# Patient Record
Sex: FEMALE | Race: WHITE
[De-identification: names, ages, dates, MRNs, and addresses within clinical notes are randomized per-mention and may not be internally consistent; named-entity substitution may affect disease eponyms.]

---

## 2017-01-19 ENCOUNTER — HOSPITAL ENCOUNTER (OUTPATIENT)
Dept: HOSPITAL 75 - PREOP | Age: 27
Discharge: HOME | End: 2017-01-19
Attending: OBSTETRICS & GYNECOLOGY
Payer: MEDICAID

## 2017-01-19 VITALS — WEIGHT: 196.13 LBS | BODY MASS INDEX: 33.48 KG/M2 | HEIGHT: 64 IN

## 2017-01-19 VITALS — SYSTOLIC BLOOD PRESSURE: 124 MMHG | DIASTOLIC BLOOD PRESSURE: 84 MMHG

## 2017-01-19 DIAGNOSIS — Z01.812: Primary | ICD-10-CM

## 2017-01-19 DIAGNOSIS — N80.9: ICD-10-CM

## 2017-01-19 LAB
BASOPHILS # BLD AUTO: 0 10^3/UL (ref 0–0.1)
BASOPHILS NFR BLD AUTO: 0 % (ref 0–10)
EOSINOPHIL # BLD AUTO: 0.1 10^3/UL (ref 0–0.3)
EOSINOPHIL NFR BLD AUTO: 2 % (ref 0–10)
ERYTHROCYTE [DISTWIDTH] IN BLOOD BY AUTOMATED COUNT: 13.1 % (ref 10–14.5)
LYMPHOCYTES # BLD AUTO: 2 X 10^3 (ref 1–4)
LYMPHOCYTES NFR BLD AUTO: 30 % (ref 12–44)
MCH RBC QN AUTO: 29 PG (ref 25–34)
MCHC RBC AUTO-ENTMCNC: 34 G/DL (ref 32–36)
MCV RBC AUTO: 84 FL (ref 80–99)
MONOCYTES # BLD AUTO: 0.4 X 10^3 (ref 0–1)
MONOCYTES NFR BLD AUTO: 6 % (ref 0–12)
NEUTROPHILS # BLD AUTO: 4.2 X 10^3 (ref 1.8–7.8)
NEUTROPHILS NFR BLD AUTO: 63 % (ref 42–75)
PLATELET # BLD: 340 10^3/UL (ref 130–400)
PMV BLD AUTO: 9.7 FL (ref 7.4–10.4)
RBC # BLD AUTO: 4.68 10^6/UL (ref 4.35–5.85)
WBC # BLD AUTO: 6.6 10^3/UL (ref 4.3–11)

## 2017-01-19 PROCEDURE — 87081 CULTURE SCREEN ONLY: CPT

## 2017-01-19 PROCEDURE — 85025 COMPLETE CBC W/AUTO DIFF WBC: CPT

## 2017-01-19 PROCEDURE — 36415 COLL VENOUS BLD VENIPUNCTURE: CPT

## 2017-01-19 PROCEDURE — 86901 BLOOD TYPING SEROLOGIC RH(D): CPT

## 2017-01-19 PROCEDURE — 86900 BLOOD TYPING SEROLOGIC ABO: CPT

## 2017-01-19 PROCEDURE — 86850 RBC ANTIBODY SCREEN: CPT

## 2017-01-26 ENCOUNTER — HOSPITAL ENCOUNTER (OUTPATIENT)
Dept: HOSPITAL 75 - SDC | Age: 27
LOS: 1 days | Discharge: HOME | End: 2017-01-27
Attending: OBSTETRICS & GYNECOLOGY
Payer: MEDICAID

## 2017-01-26 VITALS — DIASTOLIC BLOOD PRESSURE: 63 MMHG | SYSTOLIC BLOOD PRESSURE: 96 MMHG

## 2017-01-26 VITALS — DIASTOLIC BLOOD PRESSURE: 63 MMHG | SYSTOLIC BLOOD PRESSURE: 98 MMHG

## 2017-01-26 VITALS — SYSTOLIC BLOOD PRESSURE: 105 MMHG | DIASTOLIC BLOOD PRESSURE: 59 MMHG

## 2017-01-26 VITALS — DIASTOLIC BLOOD PRESSURE: 66 MMHG | SYSTOLIC BLOOD PRESSURE: 110 MMHG

## 2017-01-26 VITALS — DIASTOLIC BLOOD PRESSURE: 65 MMHG | SYSTOLIC BLOOD PRESSURE: 113 MMHG

## 2017-01-26 VITALS — BODY MASS INDEX: 33.48 KG/M2 | HEIGHT: 64 IN | WEIGHT: 196.13 LBS

## 2017-01-26 DIAGNOSIS — R33.9: ICD-10-CM

## 2017-01-26 DIAGNOSIS — Z87.42: ICD-10-CM

## 2017-01-26 DIAGNOSIS — R10.2: Primary | ICD-10-CM

## 2017-01-26 DIAGNOSIS — N94.5: ICD-10-CM

## 2017-01-26 DIAGNOSIS — N73.6: ICD-10-CM

## 2017-01-26 PROCEDURE — 96375 TX/PRO/DX INJ NEW DRUG ADDON: CPT

## 2017-01-26 PROCEDURE — 96376 TX/PRO/DX INJ SAME DRUG ADON: CPT

## 2017-01-26 PROCEDURE — 94664 DEMO&/EVAL PT USE INHALER: CPT

## 2017-01-26 PROCEDURE — 84703 CHORIONIC GONADOTROPIN ASSAY: CPT

## 2017-01-26 PROCEDURE — 96361 HYDRATE IV INFUSION ADD-ON: CPT

## 2017-01-26 RX ADMIN — MORPHINE SULFATE PRN MG: 10 INJECTION, SOLUTION INTRAMUSCULAR; INTRAVENOUS at 09:18

## 2017-01-26 RX ADMIN — KETOROLAC TROMETHAMINE PRN MG: 30 INJECTION, SOLUTION INTRAMUSCULAR; INTRAVENOUS at 09:17

## 2017-01-26 RX ADMIN — HYDROCODONE BITARTRATE AND ACETAMINOPHEN PRN EA: 7.5; 325 TABLET ORAL at 18:07

## 2017-01-26 RX ADMIN — SIMETHICONE PRN MG: 80 TABLET, CHEWABLE ORAL at 18:19

## 2017-01-26 RX ADMIN — SODIUM CHLORIDE, SODIUM LACTATE, POTASSIUM CHLORIDE, AND CALCIUM CHLORIDE PRN MLS/HR: 600; 310; 30; 20 INJECTION, SOLUTION INTRAVENOUS at 06:20

## 2017-01-26 RX ADMIN — KETOROLAC TROMETHAMINE PRN MG: 30 INJECTION, SOLUTION INTRAMUSCULAR; INTRAVENOUS at 20:17

## 2017-01-26 RX ADMIN — HYDROCODONE BITARTRATE AND ACETAMINOPHEN PRN EA: 7.5; 325 TABLET ORAL at 11:35

## 2017-01-26 RX ADMIN — KETOROLAC TROMETHAMINE PRN MG: 30 INJECTION, SOLUTION INTRAMUSCULAR; INTRAVENOUS at 15:12

## 2017-01-26 RX ADMIN — SODIUM CHLORIDE, SODIUM LACTATE, POTASSIUM CHLORIDE, AND CALCIUM CHLORIDE SCH MLS/HR: 600; 310; 30; 20 INJECTION, SOLUTION INTRAVENOUS at 22:15

## 2017-01-26 RX ADMIN — MORPHINE SULFATE PRN MG: 10 INJECTION, SOLUTION INTRAMUSCULAR; INTRAVENOUS at 09:06

## 2017-01-26 RX ADMIN — SODIUM CHLORIDE, SODIUM LACTATE, POTASSIUM CHLORIDE, AND CALCIUM CHLORIDE SCH MLS/HR: 600; 310; 30; 20 INJECTION, SOLUTION INTRAVENOUS at 13:56

## 2017-01-26 RX ADMIN — SODIUM CHLORIDE, SODIUM LACTATE, POTASSIUM CHLORIDE, AND CALCIUM CHLORIDE PRN MLS/HR: 600; 310; 30; 20 INJECTION, SOLUTION INTRAVENOUS at 08:25

## 2017-01-26 RX ADMIN — SODIUM CHLORIDE, SODIUM LACTATE, POTASSIUM CHLORIDE, AND CALCIUM CHLORIDE SCH MLS/HR: 600; 310; 30; 20 INJECTION, SOLUTION INTRAVENOUS at 12:03

## 2017-01-26 NOTE — PROGRESS NOTE-POST OPERATIVE
Post-Operative Progess Note


Assistant


Claribel Dickinson





Pre-Operative Diagnosis


CPP, Endometriosis, Dysmenorrhea, Menorrhagia





Post-Operative Diagnosis





same





Post-Op Procedure Note


Date of Procedure:  Jan 26, 2017


Name of Procedure:  


RATLH


Anesthesia Type


GETA


Estimated blood loss (mL):  50








CARLINE VILLA DO Jan 26, 2017 09:21

## 2017-01-26 NOTE — PROGRESS NOTE-PRE OPERATIVE
Pre-Operative Progress Note


H&P Reviewed


The H&P was reviewed, patient examined and no changes noted.


Date H&P Reviewed:  Jan 26, 2017


Time H&P Reviewed:  06:50


Pre-Operative Diagnosis:  CPP, Endometriosis, Dysmenorrhea, Menorrhagia








CARLINE VILLA DO Jan 26, 2017 6:49 am

## 2017-01-26 NOTE — DISCHARGE INST-WOMEN'S SERVICE
Discharge Inst-Women's Serv


Depart Medication/Instructions


New, Converted or Re-Newed RX:  RX on Chart





Consults/Follow Up


Additional Follow Up:  Yes





Activity


Activity:  Activity as Tolerated


Driving Instructions:  No Driving for 1 Week


NO SMOKING:  NO SMOKING


Nothing Inside Vagina:  No Douching, No Lyon, No Tampons





Diet


Discharge Diet:  No Restrictions


Symptoms to Report to :  Bleeding Excessive, Pain Increased, Fever Over 101 

Degrees F, Vaginal Bleeding Increase, Questions/Concerns


For Any Problems or Questions:  Contact Your Physician





Skin/Wound Care


Infection Signs and Symptoms:  Increased Redness, Foul Odor of Wound, Increased 

Drainage, Skin Itchy or Has a Rash, Increased Swelling, Temperature Above 101  F


Operative Area Clean and Dry:  Keep Incision Clean/Dry


Stitches/Staples/Dermabond:  Dermabond, Care of Stitches


Bathing Instructions:  CARLINE Cooper DO Jan 26, 2017 9:10 am

## 2017-01-27 VITALS — SYSTOLIC BLOOD PRESSURE: 103 MMHG | DIASTOLIC BLOOD PRESSURE: 63 MMHG

## 2017-01-27 VITALS — SYSTOLIC BLOOD PRESSURE: 103 MMHG | DIASTOLIC BLOOD PRESSURE: 64 MMHG

## 2017-01-27 RX ADMIN — SODIUM CHLORIDE, SODIUM LACTATE, POTASSIUM CHLORIDE, AND CALCIUM CHLORIDE SCH MLS/HR: 600; 310; 30; 20 INJECTION, SOLUTION INTRAVENOUS at 06:18

## 2017-01-27 RX ADMIN — HYDROCODONE BITARTRATE AND ACETAMINOPHEN PRN EA: 7.5; 325 TABLET ORAL at 00:45

## 2017-01-27 RX ADMIN — SIMETHICONE PRN MG: 80 TABLET, CHEWABLE ORAL at 04:40

## 2017-01-27 RX ADMIN — HYDROCODONE BITARTRATE AND ACETAMINOPHEN PRN EA: 7.5; 325 TABLET ORAL at 06:30

## 2017-01-27 RX ADMIN — IBUPROFEN PRN MG: 600 TABLET ORAL at 09:02

## 2017-01-27 RX ADMIN — IBUPROFEN PRN MG: 600 TABLET ORAL at 03:24

## 2017-01-27 RX ADMIN — SIMETHICONE PRN MG: 80 TABLET, CHEWABLE ORAL at 09:03

## 2017-01-27 NOTE — PROGRESS NOTE-STANDARD
Standard Progress Note


Progress Notes/Assess & Plan


Progress/Assessment & Plan


Patient kept overnight due to inability to urinate and pain control issues.  

Otherwise doing well, tolerating regular diet.








 Vital Sign - Last 12Hours








 1/26/17 1/27/17





 22:15 03:20


 


Temp 98.4 98.5


 


Pulse 80 70


 


Resp 18 18


 


B/P 105/59 103/63


 


Pulse Ox 100 98


 


O2 Delivery Room Air Room Air














 Intake and Output 


 


 1/27/17





 00:00


 


Intake Total 2822 ml


 


Output Total 450 ml


 


Balance 2372 ml








Incisions: c/d/i





Diagnosis:


POD 1 RATLH


Urinary retention





P:


Lara replaced last night, remove this morning


Pain med switched to percocet


DC later this morning with controlled pain and urination.








CARLINE VILLA DO Jan 27, 2017 7:26 am

## 2017-01-27 NOTE — ANESTHESIA-GENERAL POST-OP
General


Patient Condition


Mental Status/LOC:  Same as Preop


Cardiovascular:  Satisfactory


Nausea/Vomiting:  Absent


Respiratory:  Satisfactory


Pain:  Controlled


Complications:  Absent





Post Op Complications


Complications


None





Follow Up Care/Instructions


Patient Instructions


None needed.





Anesthesia/Patient Condition


Patient Condition


Patient is doing well, no complaints, stable vital signs, no apparent adverse 

anesthesia problems.   


No complications reported per nursing.








SHANNON LAUREN CRNA Jan 27, 2017 12:54

## 2017-01-27 NOTE — OPERATIVE REPORT
PROCEDURE PHYSICIAN:   MARSHAL VILLA

 

DATE OF PROCEDURE:  

2017

 

PREOPERATIVE DIAGNOSIS: 

1.   26-year-old female with chronic pelvic pain.

2.   Endometriosis. 

 

POSTOPERATIVE DIAGNOSES:

1.   26-year-old female with chronic pelvic pain. 

2.   Endometriosis. 

3.   Pelvic dense scar tissue. 

 

PROCEDURE:

Robotic assisted total laparoscopic hysterectomy with bilateral

salpingectomy. 

 

SURGEON:

Dr. Marshal Villa. 

 

ASSISTANT:

ABHIJEET Milton. 

 

ANESTHESIA:

General endotracheal. 

 

ESTIMATED BLOOD LOSS:

50 mL. 

 

URINE OUTPUT:

20 mL. 

 

FLUIDS:

1200 mL of lactated ringer solution. 

 

FINDINGS:

Hyperemic appearing uterus with multiple filmy and dense

adhesions of the anterior posterior pelvic peritoneum, dense

adhesions of the bladder to the anterior lower uterine segment,

grossly normal appearing bilateral ovaries.

 

SPECIMEN SENT:  

Uterus, bilateral fallopian tubes. 

 

INDICATIONS FOR THE PROCEDURE:

This 26-year-old female is a patient who is well establish in my

office and I have been taking care of her for the last 3 years.

She has undergone 2 pregnancies under my care; both of which were

complicated by chronic pelvic pain as well as necessitating

.  Prior to this last pregnancy the patient had tried

multiple contraceptive methods to help prevent recurrence of

endometriosis as she has an ongoing diagnosis of this.  We have

attempted Depo-Provera. We attempted Mirena IUD. We attempted

oral contraceptive pills all with no improvement in her pain. 

During this last pregnancy, we both had discussed extensively

proceeding with hysterectomy after the pregnancy was complete.

The risk of this was discussed with the patient throughout the

pregnancy.  At her postpartum visit we once again revisited this

and the patient was very agreeable to move forward due to failed

conservative measures. The risk of the procedure was discussed

with the patient in detail including risk of bleeding, infection,

damaging any of the surrounding structures, including, but not

limited to the bowel, bladder, ureter, damage to the anterior

abdominal wall, possible risk of bleeding and need for blood

transfusion, risk from anesthesia, hematoma formation, risk for

subsequent procedures any complications should occur, even death.

After everything was discussed with the patient, consent was

obtained in the preoperative area and the patient was taken to

the operating room. 

 

OPERATIVE REPORT IN DETAIL:  

Once in the operating room, general anesthesia was found to be

adequate.  She was placed in dorsal lithotomy position, prepped

and draped in the normal sterile fashion. Lara catheter was

placed using sterile technique. A weighted speculum was inserted

in the patient's vagina. A right angle retractor was used to

visualize the cervix. It was grasped at the 12 o'clock position

using a single tooth tenaculum. An 0 Vicryl suture was placed

through the anterior lip of the cervix and the uterine cavity

depth is sounded and found to be 8 cm.  I then gently dilate the

cervix using Hegar dilators and maximum dilatation of 5 mm.  I

then advanced an 8 mm uterine manipulator balloon on the end of

the RADHA and deploy the balloon.  I advance a 3.5 cm colpotomy

ring into the vaginal fornix, which offers excellent uterine

manipulation on bimanual examination. I then perform a change of

gloves and take my attention the abdomen where supraumbilically I

infiltrate this area using 0.25% Marcaine and make an 8 mm

incision and direct a Veress needle through this incision until

intraperitoneal placement is confirmed using the saline drop

test. An opening pressure 2 mmHg is noted. I proceed to maximum

pressure of 15 mmHg using CO2 gas as my insufflation medium. I

then remove the Veress needle, introduce an 8 mm blunt da Aaron

camera trocar through this incision until intraperitoneal

placement is confirmed using the da Aaron laparoscope. I then

have the patient placed in steep Trendelenburg and I am able to

visualize all the anatomy described in my findings above. I then

place lateral trocars; these are both 8 mm trocars. They are

placed under direct visualization of the laparoscope. Incisions

are made using a knife and the skin is infiltrated using 0.25%

Marcaine. Once the trocars are in place, I bring the da Aaron

robot and dock it in the appropriate fashion. I perform the

following of the procedure using the following instruments; I

place the da Aaron vessel sealer in the left hand and monopolar

sailaja in the right hand.  The following dissection is performed

bilaterally. I start at the utero-ovarian ligament, bipolar

cauterize this and transect it using the vessel sealer. I then

create a window through the mesosalpinx using the monopolar

sailaja and take this distally, amputating the fallopian tube from

the surrounding structures. I then grasp the round ligament,

bipolar cauterize this and transect it using the vessel sealer. I

then grasp the entire broad ligament, which is densely scarred to

the anterior posterior peritoneum down to the level of the lower

uterine segment. There are dense adhesions so I have to take this

dissection down with care.  There is also dense adhesions to the

anterior vaginal fornix in the lower uterine segment. I very

carefully take down the bladder flap over the lower uterine

segment which exposes the anterior vaginal fornix. I then enter

the anterior vaginal fornix using the monopolar sailaja which I am

able to visualize the anterior cuff of the RADHA uterine

manipulator at that point. I then take the posterior leaflet of

the broad ligament down to the posterior vaginal fornix which

allows me to skeletonize the uterine vessels bilaterally. Once

they are skeletonized, I bipolar cauterize them and transect them

using the vessel sealer. This allows me a circumferential view of

the vaginal fornix at which point I use a monopolar shear to

amputate the cervix away from the vaginal fornix. The uterus,

cervix and bilateral fallopian tubes are then removed through the

vagina. I then close the vaginal cuff using 2-0 Vicryl suture in

a figure-of-eight fashion. The lateral vaginal apices,

colposuspending them to the uterosacral ligaments. I then close

the remainder of the cuff using 2-0 V-Loc in a running fashion.

There is no active bleeding noted from any my dissection planes

after this is completed. I then undock the da Aaron robot and

copiously irrigate the pelvis using normal saline and traditional

laparoscopic technique. I then place FloSeal hemostatic agent

over all my planes of dissection. I  place in steep Trendelenburg

and remove the lateral trocars under visualization of the

laparoscope. The infraumbilical trocar is used to release

insufflation.  I introduced 10 mL of 0.25% Marcaine into the

peritoneal cavity for postoperative pain management. I then

remove the supraumbilical trocar and closed the incisions using

4-0 Monocryl in an interrupted subcuticular stitches stitch.

Dermabond is applied to the incisions and Band-Aids are placed

over these. Lara catheter was left in place. The patient

tolerated the procedure well and was taken to the recovery area

in stable condition. Lap and sponge counts were correct at the

end of the procedure. Instrument count is correct as well. 2

grams of Ancef and 500 mg of Flagyl given preoperatively for

infection prophylaxis 

 

 

Job ID: [id number]

Dictated Date: 2017 09:28:45 

Transcription Date: 2017 08:18:39 / nicole

## 2017-01-28 ENCOUNTER — HOSPITAL ENCOUNTER (OUTPATIENT)
Dept: HOSPITAL 75 - ER | Age: 27
Setting detail: OBSERVATION
LOS: 2 days | Discharge: HOME | End: 2017-01-30
Attending: INTERNAL MEDICINE | Admitting: INTERNAL MEDICINE
Payer: MEDICAID

## 2017-01-28 VITALS — BODY MASS INDEX: 31.24 KG/M2 | HEIGHT: 64 IN | WEIGHT: 183 LBS

## 2017-01-28 VITALS — SYSTOLIC BLOOD PRESSURE: 120 MMHG | DIASTOLIC BLOOD PRESSURE: 75 MMHG

## 2017-01-28 VITALS — DIASTOLIC BLOOD PRESSURE: 75 MMHG | SYSTOLIC BLOOD PRESSURE: 119 MMHG

## 2017-01-28 VITALS — DIASTOLIC BLOOD PRESSURE: 68 MMHG | SYSTOLIC BLOOD PRESSURE: 131 MMHG

## 2017-01-28 DIAGNOSIS — F17.210: ICD-10-CM

## 2017-01-28 DIAGNOSIS — L03.211: ICD-10-CM

## 2017-01-28 DIAGNOSIS — K04.7: Primary | ICD-10-CM

## 2017-01-28 DIAGNOSIS — R79.89: ICD-10-CM

## 2017-01-28 DIAGNOSIS — K59.09: ICD-10-CM

## 2017-01-28 LAB
ALBUMIN SERPL-MCNC: 3.5 G/DL (ref 3.2–4.5)
ALT SERPL-CCNC: 76 U/L (ref 0–55)
ANION GAP SERPL CALC-SCNC: 8 MMOL/L (ref 5–14)
AST SERPL-CCNC: 44 U/L (ref 5–34)
BASOPHILS # BLD AUTO: 0 10^3/UL (ref 0–0.1)
BASOPHILS NFR BLD AUTO: 0 % (ref 0–10)
BILIRUB SERPL-MCNC: 0.4 MG/DL (ref 0.1–1)
BUN SERPL-MCNC: 5 MG/DL (ref 7–18)
BUN/CREAT SERPL: 7
CALCIUM SERPL-MCNC: 8.4 MG/DL (ref 8.5–10.1)
CHLORIDE SERPL-SCNC: 106 MMOL/L (ref 98–107)
CO2 SERPL-SCNC: 24 MMOL/L (ref 21–32)
CREAT SERPL-MCNC: 0.75 MG/DL (ref 0.6–1.3)
EOSINOPHIL # BLD AUTO: 0.2 10^3/UL (ref 0–0.3)
EOSINOPHIL NFR BLD AUTO: 3 % (ref 0–10)
ERYTHROCYTE [DISTWIDTH] IN BLOOD BY AUTOMATED COUNT: 13.3 % (ref 10–14.5)
GFR SERPLBLD BASED ON 1.73 SQ M-ARVRAT: > 60 ML/MIN
GLUCOSE SERPL-MCNC: 84 MG/DL (ref 70–105)
LYMPHOCYTES # BLD AUTO: 1.8 X 10^3 (ref 1–4)
LYMPHOCYTES NFR BLD AUTO: 21 % (ref 12–44)
MCH RBC QN AUTO: 29 PG (ref 25–34)
MCHC RBC AUTO-ENTMCNC: 33 G/DL (ref 32–36)
MCV RBC AUTO: 87 FL (ref 80–99)
MONOCYTES # BLD AUTO: 0.6 X 10^3 (ref 0–1)
MONOCYTES NFR BLD AUTO: 7 % (ref 0–12)
NEUTROPHILS # BLD AUTO: 6 X 10^3 (ref 1.8–7.8)
NEUTROPHILS NFR BLD AUTO: 70 % (ref 42–75)
PLATELET # BLD: 270 10^3/UL (ref 130–400)
PMV BLD AUTO: 9.1 FL (ref 7.4–10.4)
POTASSIUM SERPL-SCNC: 4 MMOL/L (ref 3.6–5)
PROT SERPL-MCNC: 6.4 G/DL (ref 6.4–8.2)
RBC # BLD AUTO: 4.23 10^6/UL (ref 4.35–5.85)
SODIUM SERPL-SCNC: 138 MMOL/L (ref 135–145)
WBC # BLD AUTO: 8.6 10^3/UL (ref 4.3–11)

## 2017-01-28 PROCEDURE — 80074 ACUTE HEPATITIS PANEL: CPT

## 2017-01-28 PROCEDURE — 70487 CT MAXILLOFACIAL W/DYE: CPT

## 2017-01-28 PROCEDURE — 80053 COMPREHEN METABOLIC PANEL: CPT

## 2017-01-28 PROCEDURE — 96365 THER/PROPH/DIAG IV INF INIT: CPT

## 2017-01-28 PROCEDURE — 36415 COLL VENOUS BLD VENIPUNCTURE: CPT

## 2017-01-28 PROCEDURE — 85025 COMPLETE CBC W/AUTO DIFF WBC: CPT

## 2017-01-28 RX ADMIN — SODIUM CHLORIDE ONE MG: 900 INJECTION INTRAVENOUS at 13:08

## 2017-01-28 RX ADMIN — OXYCODONE HYDROCHLORIDE AND ACETAMINOPHEN PRN TAB: 10; 325 TABLET ORAL at 20:20

## 2017-01-28 RX ADMIN — OXYCODONE HYDROCHLORIDE AND ACETAMINOPHEN PRN TAB: 10; 325 TABLET ORAL at 15:03

## 2017-01-28 RX ADMIN — CEFAZOLIN SCH MLS/HR: 10 INJECTION, POWDER, FOR SOLUTION INTRAVENOUS at 20:19

## 2017-01-28 RX ADMIN — SODIUM CHLORIDE ONE MG: 900 INJECTION INTRAVENOUS at 13:12

## 2017-01-28 NOTE — DIAGNOSTIC IMAGING REPORT
PROCEDURE: CT maxillofacial with contrast.



TECHNIQUE: After intravenous administration of contrast, axial

images were obtained through the face and reformatted into

coronal and sagittal planes.



INDICATION:  Face pain.



COMPARISON: None.



FINDINGS: There is moderate inflammation involving the right

cheek within the subcutaneous tissues. There is a low-density

lesion which abuts the surface of the right lateral maxilla with

adjacent apical loosening. There is some slight rim enhancement.

The lesion measures approximately 10 mm and likely represents a

phlegmon or evolving abscess. This does not contain internal

gas. Considerable dental disease is seen involving the

mandibular and maxillary molars more so on the right. Mucous

retention cyst is seen in the maxillary sinuses. No air-fluid

levels are seen. There is no fracture or overt evidence of

osteomyelitis.



IMPRESSION:

1. Considerable inflammation involving the soft tissues

involving the right cheek adjacent to a significant periapical

lucency of the mandibular and maxillary molars, likely

cellulitis.

2.  Low-density collection adjacent to the right maxilla

probably representing an evolving abscess and/or phlegmon.

3. Advanced dental disease.



Dictated by:



Dictated on workstation # [id number]

## 2017-01-28 NOTE — ED EENT
History of Present Illness


General


Chief Complaint:  Facial Problems


Stated Complaint:  POST OP/R SIDE FACIAL SWELLING


Nursing Triage Note:  


PT HAS R SIDED FACIAL SWELLING SINCE YESTERDAY.


Source:  patient





History of Present Illness


Time seen by provider:  10:42


Initial Comments


The patient is a 26-year-old white female who had a vaginal hysterectomy 

performed here on .  She reports that she was warned that she might have 

some facial swelling postoperatively as a function of the surgery.  She noted 

swelling and pointed out to the nurses on her discharge yesterday.  There 

continues to be increasing swelling and pain in the right face from the brow to 

the jaw.  She has noted a missing tooth that she was not aware of previously 

either


Location:  eye (R), facial


Prearrival Treatment:  no prearrival treatment





Allergies and Home Medications


Allergies


Coded Allergies:  


     No Known Drug Allergies (Unverified , 17)





Home Medications


Butalb/Acetaminophen/Caffeine 1 Each Capsule 1 EACH PO Q6H PRN PRN MIGRAINE (

Reported) 


Docusate Sodium 100 Mg Capsule #40 100 MG PO BID PRN PRN CONSTIPATION 


   Prescribed by: CARLINE VILLA on 17


Fluoxetine HCl 10 Mg Capsule 10 MG PO DAILY (Reported) 


Hydrocodone/Acetaminophen 1 Each Tablet #50 1-2 EA PO Q6H PRN PRN PAIN 


   Prescribed by: CARLINE VILLA on 17


Ibuprofen 600 Mg Tablet #80 600 MG PO Q6H PRN PRN PAIN 


   Prescribed by: CARLINE VILLA on 17


Oxycodone HCl/Acetaminophen 1 Each Tablet #50 2 TAB PO Q4H PRN PRN MODERATE 

PAIN 


   Prescribed by: CARLINE VILLA on 17


Simethicone 80 Mg Tab.chew #40 40 MG PO TID PRN PRN INDIGESTION 


   Prescribed by: CARLINE VILLA on 17





Review of Systems


Constitutional:   see HPI


Eyes:   Other (swelling and tearing)


Ears:   No Symptoms Reported


Nose:   no symptoms reported


Mouth:   loose teeth pain


Throat:   no symptoms reported


Respiratory:   no symptoms reported


Cardiovascular:   no symptoms reported





Past Medical-Social-Family Hx


Patient Social History


Alcohol Use:  Denies Use


Recreational Drug Use:  No


Smoking Status:  Current Everyday Smoker


Type Used:  Cigarettes


Former Smoker/When Quit:  Dec 1, 2013


Recent Foreign Travel:  No


Contact w/Someone Who Travel:  No


Recent Infectious Disease Expo:  No


Recent Hopitalizations:  Yes (PARTIAL HYST)


Physical Abuse Screen:  No


Sexual Abuse:  No





Immunizations Up To Date


Tetanus Booster (TDap):  More than 5yrs


PED Vaccines UTD:  Yes





Seasonal Allergies


Seasonal Allergies:  No





Surgeries


HX Surgeries:  Yes (CS X4)


Surgeries:   Section, Hysterectomy





Respiratory


Hx Respiratory Disorders:  Yes (AS A CHILD)


Respiratory Disorders:  Asthma





Cardiovascular


Hx Cardiac Disorders:  No





Neurological


Hx Neurological Disorders:  Yes ("SEIZURE ACTIVITY"-PREGNANCY RELATED)


Neurological Disorders:  Headaches /Migraines, TIA





Reproductive System


Hx Reproductive Disorders:  Yes (CPP, DYSMENORRHIA)


Sexually Transmitted Disease:  No


HIV/AIDS:  No


Female Reproductive Disorders:  Menstrual Problems, Endometriosis, Ovarian Cyst





Genitourinary


Hx Genitourinary Disorders:  No


Genitourinary Disorders:  Kidney Infection, Bladder Infection





Gastrointestinal


Hx Gastrointestinal Disorders:  No





Musculoskeletal


Hx Musculoskeletal Disorders:  No





Endocrine


Hx Endocrine Disorders:  No





HEENT


HX ENT Disorders:  Yes (GLASSES)


Loss of Vision:  Bilateral


Hearing Impairment:  Denies





Cancer


Hx Cancer:  No





Psychosocial


Hx Psychiatric Problems:  Yes


Behavioral Health Disorders:  Anxiety, Depression





Integumentary


HX Skin/Integumentary Disorder:  No


Skin/Integumentary Disorders:  Recent Skin Changes





Blood Transfusions


Hx Blood Disorders:  No


Adverse Reaction to a Blood Tr:  No





Family Medical History


Significant Family History:  No Pertinent Family Hx


Family Medial History:  


Diabetes mellitus


Family history: Hypertension


  19 MOTHER


Hereditary disease


  daughter (Ruy Simons's syndrome)


Stroke


  19 FATHER





Physical Exam


Vital Signs





 Vital Sign - Last 12Hours








 17





 10:23


 


Temp 98.9


 


Pulse 75


 


Resp 16


 


B/P 127/82


 


Pulse Ox 98


 


O2 Delivery Room Air








General Appearance:   mild distress


Eyes:  bilateral eye normal inspection


Nose:   normal inspection


Mouth/Throat:   dental tenderness


Neck:   non-tender full range of motion supple normal inspection


Cardiovascular:   normal peripheral pulses regular rate, rhythm no edema no 

gallop no JVD no murmur


Gastrointestinal:   normal bowel sounds non tender soft no organomegaly no 

pulsatile mass tenderness spleenomegaly





Progress/Results/Core Measures


Results/Orders


Lab Results





 Laboratory Tests








Test


  17


10:45 Range/Units


 


 


Alanine Aminotransferase


(ALT/SGPT) 76 H


  0-55  U/L


 


 


Albumin 3.5  3.2-4.5  G/DL


 


Alkaline Phosphatase 67    U/L


 


Anion Gap 8  5-14  MMOL/L


 


Aspartate Amino Transf


(AST/SGOT) 44 H


  5-34  U/L


 


 


BUN/Creatinine Ratio 7   


 


Basophils # (Auto)


  0.0 


  0.0-0.1


10^3/uL


 


Basophils (%) (Auto) 0  0-10  %


 


Blood Urea Nitrogen 5 L 7-18  MG/DL


 


Calcium Level 8.4 L 8.5-10.1  MG/DL


 


Carbon Dioxide Level 24  21-32  MMOL/L


 


Chloride Level 106    MMOL/L


 


Creatinine


  0.75 


  0.60-1.30


MG/DL


 


Eosinophils # (Auto)


  0.2 


  0.0-0.3


10^3/uL


 


Eosinophils (%) (Auto) 3  0-10  %


 


Estimat Glomerular Filtration


Rate > 60 


   


 


 


Glucose Level 84    MG/DL


 


Hematocrit 37  35-52  %


 


Hemoglobin 12.1  11.5-16.0  G/DL


 


Lymphocytes # (Auto) 1.8  1.0-4.0  X 10^3


 


Lymphocytes (%) (Auto) 21  12-44  %


 


Mean Corpuscular Hemoglobin 29  25-34  PG


 


Mean Corpuscular Hemoglobin


Concent 33 


  32-36  G/DL


 


 


Mean Corpuscular Volume 87  80-99  FL


 


Mean Platelet Volume 9.1  7.4-10.4  FL


 


Monocytes # (Auto) 0.6  0.0-1.0  X 10^3


 


Monocytes (%) (Auto) 7  0-12  %


 


Neutrophils # (Auto) 6.0  1.8-7.8  X 10^3


 


Neutrophils (%) (Auto) 70  42-75  %


 


Platelet Count


  270 


  130-400


10^3/uL


 


Potassium Level 4.0  3.6-5.0  MMOL/L


 


Red Blood Count


  4.23 L


  4.35-5.85


10^6/uL


 


Red Cell Distribution Width 13.3  10.0-14.5  %


 


Sodium Level 138  135-145  MMOL/L


 


Total Bilirubin 0.4  0.1-1.0  MG/DL


 


Total Protein 6.4  6.4-8.2  G/DL


 


White Blood Count


  8.6 


  4.3-11.0


10^3/uL








My Orders





 Orders-CHRISTIANA GONGORA MD


Cbc With Automated Diff (17 10:36)


Comprehensive Metabolic Panel (17 10:36)


Iohexol Injection (Omnipaque 350 Mg/Ml 1 (17 11:00)


Ns (Ivpb) (Sodium Chloride 0.9% Ivpb Bag (17 11:00)


Saline Lock/Iv-Start (17 10:59)





Medications Given in ED





 Current Medications








 Medications  Dose


 Ordered  Sig/Nighat


 Route  Start Time


 Stop Time Status Last Admin


Dose Admin


 


 Iohexol  75 ml  ONCE  ONCE


 IV  17 11:00


 17 11:01 DC 17 11:07


75 ML


 


 Sodium Chloride  100 ml  ONCE  ONCE


 IV  17 11:00


 17 11:01 DC 17 11:07


80 ML








Vital Signs/I&O





 Vital Sign - Last 12Hours








 17





 10:23


 


Temp 98.9


 


Pulse 75


 


Resp 16


 


B/P 127/82


 


Pulse Ox 98


 


O2 Delivery Room Air








Blood Pressure Mean:  97





Departure


Communication


Progress Notes


Tooth marked number 1 reveals a scummy crater.  Tooth number 2 is fractured.  

There is redness warmth and swelling of the right face from the upper border of 

the brow to the mandible.  The mandibular teeth are in reasonable repair.  The 

maxillary teeth are missing or in poor repair





1211 the patient reports that she has a disabled daughter, a 2-year-old daughter

, and a 2-month-old.  She was informed that the CT scan showed diffuse 

cellulitis and early abscess formation and that she needed to stay for IV 

antibiotics.  She had difficulty processing this and reports that today she had 

to be home I couldn't she take oral antibiotics.  She was informed that it 

would be unlikely to achieve a high enough blood level to be useful in the 

treatment and the progression of an abscess could be a disaster.  The 

supervisor was called and offered to place to Cribs for her children in her 

room if she could supervise. She continues to express distress over this 

prospect.  Her friends who are at her bedside are attempting to  her to 

stay





1239.  She is calm are now has arranged for supervision of the eldest child 

with the 2 younger being in her room





Impression


Impression:  


 Primary Impression:  


 Cellulitis of face


 Additional Impression:  


 dental abscess


Disposition:   ADMITTED AS INPATIENT


Condition:  Stable/Unchanged


Decision to Admit Reason:  Admit from ER (General)


Decision to Admit/Date:  2017


Time/Decision to Admit Time:  12:41





Departure-Patient Inst.


Referrals:  


EDELMIRA CHENG MD (PCP)


Primary Care Physician





Images


Mouth/Nose











1 - 


2 - Fracture Tooth











CHRISTIANA GONGORA MD 2017 10:48

## 2017-01-29 VITALS — SYSTOLIC BLOOD PRESSURE: 107 MMHG | DIASTOLIC BLOOD PRESSURE: 74 MMHG

## 2017-01-29 VITALS — DIASTOLIC BLOOD PRESSURE: 78 MMHG | SYSTOLIC BLOOD PRESSURE: 133 MMHG

## 2017-01-29 VITALS — SYSTOLIC BLOOD PRESSURE: 118 MMHG | DIASTOLIC BLOOD PRESSURE: 81 MMHG

## 2017-01-29 VITALS — DIASTOLIC BLOOD PRESSURE: 57 MMHG | SYSTOLIC BLOOD PRESSURE: 99 MMHG

## 2017-01-29 VITALS — SYSTOLIC BLOOD PRESSURE: 118 MMHG | DIASTOLIC BLOOD PRESSURE: 65 MMHG

## 2017-01-29 VITALS — SYSTOLIC BLOOD PRESSURE: 125 MMHG | DIASTOLIC BLOOD PRESSURE: 67 MMHG

## 2017-01-29 VITALS — SYSTOLIC BLOOD PRESSURE: 135 MMHG | DIASTOLIC BLOOD PRESSURE: 78 MMHG

## 2017-01-29 RX ADMIN — CEFAZOLIN SCH MLS/HR: 10 INJECTION, POWDER, FOR SOLUTION INTRAVENOUS at 04:32

## 2017-01-29 RX ADMIN — OXYCODONE HYDROCHLORIDE AND ACETAMINOPHEN PRN TAB: 10; 325 TABLET ORAL at 00:47

## 2017-01-29 RX ADMIN — OXYCODONE HYDROCHLORIDE AND ACETAMINOPHEN PRN TAB: 10; 325 TABLET ORAL at 20:21

## 2017-01-29 RX ADMIN — LACTULOSE SCH GM: 20 SOLUTION ORAL at 22:02

## 2017-01-29 RX ADMIN — SIMETHICONE PRN MG: 80 TABLET, CHEWABLE ORAL at 11:25

## 2017-01-29 RX ADMIN — CEFAZOLIN SCH MLS/HR: 10 INJECTION, POWDER, FOR SOLUTION INTRAVENOUS at 11:35

## 2017-01-29 RX ADMIN — OXYCODONE HYDROCHLORIDE AND ACETAMINOPHEN PRN TAB: 10; 325 TABLET ORAL at 13:00

## 2017-01-29 RX ADMIN — LACTULOSE SCH GM: 20 SOLUTION ORAL at 17:10

## 2017-01-29 RX ADMIN — OXYCODONE HYDROCHLORIDE AND ACETAMINOPHEN PRN TAB: 10; 325 TABLET ORAL at 04:31

## 2017-01-29 RX ADMIN — OXYCODONE HYDROCHLORIDE AND ACETAMINOPHEN PRN TAB: 10; 325 TABLET ORAL at 08:46

## 2017-01-29 RX ADMIN — DOCUSATE SODIUM PRN MG: 100 CAPSULE ORAL at 11:24

## 2017-01-29 RX ADMIN — IBUPROFEN PRN MG: 600 TABLET ORAL at 11:24

## 2017-01-29 RX ADMIN — IBUPROFEN PRN MG: 600 TABLET ORAL at 17:11

## 2017-01-29 RX ADMIN — SIMETHICONE PRN MG: 80 TABLET, CHEWABLE ORAL at 11:22

## 2017-01-29 RX ADMIN — CEFAZOLIN SCH MLS/HR: 10 INJECTION, POWDER, FOR SOLUTION INTRAVENOUS at 20:11

## 2017-01-29 NOTE — HISTORY & PHYSICAL-HOSPITALIST
HPI


History of Present Illness:


HPI/Chief Complaint


CC: Right tooth abscess





HPI: This is a patient of Dr Pinon' w/h/o Hyst by Dr Green 3 days ago that 

presents to the ER with right-sided facial swelling.  She just had surgery by 

Dr. GREEN and was having some difficulties with a molar intending to have all 

of her teeth removed once her tax return refund was processed but reported the 

pain and swelling that had increased even though she was placed on antibiotics 

orally.  She reluctantly agreed to staying in the hospital for IV antibiotics 

because she has a 2-month-old child and a 2-year-old along with 2 other 

children.  She is overall much improved since receiving IV antibiotics and pain 

medication but is still having some constipation from the hysterectomy.  

Overall the plan will be  consult and hopefully discharge to Dr. Ramírez's office tomorrow to drain abscess and ultimately continue oral 

antibiotics and close follow-up for dental extraction is planned.


Source:  patient


Exam Limitations:  no limitations


Date Seen


17


Attending Physician


Leigh Ann James Rachel L MD


Referring Physician





Date of Admission


2017 at 12:49





Home Medications & Allergies


Home Medications


Reviewed patient Home Medication Reconciliation Form





Allergies


Coded Allergies:  


     No Known Drug Allergies (Unverified , 17)





Past Medical-Social-Family Hx


Patient Social History


Alcohol Use:  Denies Use


Recreational Drug Use:  No


Smoking Status:  Current Everyday Smoker


Former smoker/When Quit:  Dec 1, 2013


Type Used:  Cigarettes


Physical Abuse Screen:  No


Sexual Abuse:  No


Recent Foreign Travel:  No


Contact w/other who traveled:  No


Recent Hopitalizations:  Yes (PARTIAL HYST)


Recent Infectious Disease Expo:  No





Immunizations Up To Date


Tetanus Booster (TDap):  More than 5yrs





Seasonal Allergies


Seasonal Allergies:  No





Surgeries


HX Surgeries:  Yes (CS X4)


Surgeries:   Section, Hysterectomy (17 Dr Green)





Respiratory


Hx Respiratory Disorders:  Yes (AS A CHILD)





Cardiovascular


Hx Cardiovascular Disorders:  No





Neurological


Hx Neurological Disorders:  Yes ("SEIZURE ACTIVITY"-PREGNANCY RELATED)


Neurological Disorders:  Headaches /Migraines, TIA





Reproductive System


Hx Reproductive Disorders:  Yes (CPP, DYSMENORRHIA)


Sexually Transmitted Disease:  No


HIV/AIDS:  No


Female Reproductive Disorders:  Menstrual Problems, Endometriosis, Ovarian Cyst





Genitourinary


Hx Genitourinary Disorders:  No


Genitourinary Disorders:  Kidney Infection, Bladder Infection





Gastrointestinal


Hx Gastrointestinal Disorders:  No





Musculoskeletal


Hx Musculoskeletal Disorders:  No





Endocrine


Hx Endocrine Disorders:  No





HEENT


HX ENT Disorders:  Yes (GLASSES)


Loss of Vision:  Bilateral


Hearing Impairment:  Denies





Cancer


Hx Cancer:  No





Psychosocial


Hx Psychiatric Problems:  Yes


Behavioral Health Disorders:  Anxiety, Depression





Integumentary


HX Skin/Integumentary Disorder:  No


Skin/Integumentary Disorders:  Recent Skin Changes





Blood Transfusions


Hx Blood Disorders:  No


Adverse Reaction to a Blood Tr:  No





Family Medical History


Significant Family History:  No Pertinent Family Hx


Family Hx:  


Diabetes mellitus


Family history: Hypertension


  19 MOTHER


Hereditary disease


  daughter (Ruy Simons's syndrome)


Stroke


  19 FATHER





Review of Systems


Constitutional:   see HPI weakness


EENTM:   mouth pain


Respiratory:   no symptoms reported


Cardiovascular:   no symptoms reported


Gastrointestinal:   constipation


Genitourinary:   no symptoms reported


Musculoskeletal:   no symptoms reported


Skin:   no symptoms reported


Psychiatric/Neurological:   No Symptoms Reported


All Other Systems Reviewed


Negative Unless Noted:  Yes





Physical Exam


Physical Exam


Vital Signs





 Vital Sign - Last 12Hours








 17





 10:23


 


Temp 98.9


 


Pulse 75


 


Resp 16


 


B/P 127/82


 


Pulse Ox 98


 


O2 Delivery Room Air





Capillary Refill : Less Than 3 Seconds


General Appearance:   No Apparent Distress WD/WN


Eyes:  Bilateral Eye Normal Inspection, Bilateral Eye PERRL


HEENT:   PERRL/EOMI Normal ENT Inspection Pharynx Normal Other (right facial 

swelling and erythema much improved)


Neck:   Full Range of Motion Normal Inspection Non Tender Supple Carotid Bruit


Respiratory:   Chest Non Tender Lungs Clear Normal Breath Sounds No Accessory 

Muscle Use No Respiratory Distress


Cardiovascular:   Regular Rate, Rhythm No Edema No Gallop No JVD No Murmur 

Normal Peripheral Pulses


Gastrointestinal:   Normal Bowel Sounds No Organomegaly No Pulsatile Mass Non 

Tender Soft


Back:   Normal Inspection No CVA Tenderness No Vertebral Tenderness


Extremity:   Normal Capillary Refill Normal Inspection Normal Range of Motion 

Non Tender No Calf Tenderness No Pedal Edema


Neurologic/Psychiatric:   Alert Oriented x3 No Motor/Sensory Deficits Normal 

Mood/Affect


Skin:   Normal Color Warm/Dry


Lymphatic:   No Adenopathy





Results


Results/Procedures


Lab


Laboratory Tests


17 10:45














Assessment/Plan


Admission Diagnosis


Assessment:


Severe dental abscess with facial swelling and erythema requiring IV 

antibiotics due to failure of by mouth antibiotics


Recent hysterectomy by Dr. GREEN


Postop constipation





Assessment and Plan


Continue IV antibiotics and fentanyl and Percocet for pain


Lactulose and stool softeners and home medication


Hopefully oral surgery can see her tomorrow and remove abscess and placed back 

on oral antibiotics and proceeding on with dental extraction is planned





Clinical Quality Measures


DVT/VTE Risk/Contraindication:


Risk Factor Score Per Nursing:  3


RFS Level Per Nursing on Admit:  3=High








LEIGH ANN JAMES DO 2017 12:42

## 2017-01-30 VITALS — DIASTOLIC BLOOD PRESSURE: 85 MMHG | SYSTOLIC BLOOD PRESSURE: 142 MMHG

## 2017-01-30 LAB
ALBUMIN SERPL-MCNC: 3.2 G/DL (ref 3.2–4.5)
ALT SERPL-CCNC: 61 U/L (ref 0–55)
ANION GAP SERPL CALC-SCNC: 7 MMOL/L (ref 5–14)
AST SERPL-CCNC: 45 U/L (ref 5–34)
BASOPHILS # BLD AUTO: 0 10^3/UL (ref 0–0.1)
BASOPHILS NFR BLD AUTO: 0 % (ref 0–10)
BILIRUB SERPL-MCNC: 0.2 MG/DL (ref 0.1–1)
BUN SERPL-MCNC: 5 MG/DL (ref 7–18)
BUN/CREAT SERPL: 7
CALCIUM SERPL-MCNC: 8.6 MG/DL (ref 8.5–10.1)
CHLORIDE SERPL-SCNC: 105 MMOL/L (ref 98–107)
CO2 SERPL-SCNC: 26 MMOL/L (ref 21–32)
CREAT SERPL-MCNC: 0.7 MG/DL (ref 0.6–1.3)
EOSINOPHIL # BLD AUTO: 0.2 10^3/UL (ref 0–0.3)
EOSINOPHIL NFR BLD AUTO: 3 % (ref 0–10)
ERYTHROCYTE [DISTWIDTH] IN BLOOD BY AUTOMATED COUNT: 13.1 % (ref 10–14.5)
GFR SERPLBLD BASED ON 1.73 SQ M-ARVRAT: > 60 ML/MIN
GLUCOSE SERPL-MCNC: 83 MG/DL (ref 70–105)
LYMPHOCYTES # BLD AUTO: 1.9 X 10^3 (ref 1–4)
LYMPHOCYTES NFR BLD AUTO: 29 % (ref 12–44)
MCH RBC QN AUTO: 29 PG (ref 25–34)
MCHC RBC AUTO-ENTMCNC: 33 G/DL (ref 32–36)
MCV RBC AUTO: 87 FL (ref 80–99)
MONOCYTES # BLD AUTO: 0.4 X 10^3 (ref 0–1)
MONOCYTES NFR BLD AUTO: 6 % (ref 0–12)
NEUTROPHILS # BLD AUTO: 4.1 X 10^3 (ref 1.8–7.8)
NEUTROPHILS NFR BLD AUTO: 62 % (ref 42–75)
PLATELET # BLD: 279 10^3/UL (ref 130–400)
PMV BLD AUTO: 9.8 FL (ref 7.4–10.4)
POTASSIUM SERPL-SCNC: 4.3 MMOL/L (ref 3.6–5)
PROT SERPL-MCNC: 6.2 G/DL (ref 6.4–8.2)
RBC # BLD AUTO: 3.99 10^6/UL (ref 4.35–5.85)
SODIUM SERPL-SCNC: 138 MMOL/L (ref 135–145)
WBC # BLD AUTO: 6.6 10^3/UL (ref 4.3–11)

## 2017-01-30 RX ADMIN — OXYCODONE HYDROCHLORIDE AND ACETAMINOPHEN PRN TAB: 10; 325 TABLET ORAL at 05:21

## 2017-01-30 RX ADMIN — LACTULOSE SCH GM: 20 SOLUTION ORAL at 09:36

## 2017-01-30 RX ADMIN — CEFAZOLIN SCH MLS/HR: 10 INJECTION, POWDER, FOR SOLUTION INTRAVENOUS at 03:35

## 2017-01-30 RX ADMIN — SIMETHICONE PRN MG: 80 TABLET, CHEWABLE ORAL at 09:46

## 2017-01-30 RX ADMIN — CEFAZOLIN SCH MLS/HR: 10 INJECTION, POWDER, FOR SOLUTION INTRAVENOUS at 12:04

## 2017-01-30 RX ADMIN — OXYCODONE HYDROCHLORIDE AND ACETAMINOPHEN PRN TAB: 10; 325 TABLET ORAL at 09:36

## 2017-01-30 RX ADMIN — DOCUSATE SODIUM PRN MG: 100 CAPSULE ORAL at 09:46

## 2017-01-30 NOTE — DISCHARGE SUMMARY-HOSPITALIST
Diagnosis/Chief Complaint


Date of Admission


Jan 28, 2017 at 12:49


Date of Discharge





Discharge Date:  Jan 30, 2017


Admission Diagnosis


Assessment:


Severe dental abscess with facial swelling and erythema requiring IV 

antibiotics due to failure of by mouth antibiotics


Recent hysterectomy by Dr. GREEN


Postop constipation





Discharge Diagnosis


Assessment:


Severe dental abscess with facial swelling and erythema requiring IV 

antibiotics due to failure of by mouth antibiotics


Recent hysterectomy by Dr. GREEN


Postop constipation


RUQ abdominal pain w/mild elevation in LFT's but normal bilirubin so ordered 

acute hepatitis panel and USG to be scheduled as outpt








Continue IV antibiotics and fentanyl and Percocet for pain


Lactulose and stool softeners and home medication


Hopefully oral surgery can see her tomorrow and remove abscess and placed back 

on oral antibiotics and proceeding on with dental extraction is planned








Reason Hospital Visit/Course


CC: Right tooth abscess





HPI: This is a patient of Dr Pinon' w/h/o Hyst by Dr Green 3 days ago that 

presents to the ER with right-sided facial swelling.  She just had surgery by 

Dr. GREEN and was having some difficulties with a molar intending to have all 

of her teeth removed once her tax return refund was processed but reported the 

pain and swelling that had increased even though she was placed on antibiotics 

orally.  She reluctantly agreed to staying in the hospital for IV antibiotics 

because she has a 2-month-old child and a 2-year-old along with 2 other 

children.  She is overall much improved since receiving IV antibiotics and pain 

medication but is still having some constipation from the hysterectomy.  

Overall the plan will be  consult and hopefully discharge to Dr. Ramírez's office tomorrow to drain abscess and ultimately continue oral 

antibiotics and close follow-up for dental extraction is planned.





Note from 1/30/17:


Patient doing well but still struggling with constipation


Right upper quadrant abdominal pain due to constipation has been an issue but 

not severe enough to require stay in hospital so we'll obtain hepatitis panel 

to rule out viral hepatitis due to elevated liver enzymes and ultrasound as an 

outpatient to evaluate liver and gallbladder.  The right side of her face is 

much improved with IV antibiotics and just requiring Percocet for pain.


Dr. Ramírez appointment pending at this time but will need abscess drained 

ultimately





No fever, vital stable, pleasant, oriented 3, improved


Right sided face much improved with only minimal erythema under the right eye 

no edema





Hospital course: Patient a brief hospital course she required IV antibiotics 

due to the severity of the tooth abscess that had progressed to facial 

cellulitis so she improved rapidly with IV antibiotics and Percocet for pain in 

addition to IV pain medication as needed.  Postop constipation from 

hysterectomy required laxatives then on day of discharge soapsuds enema to 

begin the evacuation process.  She will go home on lactulose due to the 

severity of her constipation.  Appointment was made with Dr. Montanez for close follow

-up for tooth abscess drainage along with Dr. GREEN for postop care from 

hysterectomy then will obtain ultrasound to evaluate liver status and add-on 

acute viral hepatitis panel to today's labs and follow-up with that.





Discharge Summary


Discharge Physical Examination


Allergies:  


Coded Allergies:  


     No Known Drug Allergies (Unverified , 1/19/17)


Vitals & I&Os





Vital Signs








  Date Time  Temp Pulse Resp B/P Pulse Ox O2 Delivery O2 Flow Rate FiO2


 


1/29/17 23:13 96.8 83 18 107/74 99 Room Air  











Hospital Course


Labs (last 24 hrs)


 Laboratory Tests


1/30/17 05:08: 


Alanine Aminotransferase (ALT/SGPT) 61H, Albumin 3.2, Alkaline Phosphatase 77, 

Anion Gap 7, Aspartate Amino Transf (AST/SGOT) 45H, BUN/Creatinine Ratio 7, 

Basophils # (Auto) 0.0, Basophils (%) (Auto) 0, Blood Urea Nitrogen 5L, Calcium 

Level 8.6, Carbon Dioxide Level 26, Chloride Level 105, Creatinine 0.70, 

Eosinophils # (Auto) 0.2, Eosinophils (%) (Auto) 3, Estimat Glomerular 

Filtration Rate > 60, Glucose Level 83, Hematocrit 35, Hemoglobin 11.5, 

Lymphocytes # (Auto) 1.9, Lymphocytes (%) (Auto) 29, Mean Corpuscular 

Hemoglobin 29, Mean Corpuscular Hemoglobin Concent 33, Mean Corpuscular Volume 

87, Mean Platelet Volume 9.8, Monocytes # (Auto) 0.4, Monocytes (%) (Auto) 6, 

Neutrophils # (Auto) 4.1, Neutrophils (%) (Auto) 62, Platelet Count 279, 

Potassium Level 4.3, Red Blood Count 3.99L, Red Cell Distribution Width 13.1, 

Sodium Level 138, Total Bilirubin 0.2, Total Protein 6.2L, White Blood Count 6.6





Pending Labs


Laboratory Tests


1/30/17 05:08: 


Alanine Aminotransferase (ALT/SGPT) 61, Albumin 3.2, Alkaline Phosphatase 77, 

Anion Gap 7, Aspartate Amino Transf (AST/SGOT) 45, BUN/Creatinine Ratio 7, 

Basophils # (Auto) 0.0, Basophils (%) (Auto) 0, Blood Urea Nitrogen 5, Calcium 

Level 8.6, Carbon Dioxide Level 26, Chloride Level 105, Creatinine 0.70, 

Eosinophils # (Auto) 0.2, Eosinophils (%) (Auto) 3, Estimat Glomerular 

Filtration Rate > 60, Glucose Level 83, Hematocrit 35, Hemoglobin 11.5, 

Hepatitis A IgM Antibody [Pending], Hepatitis B Core IgM Antibody [Pending], 

Hepatitis B Surface Antigen [Pending], Hepatitis C Antibody [Pending], 

Lymphocytes # (Auto) 1.9, Lymphocytes (%) (Auto) 29, Mean Corpuscular 

Hemoglobin 29, Mean Corpuscular Hemoglobin Concent 33, Mean Corpuscular Volume 

87, Mean Platelet Volume 9.8, Monocytes # (Auto) 0.4, Monocytes (%) (Auto) 6, 

Neutrophils # (Auto) 4.1, Neutrophils (%) (Auto) 62, Platelet Count 279, 

Potassium Level 4.3, Red Blood Count 3.99, Red Cell Distribution Width 13.1, 

Sodium Level 138, Total Bilirubin 0.2, Total Protein 6.2, White Blood Count 6.6








Discharge


Home Medications:





 Active Scripts


 Active


Percocet  mg Tablet (Oxycodone HCl/Acetaminophen) 1 Each Tablet 1 Each PO 

Q4H PRN


Augmentin 500-125 Tablet (Amoxicillin/Potassium Clav) 1 Each Tablet 1 Each PO 

BID


Lactulose 20 Gm/30 Ml Solution 10 Gm PO BID


Oxycodone-Acetaminophen 5-325 (Oxycodone HCl/Acetaminophen) 1 Each Tablet 2 Tab 

PO Q4H PRN


Docusate Sodium 100 Mg Capsule 100 Mg PO BID PRN


Simethicone 80 Mg Tab.chew 40 Mg PO TID PRN


Hydrocodon-Acetaminoph 7.5-325 (Hydrocodone/Acetaminophen) 1 Each Tablet 1-2 Ea 

PO Q6H PRN


Ibuprofen 600 Mg Tablet 600 Mg PO Q6H PRN


 Reported


Prozac (Fluoxetine HCl) 10 Mg Capsule 10 Mg PO DAILY


Fioricet -40 mg Capsule (Butalb/Acetaminophen/Caffeine) 1 Each Capsule 1 

Each PO Q6H PRN





Instructions to patient/family


Please see electonic discharge instructions given to patient.





Clinical Quality Measures


DVT/VTE Risk/Contraindication:


Risk Factor Score Per Nursing:  3


RFS Level Per Nursing on Admit:  3=High





Copy


Copies To 1:   EDELMIRA PINON MD, MINDI DO Jan 30, 2017 08:38

## 2017-01-30 NOTE — DISCHARGE INSTRUCTIONS
Discharge Instructions


Discharge Medications


New, Converted or Re-Newed RX:  Transmitted to Pharmacy


New Medications:  


Amoxicillin/Potassium Clav (Augmentin 500-125 Tablet) 1 Each Tablet


1 EACH PO BID #14 TAB


Oxycodone HCl/Acetaminophen (Percocet  mg Tablet) 1 Each Tablet


1 EACH PO Q4H PRN PAIN #40 TAB


Lactulose (Lactulose) 20 Gm/30 Ml Solution


10 GM PO BID #8 OZ


 


Continued Medications:  


Butalb/Acetaminophen/Caffeine (Fioricet -40 mg Capsule) 1 Each Capsule


1 EACH PO Q6H PRN MIGRAINE CAP


Docusate Sodium (Docusate Sodium) 100 Mg Capsule


100 MG PO BID PRN CONSTIPATION #40 CAP


Fluoxetine HCl (Prozac) 10 Mg Capsule


10 MG PO DAILY CAP


Ibuprofen (Ibuprofen) 600 Mg Tablet


600 MG PO Q6H PRN PAIN #80 TAB


Simethicone (Simethicone) 80 Mg Tab.chew


40 MG PO TID PRN INDIGESTION #40 TAB


 


Discontinued Medications:  


Hydrocodone/Acetaminophen (Hydrocodon-Acetaminoph 7.5-325) 1 Each Tablet


1-2 EA PO Q6H PRN PAIN #50 TAB


Oxycodone HCl/Acetaminophen (Oxycodone-Acetaminophen 5-325) 1 Each Tablet


2 TAB PO Q4H PRN MODERATE PAIN #50 TAB








Patient Instructions


Goal/Follow Up Appt:  


Dr Ramírez as scheduled for tooth abscess drainage


Dr Green as scheduled


Obtain ultrasound of liver as scheduled due to pain





Activity & Diet


Discharge Diet:  No Restrictions


Activity as Tolerated:  Yes








ADONAY JAMES DO Jan 30, 2017 08:34

## 2017-06-15 ENCOUNTER — HOSPITAL ENCOUNTER (OUTPATIENT)
Dept: HOSPITAL 75 - RAD | Age: 27
End: 2017-06-15
Attending: FAMILY MEDICINE
Payer: MEDICAID

## 2017-06-15 DIAGNOSIS — M54.2: ICD-10-CM

## 2017-06-15 DIAGNOSIS — M54.6: ICD-10-CM

## 2017-06-15 DIAGNOSIS — M54.5: ICD-10-CM

## 2017-06-15 DIAGNOSIS — M41.84: Primary | ICD-10-CM

## 2017-06-15 PROCEDURE — 72100 X-RAY EXAM L-S SPINE 2/3 VWS: CPT

## 2017-06-15 PROCEDURE — 72040 X-RAY EXAM NECK SPINE 2-3 VW: CPT

## 2017-06-15 PROCEDURE — 72072 X-RAY EXAM THORAC SPINE 3VWS: CPT

## 2017-06-15 NOTE — DIAGNOSTIC IMAGING REPORT
Three views of the lumbar spine.



INDICATION: Back pain.



FINDINGS:

There is a transitional lumbosacral junction with partial

lumbarization of S1 level. The vertebral body heights are

preserved. Disc heights are also preserved. No significant

osteophyte formation seen. The SI joints appear symmetric.



The paraspinal soft tissues appear unremarkable.



IMPRESSION: Transitional lumbosacral junction. No acute process.



Dictated by: 



  Dictated on workstation # [id number]

## 2017-06-15 NOTE — DIAGNOSTIC IMAGING REPORT
Three views of the thoracic spine.



INDICATION: Increasing pain.



FINDINGS:

There is mild left convexity scoliosis of the thoracic spine. The

alignment of the posterior spinal line is satisfactory. The

vertebral body heights are preserved. Disc heights are also

preserved. Minimal anterior osteophytes are seen in the mid

thoracic spine.



IMPRESSION: Scoliosis. Minimal anterior osteophytes in the mid

thoracic spine.



Dictated by: 



  Dictated on workstation # [id number]

## 2017-06-15 NOTE — DIAGNOSTIC IMAGING REPORT
3 views of the cervical spine.



Indication: neck pain.



Findings:

There is straightening of the cervical lordotic curvature. The

alignment of the posterior spinal line is satisfactory. The

vertebral body heights are preserved. Disc heights appear

preserved. No significant degenerative changes. There is a

satisfactory alignment at the lateral masses of C1 and C2. The

prevertebral soft tissues appear unremarkable.



Impression: Straightening of the cervical lordotic curvature

could be positional or related to muscle spasm.



Dictated by: 



  Dictated on workstation # [id number]

## 2017-07-20 ENCOUNTER — HOSPITAL ENCOUNTER (OUTPATIENT)
Dept: HOSPITAL 75 - RAD | Age: 27
End: 2017-07-20
Attending: FAMILY MEDICINE
Payer: MEDICAID

## 2017-07-20 DIAGNOSIS — M54.5: ICD-10-CM

## 2017-07-20 DIAGNOSIS — R93.7: Primary | ICD-10-CM

## 2017-07-20 PROCEDURE — 72148 MRI LUMBAR SPINE W/O DYE: CPT

## 2017-07-20 NOTE — DIAGNOSTIC IMAGING REPORT
PROCEDURE: MRI lumbar spine.



INDICATION:  Low back pain for a couple of years. No known

injury..



TECHNIQUE: Multiplanar and multisequence noncontrast magnetic

resonance imagine was performed of the lumbar spine.



CORRELATION STUDY:  Radiographs 6/15/2017.



FINDINGS: There is transitional anatomy at lumbosacral junction

with partial lumbarization of S1 level. There is trace

anterolisthesis of L4 on L5. Otherwise there is normal alignment

and curvature of the lumbar spine.  The lumbar vertebral body

heights are maintained and without geographic lesion.  Schmorl

node deformities, T11 and T12 endplates. The conus terminates at

L1 and appears unremarkable.



L5-S1: Somewhat small disc space but unremarkable.



L4-L5: Mild loss of disc space height and intrasubstance signal

intensity. There is near-midline disc protrusion which does

result in flattening of the ventral thecal sac. Area of disc

protrusion approximately 12 mm transverse x 7 mm AP. This does

result in some flattening of the ventral thecal sac but also some

corresponding to the foramina right slightly greater than left.

No high-degree mass effect upon the exiting nerve roots.



L3-L4: Mild ligamentum hypertrophy. Disc space maintained and

unremarkable. No significant stenosis.



L2-L3: Unremarkable. 



L1-L2: Unremarkable. 



The visualized  portions of the abdominal aorta and kidneys are

negative.  No pathologically enlarged central retroperitoneal

lymph nodes.





IMPRESSION:

1. Transitional anatomy at lumbosacral junction. Careful

correlation with numbering is advised if surgical intervention is

performed.

2. Given transitional anatomy, there is what appears to be

midline disc bulge at L4-L5 level resulting in mild flattening of

the ventral thecal sac and mild effacement of the foramina;

however, there does not appear to be high-degree central canal

stenosis demonstrated.



Dictated by: 



  Dictated on workstation # [id number]

## 2018-04-26 ENCOUNTER — HOSPITAL ENCOUNTER (EMERGENCY)
Dept: HOSPITAL 75 - ER | Age: 28
Discharge: HOME | End: 2018-04-26
Payer: MEDICAID

## 2018-04-26 VITALS — DIASTOLIC BLOOD PRESSURE: 84 MMHG | SYSTOLIC BLOOD PRESSURE: 122 MMHG

## 2018-04-26 VITALS — HEIGHT: 64 IN | WEIGHT: 178 LBS | BODY MASS INDEX: 30.39 KG/M2

## 2018-04-26 DIAGNOSIS — J45.909: ICD-10-CM

## 2018-04-26 DIAGNOSIS — G43.909: ICD-10-CM

## 2018-04-26 DIAGNOSIS — Z86.73: ICD-10-CM

## 2018-04-26 DIAGNOSIS — F32.9: ICD-10-CM

## 2018-04-26 DIAGNOSIS — Z82.49: ICD-10-CM

## 2018-04-26 DIAGNOSIS — Z87.59: ICD-10-CM

## 2018-04-26 DIAGNOSIS — K52.9: Primary | ICD-10-CM

## 2018-04-26 DIAGNOSIS — Z90.710: ICD-10-CM

## 2018-04-26 DIAGNOSIS — F41.9: ICD-10-CM

## 2018-04-26 DIAGNOSIS — Z87.891: ICD-10-CM

## 2018-04-26 DIAGNOSIS — Z87.448: ICD-10-CM

## 2018-04-26 LAB
ALBUMIN SERPL-MCNC: 3.9 GM/DL (ref 3.2–4.5)
ALP SERPL-CCNC: 47 U/L (ref 40–136)
ALT SERPL-CCNC: 13 U/L (ref 0–55)
AMORPH SED URNS QL MICRO: (no result) /LPF
APTT PPP: YELLOW S
BACTERIA #/AREA URNS HPF: NEGATIVE /HPF
BASOPHILS # BLD AUTO: 0 10^3/UL (ref 0–0.1)
BASOPHILS NFR BLD AUTO: 0 % (ref 0–10)
BILIRUB SERPL-MCNC: 0.5 MG/DL (ref 0.1–1)
BILIRUB UR QL STRIP: NEGATIVE
BUN/CREAT SERPL: 15
CALCIUM SERPL-MCNC: 8.9 MG/DL (ref 8.5–10.1)
CHLORIDE SERPL-SCNC: 112 MMOL/L (ref 98–107)
CO2 SERPL-SCNC: 22 MMOL/L (ref 21–32)
CREAT SERPL-MCNC: 0.73 MG/DL (ref 0.6–1.3)
EOSINOPHIL # BLD AUTO: 0.2 10^3/UL (ref 0–0.3)
EOSINOPHIL NFR BLD AUTO: 3 % (ref 0–10)
ERYTHROCYTE [DISTWIDTH] IN BLOOD BY AUTOMATED COUNT: 11.9 % (ref 10–14.5)
FIBRINOGEN PPP-MCNC: (no result) MG/DL
GFR SERPLBLD BASED ON 1.73 SQ M-ARVRAT: > 60 ML/MIN
GLUCOSE SERPL-MCNC: 99 MG/DL (ref 70–105)
GLUCOSE UR STRIP-MCNC: NEGATIVE MG/DL
HCT VFR BLD CALC: 38 % (ref 35–52)
HGB BLD-MCNC: 13.1 G/DL (ref 11.5–16)
KETONES UR QL STRIP: NEGATIVE
LEUKOCYTE ESTERASE UR QL STRIP: NEGATIVE
LYMPHOCYTES # BLD AUTO: 2.4 X 10^3 (ref 1–4)
LYMPHOCYTES NFR BLD AUTO: 37 % (ref 12–44)
MANUAL DIFFERENTIAL PERFORMED BLD QL: NO
MCH RBC QN AUTO: 30 PG (ref 25–34)
MCHC RBC AUTO-ENTMCNC: 34 G/DL (ref 32–36)
MCV RBC AUTO: 89 FL (ref 80–99)
MONOCYTES # BLD AUTO: 0.4 X 10^3 (ref 0–1)
MONOCYTES NFR BLD AUTO: 6 % (ref 0–12)
NEUTROPHILS # BLD AUTO: 3.5 X 10^3 (ref 1.8–7.8)
NEUTROPHILS NFR BLD AUTO: 54 % (ref 42–75)
NITRITE UR QL STRIP: NEGATIVE
PH UR STRIP: 8 [PH] (ref 5–9)
PLATELET # BLD: 300 10^3/UL (ref 130–400)
PMV BLD AUTO: 9.1 FL (ref 7.4–10.4)
POTASSIUM SERPL-SCNC: 3.8 MMOL/L (ref 3.6–5)
PROT SERPL-MCNC: 7.3 GM/DL (ref 6.4–8.2)
PROT UR QL STRIP: NEGATIVE
RBC # BLD AUTO: 4.31 10^6/UL (ref 4.35–5.85)
RBC #/AREA URNS HPF: (no result) /HPF
SODIUM SERPL-SCNC: 138 MMOL/L (ref 135–145)
SP GR UR STRIP: 1.01 (ref 1.02–1.02)
SQUAMOUS #/AREA URNS HPF: (no result) /HPF
UROBILINOGEN UR-MCNC: NORMAL MG/DL
WBC # BLD AUTO: 6.5 10^3/UL (ref 4.3–11)
WBC #/AREA URNS HPF: (no result) /HPF

## 2018-04-26 PROCEDURE — 80053 COMPREHEN METABOLIC PANEL: CPT

## 2018-04-26 PROCEDURE — 84703 CHORIONIC GONADOTROPIN ASSAY: CPT

## 2018-04-26 PROCEDURE — 81000 URINALYSIS NONAUTO W/SCOPE: CPT

## 2018-04-26 PROCEDURE — 76705 ECHO EXAM OF ABDOMEN: CPT

## 2018-04-26 PROCEDURE — 96360 HYDRATION IV INFUSION INIT: CPT

## 2018-04-26 PROCEDURE — 36415 COLL VENOUS BLD VENIPUNCTURE: CPT

## 2018-04-26 PROCEDURE — 85025 COMPLETE CBC W/AUTO DIFF WBC: CPT

## 2018-04-26 RX ADMIN — SODIUM CHLORIDE SCH MLS/HR: 900 INJECTION, SOLUTION INTRAVENOUS at 14:34

## 2018-04-26 NOTE — ED GI
General


Chief Complaint:  Abdominal/GI Problems


Stated Complaint:  RIGHT SIDE PAIN DIARRHEA VOMITING FEVER


Nursing Triage Note:  


PT AMBULATED TO RM 7 W/O DIFFICULTIES.  PT STATES SHE STARTED COUGHING AND 


VOMITING ON .  SYMPTOMS CONTINUED MONDAY WHEN DIARRHEA BEGAN MONDAY AS 


WELL.  PT THEN STARTED WITH R SIDED PAIN ON WED AND WAS RUNNING 101.2 FEVER.  

PT 


WAS SEEN IN DR'S OFFICE TODAY AND SENT HERE FOR CT SCAN


Sepsis Screen:  Possible Sepsis Risk


Source of Information:  Patient


Exam Limitations:  No Limitations





History of Present Illness


Date Seen by Provider:  2018


Time Seen by Provider:  15:07


Initial Comments


The patient is a 27-year-old white female who presents after having been seen 

at Dr. Ellyn Pinon office, and was advised after relating symptoms, to come 

to the emergency room for further study.  Patient reports that she was at work 

on  and began to have coughing.  After coughing particularly hard she 

then vomited.  She vomited 2 additional times and was then sent home by her 

employer.  This continued through Monday and Tuesday and on Wednesday she began 

to have diarrhea as well.  She describes feeling very hot at times and at other 

times having sweats and chills.  She estimates that she has 3-6 watery stools 

per day.  She has taken very little to eat and has mostly concentrated on 

fluids.


Timing/Duration:  4-5 Days


Severity/Quality:  Mild, Moderate


Location:  Generalized Abdomen





Allergies and Home Medications


Allergies


Coded Allergies:  


     No Known Drug Allergies (Unverified , 17)





Home Medications


Amoxicillin/Potassium Clav 1 Each Tablet, 1 EACH PO BID


   Prescribed by: ADONAY JAMES on 17


Butalb/Acetaminophen/Caffeine 1 Each Tablet, 1 TAB PO Q4H PRN for MIGRAINE, (

Reported)


Docusate Sodium 100 Mg Capsule, 100 MG PO BID PRN for CONSTIPATION


   Prescribed by: CARLINE VILLA on 17 09


Lactulose 20 Gm/30 Ml Solution, 10 GM PO BID


   Prescribed by: ADONAY JAMES on 17


Oxycodone HCl/Acetaminophen 1 Each Tablet, 1 EACH PO Q4H PRN for PAIN


   Prescribed by: ADONAY JAMES on 17 08


Phentermine HCl 37.5 Mg Tablet, 37.5 MG PO DAILY, (Reported)


Simethicone 80 Mg Tab.chew, 40 MG PO TID PRN for INDIGESTION


   Prescribed by: CARLINE VILLA on 17 0912





Patient Home Medication List


Home Medication List Reviewed:  Yes





Review of Systems


Constitutional:  see HPI


EENTM:  No Symptoms Reported


Respiratory:  Cough


Cardiovascular:  No Symptoms Reported


Gastrointestinal:  See HPI, Abdominal Pain, Diarrhea, Nausea, Vomiting


Genitourinary:  No Symptoms Reported


Musculoskeletal:  no symptoms reported


Skin:  no symptoms reported


Psychiatric/Neurological:  No Symptoms Reported


Endocrine:  No Symptoms Reported


Hematologic/Lymphatic:  No Symptoms Reported





Past Medical-Social-Family Hx


Patient Social History


Alcohol Use:  Denies Use


Recreational Drug Use:  No


Type Used:  Cigarettes


Former Smoker, Quit:  2016


2nd Hand Smoke Exposure:  Yes


Recent Foreign Travel:  No


Contact w/Someone Who Travel:  No


Recent Infectious Disease Expo:  No


Recent Hopitalizations:  Yes (PARTIAL HYST)


Physical Abuse:  No


Sexual Abuse:  No





Immunizations Up To Date


Tetanus Booster (TDap):  More than 5yrs


PED Vaccines UTD:  Yes





Seasonal Allergies


Seasonal Allergies:  No





Past Medical History


Surgeries:  Yes (CS X4)


 Section, Hysterectomy


Respiratory:  Yes (AS A CHILD)


Asthma


Cardiac:  No


Neurological:  Yes ("SEIZURE ACTIVITY"-PREGNANCY RELATED)


Headaches /Migraines, TIA


Pregnant:  No (HYSTERECTOMY )


Reproductive Disorders:  Yes (CPP, DYSMENORRHIA)


Female Reproductive Disorders:  Menstrual Problems, Endometriosis, Ovarian Cyst


Sexually Transmitted Disease:  No


HIV/AIDS:  No


Kidney Infection, Bladder Infection


Gastrointestinal:  No


Musculoskeletal:  No


Endocrine:  No


Loss of Vision:  Bilateral


Hearing Impairment:  Denies


Cancer:  No


Psychosocial:  Yes


Anxiety, Depression


Nursing Suicide Risk Score:  0


Integumentary:  No


Recent Skin Changes


Blood Disorders:  No


Adverse Reaction/Blood Tranf:  No





Family Medical History





Diabetes mellitus (grandmother)


Family history: Hypertension


  19 MOTHER


Hereditary disease


  daughter (Ruy Simons's syndrome)


Stroke


  19 FATHER


No Pertinent Family Hx





Physical Exam


Vital Signs





Vital Signs - First Documented








 18





 13:46


 


Temp 97.8


 


Pulse 98


 


Resp 20


 


B/P (MAP) 127/87 (100)


 


O2 Delivery Room Air





Capillary Refill : Less Than 3 Seconds


General Appearance:  mild distress


HEENT:  normal ENT inspection


Neck:  non-tender, full range of motion, supple, normal inspection


Respiratory:  chest non-tender, lungs clear, normal breath sounds, no 

respiratory distress, no accessory muscle use


Cardiovascular:  normal peripheral pulses, regular rate, rhythm, no edema, no 

gallop, no JVD, no murmur


Gastrointestinal:  abnormal bowel sounds (decreased), tenderness (right upper 

quadrant)


Extremities:  normal range of motion, non-tender, normal inspection, no pedal 

edema, no calf tenderness, normal capillary refill, pelvis stable


Neurologic/Psychiatric:  CNs II-XII nml as tested, no motor/sensory deficits, 

alert, normal mood/affect, oriented x 3


Skin:  normal color, warm/dry


Lymphatic:  no adenopathy





Progress/Results/Core Measures


Lab Results





Laboratory Tests








Test


 18


13:48 18


14:25 Range/Units


 


 


Urine Color YELLOW    


 


Urine Clarity


 SLIGHTLY


CLOUDY 


  





 


Urine pH 8   5-9  


 


Urine Specific Gravity 1.015 L  1.016-1.022  


 


Urine Protein NEGATIVE   NEGATIVE  


 


Urine Glucose (UA) NEGATIVE   NEGATIVE  


 


Urine Ketones NEGATIVE   NEGATIVE  


 


Urine Nitrite NEGATIVE   NEGATIVE  


 


Urine Bilirubin NEGATIVE   NEGATIVE  


 


Urine Urobilinogen NORMAL   NORMAL  MG/DL


 


Urine Leukocyte Esterase NEGATIVE   NEGATIVE  


 


Urine RBC (Auto) NEGATIVE   NEGATIVE  


 


Urine RBC NONE    /HPF


 


Urine WBC NONE    /HPF


 


Urine Squamous Epithelial


Cells 5-10 


 


  /HPF





 


Urine Crystals PRESENT H   /LPF


 


Urine Amorphous Sediment


 MOD 


PHOSPHATE H 


  /LPF





 


Urine Bacteria NEGATIVE    /HPF


 


Urine Casts NONE    /LPF


 


Urine Mucus NEGATIVE    /LPF


 


Urine Culture Indicated NO    


 


Urine Pregnancy Test NEGATIVE   NEGATIVE  


 


White Blood Count


 


 6.5 


 4.3-11.0


10^3/uL


 


Red Blood Count


 


 4.31 L


 4.35-5.85


10^6/uL


 


Hemoglobin  13.1  11.5-16.0  G/DL


 


Hematocrit  38  35-52  %


 


Mean Corpuscular Volume  89  80-99  FL


 


Mean Corpuscular Hemoglobin  30  25-34  PG


 


Mean Corpuscular Hemoglobin


Concent 


 34 


 32-36  G/DL





 


Red Cell Distribution Width  11.9  10.0-14.5  %


 


Platelet Count


 


 300 


 130-400


10^3/uL


 


Mean Platelet Volume  9.1  7.4-10.4  FL


 


Neutrophils (%) (Auto)  54  42-75  %


 


Lymphocytes (%) (Auto)  37  12-44  %


 


Monocytes (%) (Auto)  6  0-12  %


 


Eosinophils (%) (Auto)  3  0-10  %


 


Basophils (%) (Auto)  0  0-10  %


 


Neutrophils # (Auto)  3.5  1.8-7.8  X 10^3


 


Lymphocytes # (Auto)  2.4  1.0-4.0  X 10^3


 


Monocytes # (Auto)  0.4  0.0-1.0  X 10^3


 


Eosinophils # (Auto)


 


 0.2 


 0.0-0.3


10^3/uL


 


Basophils # (Auto)


 


 0.0 


 0.0-0.1


10^3/uL


 


Sodium Level  138  135-145  MMOL/L


 


Potassium Level  3.8  3.6-5.0  MMOL/L


 


Chloride Level  112 H   MMOL/L


 


Carbon Dioxide Level  22  21-32  MMOL/L


 


Anion Gap  4 L 5-14  MMOL/L


 


Blood Urea Nitrogen  11  7-18  MG/DL


 


Creatinine


 


 0.73 


 0.60-1.30


MG/DL


 


Estimat Glomerular Filtration


Rate 


 > 60 


  





 


BUN/Creatinine Ratio  15   


 


Glucose Level  99    MG/DL


 


Calcium Level  8.9  8.5-10.1  MG/DL


 


Total Bilirubin  0.5  0.1-1.0  MG/DL


 


Aspartate Amino Transf


(AST/SGOT) 


 17 


 5-34  U/L





 


Alanine Aminotransferase


(ALT/SGPT) 


 13 


 0-55  U/L





 


Alkaline Phosphatase  47    U/L


 


Total Protein  7.3  6.4-8.2  GM/DL


 


Albumin  3.9  3.2-4.5  GM/DL








My Orders





Orders - CHRISTIANA GONGORA MD


Cbc With Automated Diff (18 14:08)


Comprehensive Metabolic Panel (18 14:08)


Ua Culture If Indicated (18 14:08)


Ns Iv 1000 Ml (Sodium Chloride 0.9%) (18 14:15)


Hcg,Qualitative Urine (18 14:52)


Us Gallbladder 61132 (18 15:21)





Vital Signs/I&O











 18





 13:46


 


Temp 97.8


 


Pulse 98


 


Resp 20


 


B/P (MAP) 127/87 (100)


 


O2 Delivery Room Air














Blood Pressure Mean:  100











Departure


Communication (Admissions)


Because of focused pain in the right upper quadrant ultrasound was done.  

Although the gallbladder was somewhat contracted there were no evidence of 

stones.





Impression





 Primary Impression:  


 Nausea and vomiting


 Additional Impressions:  


 Gastroenteritis


 gastroenteritis


Disposition:  01 HOME, SELF-CARE


Condition:  Stable/Unchanged





Departure-Patient Inst.


Decision time for Depature:  16:00


Referrals:  


ELLYN PINON MD (PCP/Family)


Primary Care Physician


Patient Instructions:  No Instuctions Given





Add. Discharge Instructions:  


All discharge instructions reviewed with patient and/or family. Voiced 

understanding.


Use Zofran as needed to control nausea and vomiting.  Take only clear liquids.  

7-Up and Gatorade are suggested.  Do not attempt to eat until there has been no 

diarrhea for 24 hours.  At that point you may start with a few soda crackers, 

then go to broth and broth soup, next to steamed rice or mashed potatoes with 

broth if desired.  At that point you may move cautiously to solid food.


Scripts


Ondansetron (Zofran Odt) 8 Mg Tab.rapdis


1 TAB PO EVERY 4 HOURS, #10 TAB


   Prov: CHRISTIANA GONGORA MD         18











CHRISTIANA GONGORA MD 2018 15:12

## 2018-04-26 NOTE — DIAGNOSTIC IMAGING REPORT
PROCEDURE: US Gallbladder.



TECHNIQUE: Multiple real-time grayscale images were obtained over

the right upper quadrant in various projections.



INDICATION:  Abdominal pain. 



FINDINGS: Grayscale imaging of the gallbladder reveals no

intraluminal filling defect. There is no gallbladder wall

thickening or pericholecystic fluid. No intra or extrahepatic

biliary ductal dilatation is identified. Gallbladder is partially

contracted. The pancreas is largely obscured. No abdominal aortic

or inferior vena caval abnormality is identified. There is no

evidence of free fluid. Imaging was also performed in the right

lower quadrant without evidence of noncompressible tubular

structure to suggest an acute appendicitis.



IMPRESSION: Unremarkable right upper quadrant abdominal

ultrasound.



Dictated by: 



  Dictated on workstation # [id number]

## 2018-04-26 NOTE — XMS REPORT
Continuity of Care Document

 Created on: 2018



JORGE LOVE

External Reference #: [id number]

: 1990

Sex: Female



Demographics







 Address   [address]

[place]  [address]

 

 Home Phone  [phone] x

 

 Preferred Language  Unknown

 

 Marital Status  Unknown

 

 Faith Affiliation  Unknown

 

 Race  Unknown

 

 Ethnic Group  Unknown





Author







 Author  [facility]

 

 Address  Unknown

 

 Phone  Unavailable



              



Allergies

      





 Active            Description            Code            Type            
Severity            Reaction            Onset            Reported/Identified   
         Relationship to Patient            Clinical Status        

 

 Yes            CYCLOBENZAPRINE                                      UNKNOWN   
         GI PROBLEMS - NAUSEA                                           
                

 

 Yes            hydromorphone HCl            [id number]            Drug Allergy
            Mild            ITCHING                         10/15/2013         
                         

 

 Yes            No Known Drug Allergies            [id number]            Drug 
Allergy            Unknown            N/A                         2017   
                               



                      



Medications

      



There is no data.                  



Problems

      





 Date Dx Coded            Attending            Type            Code            
Diagnosis            Diagnosed By        

 

 2010            YAS DIAZ PSYD                         244.9  
          HYPOTHYROIDISM                     

 

 2010            YAS DIAZ PSYD                         278.00 
           OBESITY                     

 

 2010            YAS DIAZ PSYD                         V25.01 
           Oral Contraceptives                     

 

 2010            TRISTA PEACE R                         244.9 
           HYPOTHYROIDISM                     

 

 2010            TRISTA PEACE R                         278.00
            Obesity                     

 

 2010            FRANCI PEACEIA R                         V25.01
            Oral Contraceptives                     

 

 2010            AGA JOHNSON MD                         244.9         
   HYPOTHYROIDISM                     

 

 2010            AGA JOHNSON MD                         278.00        
    Obesity                     

 

 2010            AGA JOHNSON MD                         V25.01        
    Oral Contraceptives                     

 

 2010                                      244.9            
HYPOTHYROIDISM                     

 

 2010                                      278.00            Obesity     
                

 

 2010                                      V25.01            Oral 
Contraceptives                     

 

 2010                                      244.9            
HYPOTHYROIDISM                     

 

 2010                                      278.00            Obesity     
                

 

 2010                                      V25.01            Oral 
Contraceptives                     

 

 2010                                      244.9            
HYPOTHYROIDISM                     

 

 2010                                      278.00            Obesity     
                

 

 2010                                      V25.01            Oral 
Contraceptives                     

 

 2010            TRISTA PEACE R                         244.9 
           HYPOTHYROIDISM                     

 

 2010            TRISTA PEACE R                         278.00
            Obesity                     

 

 2010            TRISTA PEACE R                         V25.01
            Oral Contraceptives                     

 

 2010            ANDREW ROACH                         244.9      
      HYPOTHYROIDISM                     

 

 2010            ANDREW ROACH                         278.00     
       Obesity                     

 

 2010            ANDREW ROACH                         V25.01     
       Oral Contraceptives                     

 

 2010            MOSER DO, ESTIVEN K                         244.9          
  HYPOTHYROIDISM                     

 

 2010            MOSER DO, ESTIVEN K                         278.00         
   Obesity                     

 

 2010            MOSER DO, ESTIVEN K                         V25.01         
   Oral Contraceptives                     

 

 2010            MOSER DO, ESTIVEN K                         244.9          
  HYPOTHYROIDISM                     

 

 2010            MOSER DO, ESTIVEN K                         278.00         
   Obesity                     

 

 2010            MOSER DO, ESTIVEN K                         V25.01         
   Oral Contraceptives                     

 

 2010            YAS DIAZ PSYD L                         244.9  
          HYPOTHYROIDISM                     

 

 2010            YAS DIAZ PSYD L                         278.00 
           OBESITY                     

 

 2010            YAS DIAZ PSYD L                         V25.01 
           Oral Contraceptives                     

 

 2010            YAS DIAZ PSYD L                         V25.49 
           SURVEILLANCE OF OTHER CONTRACEPTIVE METHOD                     

 

 2010            TRISTA PEACE                         V25.49
            SURVEILLANCE OF OTHER CONTRACEPTIVE METHOD                     

 

 2010            AGA JOHNSON MD                         V25.49        
    SURVEILLANCE OF OTHER CONTRACEPTIVE METHOD                     

 

 2010                                      V25.49            SURVEILLANCE 
OF OTHER CONTRACEPTIVE METHOD                     

 

 2010                                      V25.49            SURVEILLANCE 
OF OTHER CONTRACEPTIVE METHOD                     

 

 2010                                      V25.49            SURVEILLANCE 
OF OTHER CONTRACEPTIVE METHOD                     

 

 2010            TRISTA PEACE                         V25.49
            SURVEILLANCE OF OTHER CONTRACEPTIVE METHOD                     

 

 2010            ANDREW ROACH                         V25.49     
       SURVEILLANCE OF OTHER CONTRACEPTIVE METHOD                     

 

 2010            MOSER DO, ESTIVEN K                         V25.49         
   SURVEILLANCE OF OTHER CONTRACEPTIVE METHOD                     

 

 2010            MOSER DO ESTIVEN K                         V25.49         
   SURVEILLANCE OF OTHER CONTRACEPTIVE METHOD                     

 

 2010            YAS DIAZ PSYD L                         V25.49 
           SURVEILLANCE OF OTHER CONTRACEPTIVE METHOD                     

 

 2011            YAS DIAZ PSYD L                         611.0  
          INFLAMMATORY DISEASE OF BREAST                     

 

 2011            TRISTA PEACE                         611.0 
           INFLAMMATORY DISEASE OF BREAST                     

 

 2011            AGA JOHNSON MD                         611.0         
   INFLAMMATORY DISEASE OF BREAST                     

 

 2011                                      611.0            INFLAMMATORY 
DISEASE OF BREAST                     

 

 2011                                      611.0            INFLAMMATORY 
DISEASE OF BREAST                     

 

 2011                                      611.0            INFLAMMATORY 
DISEASE OF BREAST                     

 

 2011            TRISTA PEACE                         611.0 
           INFLAMMATORY DISEASE OF BREAST                     

 

 2011            ANDREW ROACH                         611.0      
      INFLAMMATORY DISEASE OF BREAST                     

 

 2011            ESTIVEN MOSER DO K                         611.0          
  INFLAMMATORY DISEASE OF BREAST                     

 

 2011            MOSER MARIAH MALONEA K                         611.0          
  INFLAMMATORY DISEASE OF BREAST                     

 

 2011            YAS DIZA PSYD                         611.0  
          INFLAMMATORY DISEASE OF BREAST                     

 

 2011                         Ot            521.00                       
           

 

 2011                         Ot            525.9                        
          

 

 2011                         Ot            787.03                       
           

 

 2011            YAS DIAZ PSYD L                         278.01 
           OBESITY, MORBID (BMI >40)                     

 

 2011            YAS DIAZ PSYD L                         780.79 
           fatigue                     

 

 2011            TRISTA PEACE R                         278.01
            OBESITY MORBID                     

 

 2011            TRISTA PEACE R                         780.79
            fatigue                     

 

 2011            AGA JOHNSON MD                         278.01        
    OBESITY MORBID                     

 

 2011            AGA JOHNSON MD                         780.79        
    fatigue                     

 

 2011                                      278.01            OBESITY 
MORBID                     

 

 2011                                      780.79            fatigue     
                

 

 2011                                      278.01            OBESITY 
MORBID                     

 

 2011                                      780.79            fatigue     
                

 

 2011                                      278.01            OBESITY 
MORBID                     

 

 2011                                      780.79            fatigue     
                

 

 2011            TRISTA PEACE R                         278.01
            OBESITY MORBID                     

 

 2011            TRISTA PEAEC R                         780.79
            fatigue                     

 

 2011            ANDREW ROACH                         278.01     
       OBESITY MORBID                     

 

 2011            ANDREW ROACH                         780.79     
       FATIGUE                     

 

 2011            MOSER DO ESTIVEN K                         278.01         
   OBESITY MORBID                     

 

 2011            MOSER DOMARIAHA K                         780.79         
   FATIGUE                     

 

 2011            MOSER DO ESTIVEN K                         278.01         
   OBESITY MORBID                     

 

 2011            MOSER DO ESTIVEN K                         780.79         
   FATIGUE                     

 

 2011            YAS DIAZ PSYD L                         278.01 
           OBESITY, MORBID (BMI >40)                     

 

 2011            YAS DIAZ PSYD L                         780.79 
           fatigue                     

 

 2011            YAS DIAZ PSYD                         616.10 
           VAGINITIS VULVOVAGINITIS UNSPECIFIED                     

 

 2011            YAS DIAZ PSYD                         V25.09 
           CONTRACEPTIVE COUNSELING                     

 

 2011            YAS DIAZ PSYD                         V72.31 
           GYN EXAM, ROUTINE                     

 

 2011            TRISTA PEACE                         616.10
            VAGINITIS VULVOVAGINITIS UNSPECIFIED                     

 

 2011            TRISTA PEACE R                         V25.09
            CONTRACEPTIVE COUNSELING                     

 

 2011            TRISTA PEACE                         V72.31
            GYN EXAM, ROUTINE                     

 

 2011            AGA JOHNSON MD                         616.10        
    VAGINITIS VULVOVAGINITIS UNSPECIFIED                     

 

 2011            AGA JOHNSON MD                         V25.09        
    CONTRACEPTIVE COUNSELING                     

 

 2011            AGA JOHNSON MD                         V72.31        
    GYN EXAM, ROUTINE                     

 

 2011                                      616.10            VAGINITIS 
VULVOVAGINITIS UNSPECIFIED                     

 

 2011                                      V25.09            
CONTRACEPTIVE COUNSELING                     

 

 2011                                      V72.31            GYN EXAM, 
ROUTINE                     

 

 2011                                      616.10            VAGINITIS 
VULVOVAGINITIS UNSPECIFIED                     

 

 2011                                      V25.09            
CONTRACEPTIVE COUNSELING                     

 

 2011                                      V72.31            GYN EXAM, 
ROUTINE                     

 

 2011                                      616.10            VAGINITIS 
VULVOVAGINITIS UNSPECIFIED                     

 

 2011                                      V25.09            
CONTRACEPTIVE COUNSELING                     

 

 2011                                      V72.31            GYN EXAM, 
ROUTINE                     

 

 2011            TRISTA PEACE                         616.10
            VAGINITIS VULVOVAGINITIS UNSPECIFIED                     

 

 2011            TRISTA PEACE                         V25.09
            CONTRACEPTIVE COUNSELING                     

 

 2011            TRISTA PEACE                         V72.31
            GYN EXAM, ROUTINE                     

 

 2011            ANDREW ROACH                         616.10     
       VAGINITIS VULVOVAGINITIS UNSPECIFIED                     

 

 2011            ANDREW ROACH                         V25.09     
       CONTRACEPTIVE COUNSELING                     

 

 2011            ANDREW ROACH                         V72.31     
       GYN EXAM, ROUTINE                     

 

 2011            ESTIVEN MOSER DO                         616.10         
   VAGINITIS VULVOVAGINITIS UNSPECIFIED                     

 

 2011            ESTIVEN MOSER DO                         V25.09         
   CONTRACEPTIVE COUNSELING                     

 

 2011            ESTIVEN MOSER DO                         V72.31         
   GYN EXAM, ROUTINE                     

 

 2011            ESTIVEN MOSER DO                         616.10         
   VAGINITIS VULVOVAGINITIS UNSPECIFIED                     

 

 2011            MOSER ESTIVEN MALONE                         V25.09         
   CONTRACEPTIVE COUNSELING                     

 

 2011            SHANTI MALONEESTIVEN                         V72.31         
   GYN EXAM, ROUTINE                     

 

 2011            YAS DIAZ PSYD                         616.10 
           VAGINITIS VULVOVAGINITIS UNSPECIFIED                     

 

 2011            YAS DIAZ PSYD                         V25.09 
           CONTRACEPTIVE COUNSELING                     

 

 2011            YAS DIAZ PSYD                         V72.31 
           GYN EXAM, ROUTINE                     

 

 2011                         Ot            729.1                        
          

 

 2011                         Ot            729.5                        
          

 

 2012                         Ot            616.0                        
          

 

 2012                         Ot            623.8                        
          

 

 2012                         Ot            041.49                       
           

 

 2012                         Ot            564.00                       
           

 

 2012                         Ot            590.80                       
           

 

 2012            YAS DIAZ PSYD                         309.81 
           AN PTSD                     

 

 2012            TRISTA PEACE                         309.81
            AN PTSD                     

 

 2012            AGA JOHNSON MD                         309.81        
    AN PTSD                     

 

 2012                                      309.81            AN PTSD     
                

 

 2012                                      309.81            AN PTSD     
                

 

 2012                                      309.81            AN PTSD     
                

 

 2012            TRISTA PEACE                         309.81
            AN PTSD                     

 

 2012            ANDREW ROACH                         309.81     
       AN PTSD                     

 

 2012            ESTIVEN MOSER DO                         309.81         
   AN PTSD                     

 

 2012            ESTIVEN MOSER DO                         309.81         
   AN PTSD                     

 

 2012            YAS DIAZ PSYD                         309.81 
           AN PTSD                     

 

 2012            YAS DIAZ PSYD                         724.5  
          BACK PAIN, GENERAL                     

 

 2012            YAS DIAZ PSYD                         788.1  
          DYSURIA                     

 

 2012            TRISTA PEACE                         724.5 
           BACK PAIN, GENERAL                     

 

 2012            TRISTA PEACE                         788.1 
           pain during urination (dysuria)                     

 

 2012            AGA JOHNSON MD                         724.5         
   BACK PAIN, GENERAL                     

 

 2012            AGA JOHNSON MD                         788.1         
   pain during urination (dysuria)                     

 

 2012                                      724.5            BACK PAIN, 
GENERAL                     

 

 2012                                      788.1            pain during 
urination (dysuria)                     

 

 2012                                      724.5            BACK PAIN, 
GENERAL                     

 

 2012                                      788.1            pain during 
urination (dysuria)                     

 

 2012                                      724.5            BACK PAIN, 
GENERAL                     

 

 2012                                      788.1            pain during 
urination (dysuria)                     

 

 2012            TRISTA PEACE R                         724.5 
           BACK PAIN, GENERAL                     

 

 2012            TRISTA PEACE R                         788.1 
           pain during urination (dysuria)                     

 

 2012            ANDREW ROACH                         724.5      
      BACK PAIN, GENERAL                     

 

 2012            ANDREW ROACH                         788.1      
      pain during urination (dysuria)                     

 

 2012            MOSER DO, ESTIVEN K                         724.5          
  BACK PAIN, GENERAL                     

 

 2012            MOSER DO, ESTIVEN K                         788.1          
  pain during urination (dysuria)                     

 

 2012            MOSER DO, ESTIVEN K                         724.5          
  BACK PAIN, GENERAL                     

 

 2012            MOSER DO, ESTIVEN K                         788.1          
  pain during urination (dysuria)                     

 

 2012            YAS DIAZ PSYD                         724.5  
          BACK PAIN, GENERAL                     

 

 2012            YAS DIAZ PSYD L                         788.1  
          DYSURIA                     

 

 2013                         Ot            276.51                       
           

 

 2013                         Ot            599.0                        
          

 

 2013                         Ot            780.4                        
          

 

 2013            TRISTA PEACE R                         789.09
            flank pain right                     

 

 2013            AGA JOHNSON MD                         789.09        
    flank pain right                     

 

 2013                                      789.09            flank pain 
right                     

 

 2013                                      789.09            flank pain 
right                     

 

 2013                                      789.09            flank pain 
right                     

 

 2013            TRISTA PEACE R                         789.09
            flank pain right                     

 

 2013            ANDREW ROACH                         789.09     
       FLANK PAIN RIGHT                     

 

 2013            MOSER DO, ESTIVEN K                         789.09         
   FLANK PAIN RIGHT                     

 

 2013            MOSER DO, ESTIVEN K                         789.09         
   FLANK PAIN RIGHT                     

 

 2013            AGA JOHNSON MD                         278.00        
    OBESITY                     

 

 2013            AGA JOHNSON MD                         724.2         
   BACK PAIN, LOWER                     

 

 2013            ELIZABETH PAVON, AGA                         789.05        
    ABDOMINAL PAIN PERIUMBILIC                     

 

 2013                                      278.00            OBESITY     
                

 

 2013                                      724.2            BACK PAIN, 
LOWER                     

 

 2013                                      789.05            ABDOMINAL 
PAIN PERIUMBILIC                     

 

 2013                                      278.00            OBESITY     
                

 

 2013                                      724.2            BACK PAIN, 
LOWER                     

 

 2013                                      789.05            ABDOMINAL 
PAIN PERIUMBILIC                     

 

 2013                                      278.00            OBESITY     
                

 

 2013                                      724.2            BACK PAIN, 
LOWER                     

 

 2013                                      789.05            ABDOMINAL 
PAIN PERIUMBILIC                     

 

 2013            TRISTA PEACE R                         278.00
            OBESITY                     

 

 2013            TRISTA PEACE R                         724.2 
           BACK PAIN, LOWER                     

 

 2013            TRISTA PEACE R                         789.05
            ABDOMINAL PAIN PERIUMBILIC                     

 

 2013            ANDREW ROACH                         278.00     
       OBESITY                     

 

 2013            ANDREW ROACH                         724.2      
      BACK PAIN, LOWER                     

 

 2013            ANDREW ROACH                         789.05     
       ABDOMINAL PAIN PERIUMBILIC                     

 

 2013            MOSER DO, ESTIVEN K                         278.00         
   OBESITY                     

 

 2013            MOSER DO, ESTIVEN K                         724.2          
  BACK PAIN, LOWER                     

 

 2013            MOSER DO, ESTIVEN K                         789.05         
   ABDOMINAL PAIN PERIUMBILIC                     

 

 2013            MOSER DO, ESTIVEN K                         278.00         
   OBESITY                     

 

 2013            MOSER DO, ESTIVEN K                         724.2          
  BACK PAIN, LOWER                     

 

 2013            MOSER DO, ESTIVEN K                         789.05         
   ABDOMINAL PAIN PERIUMBILIC                     

 

 2013                                      626.4            IRREGULAR 
MENSTRUAL CYCLE                     

 

 2013                                      783.5            POLYDIPSIA   
                  

 

 2013                                      788.41            URINARY 
FREQUENCY                     

 

 2013                                      626.4            IRREGULAR 
MENSTRUAL CYCLE                     

 

 2013                                      783.5            POLYDIPSIA   
                  

 

 2013                                      788.41            URINARY 
FREQUENCY                     

 

 2013                                      626.4            IRREGULAR 
MENSTRUAL CYCLE                     

 

 2013                                      783.5            POLYDIPSIA   
                  

 

 2013                                      788.41            URINARY 
FREQUENCY                     

 

 2013            TRISTA PEACE R                         626.4 
           IRREGULAR MENSTRUAL CYCLE                     

 

 2013            TRISTA PEACE R                         783.5 
           POLYDIPSIA                     

 

 2013            TRISTA PEACE R                         788.41
            URINARY FREQUENCY                     

 

 2013            ANDREW ROACH T                         626.4      
      IRREGULAR MENSTRUAL CYCLE                     

 

 2013            ANDREW ROACH T                         783.5      
      POLYDIPSIA                     

 

 2013            ANDREW ROACH T                         788.41     
       URINARY FREQUENCY                     

 

 2013            MOSER DO, ESTIVEN K                         626.4          
  IRREGULAR MENSTRUAL CYCLE                     

 

 2013            MOSER DO, ESTIVEN K                         783.5          
  POLYDIPSIA                     

 

 2013            MOSER DO, ESTIVEN K                         788.41         
   URINARY FREQUENCY                     

 

 2013            MOSER DO, ESTIVEN K                         626.4          
  IRREGULAR MENSTRUAL CYCLE                     

 

 2013            MOSER DO, ESTIVEN K                         783.5          
  POLYDIPSIA                     

 

 2013            MOSER DO, ESTIVEN K                         788.41         
   URINARY FREQUENCY                     

 

 2013                                      569.42            ANAL OR 
RECTAL PAIN                     

 

 2013                                      625.9            PELVIC PAIN  
                   

 

 2013                                      569.42            ANAL OR 
RECTAL PAIN                     

 

 2013                                      625.9            PELVIC PAIN  
                   

 

 2013            FRANCI PEACEIA R                         569.42
            ANAL OR RECTAL PAIN                     

 

 2013            FRANCI PEACEIA R                         625.9 
           PELVIC PAIN                     

 

 2013            ANDREW ROACH                         569.42     
       ANAL OR RECTAL PAIN                     

 

 2013            ANDREW ROACH T                         625.9      
      PELVIC PAIN                     

 

 2013            MOSER DO, ESTIVEN K                         569.42         
   ANAL OR RECTAL PAIN                     

 

 2013            MOSER DO, ESTIVEN K                         625.9          
  PELVIC PAIN                     

 

 2013            MOSER DO, ESTIVEN K                         569.42         
   ANAL OR RECTAL PAIN                     

 

 2013            MOSER DO, ESTIVEN K                         625.9          
  PELVIC PAIN                     

 

 2013                                      786.2            COUGH        
             

 

 2013            FRANCI PEACEIA R                         786.2 
           COUGH                     

 

 2013            ANDREW ROACH                         786.2      
      COUGH                     

 

 2013            MOSER DO, ESTIVEN K                         786.2          
  COUGH                     

 

 2013            MOSER DO, ESTIVEN K                         786.2          
  COUGH                     

 

 10/10/2013            TRISTA PEACE R                         599.0 
           URINARY TRACT INFECTION                     

 

 10/10/2013            ANDREW ROACH                         599.0      
      URINARY TRACT INFECTION                     

 

 10/10/2013            MOSER DO, ESTIVEN K                         599.0          
  URINARY TRACT INFECTION                     

 

 10/10/2013            MOSER DO, ESTIVEN K                         599.0          
  URINARY TRACT INFECTION                     

 

 2013            ESTIVEN MOSER DO                         782.1          
  RASH AND OTHER NONSPECIFIC SKIN ERUPTION                     

 

 2013            ESTIVEN MOSER DO                         782.1          
  RASH AND OTHER NONSPECIFIC SKIN ERUPTION                     

 

 2013            ESTIVEN MOSER DO                         V72.42         
   PREGNANCY TEST POSITIVE RESULT                     

 

 2013            JACKELIN BYRD DO            Ot            623.8          
                        

 

 2013            JACKELIN BYRD DO            Ot            640.03         
                         

 

 2013            JACKELIN BYRD DO            Ot            696.3          
                        

 

 2014            CARLINE VILLA DO            Ot            338.29    
        OTHER CHRONIC PAIN                     

 

 2014            DAISY MALONE CARLINE S            Ot            493.90    
        ASTHMA, UNSPECIFIED                     

 

 2014            ADISY MALONE CARLINE S            Ot            642.33    
        TRANS HYPERTEN-ANTEPART                     

 

 2014            DAISY MALONE CARLINE S            Ot            648.93    
        OTH CURR COND-ANTEPARTUM                     

 

 2014            DAISY MALONE CARLINE AHUJA            Ot            784.0     
       HEADACHE                     

 

 2014            DAISY MALONE CARLINE AHUJA            Ot            V12.54    
        PERSONAL HX OF TIA,  CEREBRAL INFARCTION                     

 

 2014            DAISY MALONE CARLINE S            Ot            525.9     
                             

 

 2014            FENECH  CARLINE S            Ot            648.93    
                              

 

 2014            BHAVANIECH  CARLINE S            Ot            784.0     
                             

 

 2014            FENECH DO CARLINE S            Ot            787.02    
                              

 

 2014            FENECH  CARLINE S            Ot            599.0     
                             

 

 2014            FENECH  CARLINE S            Ot            646.63    
                              

 

 2014            FENECH  CARLINE S            Ot            V22.1     
                             

 

 2014            FENECH DO CARLINE S            Ot            644.03    
                              

 

 2014            FENECH DO CARLINE S            Ot            654.23    
                              

 

 2014            FENECH DO CARLINE S            Ot            285.1     
                             

 

 2014            FENECH DO CARLINE S            Ot            648.22    
                              

 

 2014            FENECH DO CARLINE S            Ot            654.21    
                              

 

 2014            FENECH DO CARLINE S            Ot            V27.0     
                             

 

 2016            AZIZA SNELL            Ot            N83.20   
         UNSPECIFIED OVARIAN CYSTS                     

 

 2016            AZIZA SNELL            Ot            Z32.02   
         ENCOUNTER FOR PREGNANCY TEST, RESULT NEG                     

 

 2016            AZIZA SNELL            Ot            N39.0    
        URINARY TRACT INFECTION, SITE NOT SPECIF                     

 

 2016            AZIZA SNELL            Ot            O20.0    
        THREATENED                      

 

 2016            AZIZA SNELL            Ot            Z3A.01   
         LESS THAN 8 WEEKS GESTATION OF PREGNANCY                     

 

 2016            AZIZA SNELL            Ot            Z87.891  
          PERSONAL HISTORY OF NICOTINE DEPENDENCE                     

 

 2016            FENECH DO, CARLINE S            Ot            N13.1     
       HYDRONEPHROSIS W URETERAL STRICTURE, NEC                     

 

 2016            FENECH DO, CARLINE S            Ot            O43.192   
         OTHER MALFORMATION OF PLACENTA, SECOND T                     

 

 2016            FENECH DO, CARLINE S            Ot            O99.89    
        OTH DISEASES AND CONDITIONS COMPL PREG/C                     

 

 2016            FENECH DO, CARLINE S            Ot            R09.89    
        OTH SYMPTOMS AND SIGNS INVOLVING THE CIR                     

 

 2016            FENECH DO, CARLINE S            Ot            O34.21    
        MATERNAL CARE FOR SCAR FROM PREVIOUS ROXANA                     

 

 2016            FENECH DO, CARLINE S            Ot            O43.192   
         OTHER MALFORMATION OF PLACENTA, SECOND T                     

 

 2016            FENECH DO, CARLINE S            Ot            R09.89    
        OTH SYMPTOMS AND SIGNS INVOLVING THE CIR                     

 

 2016            FENECH DO, CARLINE S            Ot            N13.1     
       HYDRONEPHROSIS W URETERAL STRICTURE, NEC                     

 

 2016            FENECH DO, CARLINE S            Ot            O43.192   
         OTHER MALFORMATION OF PLACENTA, SECOND T                     

 

 2016            FENECH DO, CARLINE S            Ot            O99.89    
        OTH DISEASES AND CONDITIONS COMPL PREG/C                     

 

 2016            FENECH DO, CARLINE S            Ot            R09.89    
        OTH SYMPTOMS AND SIGNS INVOLVING THE CIR                     

 

 2016            FENECH DO, CARLINE S            Ot            N13.1     
       HYDRONEPHROSIS W URETERAL STRICTURE, NEC                     

 

 2016            FENECH DO, CARLINE S            Ot            O43.192   
         OTHER MALFORMATION OF PLACENTA, SECOND T                     

 

 2016            FENECH DO, CARLINE S            Ot            O99.89    
        OTH DISEASES AND CONDITIONS COMPL PREG/C                     

 

 2016            FENECH DO, CARLINE S            Ot            R09.89    
        OTH SYMPTOMS AND SIGNS INVOLVING THE CIR                     

 

 2016            FENECH DO, CARLINE S            Ot            O34.21    
        MATERNAL CARE FOR SCAR FROM PREVIOUS ROXANA                     

 

 2016            FENECH DO, CARLINE S            Ot            O43.192   
         OTHER MALFORMATION OF PLACENTA, SECOND T                     

 

 2016            FENECH DO, CARLINE S            Ot            R09.89    
        OTH SYMPTOMS AND SIGNS INVOLVING THE CIR                     

 

 10/05/2016            FENECH DO, CARLINE S            Ot            N13.1     
       HYDRONEPHROSIS W URETERAL STRICTURE, NEC                     

 

 10/05/2016            FENECH DO, CARLINE S            Ot            O43.192   
         OTHER MALFORMATION OF PLACENTA, SECOND T                     

 

 10/05/2016            FENECH DO, CARLINE S            Ot            O99.89    
        OTH DISEASES AND CONDITIONS COMPL PREG/C                     

 

 10/05/2016            FENECH DO, CARLINE S            Ot            R09.89    
        OTH SYMPTOMS AND SIGNS INVOLVING THE CIR                     

 

 10/05/2016            FENECH DO, CARLINE S            Ot            O34.21    
        MATERNAL CARE FOR SCAR FROM PREVIOUS ROXANA                     

 

 10/05/2016            FENECH DO, CARLINE S            Ot            O43.192   
         OTHER MALFORMATION OF PLACENTA, SECOND T                     

 

 10/05/2016            FENECH DO, CARLINE S            Ot            R09.89    
        OTH SYMPTOMS AND SIGNS INVOLVING THE CIR                     

 

 10/05/2016            JUSTIN PAVON, ANNA N            Ot            M54.5       
     LOW BACK PAIN                     

 

 10/05/2016            JUSTIN PAVON, ANNA N            Ot            O99.89      
      OTH DISEASES AND CONDITIONS COMPL PREG/C                     

 

 10/05/2016            JUSTIN PAVON, ANNA N            Ot            Z3A.32      
      32 WEEKS GESTATION OF PREGNANCY                     

 

 10/10/2016            JUSTIN PAVON, ANNA N            Ot            M54.5       
     LOW BACK PAIN                     

 

 10/10/2016            JUSTIN PAVON, ANNA N            Ot            O99.89      
      OTH DISEASES AND CONDITIONS COMPL PREG/C                     

 

 10/10/2016            JUSTIN PAVON, ANNA N            Ot            Z3A.32      
      32 WEEKS GESTATION OF PREGNANCY                     

 

 10/10/2016            FENECH DO, CARLINE S            Ot            N13.1     
       HYDRONEPHROSIS W URETERAL STRICTURE, NEC                     

 

 10/10/2016            FENECH DO, CARLINE S            Ot            O43.192   
         OTHER MALFORMATION OF PLACENTA, SECOND T                     

 

 10/10/2016            FENECH DO, CARLINE S            Ot            O99.89    
        OTH DISEASES AND CONDITIONS COMPL PREG/C                     

 

 10/10/2016            FENECH DO, CARLINE S            Ot            R09.89    
        OTH SYMPTOMS AND SIGNS INVOLVING THE CIR                     

 

 10/10/2016            FENECH DO, CARLINE S            Ot            O34.21    
        MATERNAL CARE FOR SCAR FROM PREVIOUS ROXANA                     

 

 10/10/2016            FENECH DO, CARLINE S            Ot            O43.192   
         OTHER MALFORMATION OF PLACENTA, SECOND T                     

 

 10/10/2016            FENECH DO, CARLINE S            Ot            R09.89    
        OTH SYMPTOMS AND SIGNS INVOLVING THE CIR                     

 

 10/12/2016            JUSTIN PAVON, ANNA BENITEZ            Ot            M54.5       
     LOW BACK PAIN                     

 

 10/12/2016            ANNA ANDERSON MD            Ot            O99.89      
      OTH DISEASES AND CONDITIONS COMPL PREG/C                     

 

 10/12/2016            ANNA ANDERSON MD            Ot            Z3A.32      
      32 WEEKS GESTATION OF PREGNANCY                     

 

 10/24/2016            BETSY TOVAR MD, Ot            O47.9
            FALSE LABOR, UNSPECIFIED                     

 

 10/24/2016            BETSY TOVAR MD, Ot            
Z3A.35            35 WEEKS GESTATION OF PREGNANCY                     

 

 10/27/2016            BETSY TOVAR MD, Ot            O47.9
            FALSE LABOR, UNSPECIFIED                     

 

 10/27/2016            BETSY TOVAR MD, Ot            
Z3A.00            WEEKS OF GESTATION OF PREGNANCY NOT SPEC                     

 

 10/27/2016            BETSY TOVAR MD, Ot            O47.9
            FALSE LABOR, UNSPECIFIED                     

 

 10/27/2016            BETSY TOVAR MD, Ot            
Z3A.35            35 WEEKS GESTATION OF PREGNANCY                     

 

 2016            CARLINE VILLA DO            Ot            O26.893   
         OTH PREGNANCY RELATED CONDITIONS, THIRD                      

 

 2016            CARLINE VILLA DO            Ot            O34.211   
         MATERN CARE FOR LOW TRANSVERSE SCAR FROM                     

 

 2016            CARLINE VILLA DO            Ot            R10.2     
       PELVIC AND PERINEAL PAIN                     

 

 2016            CARLINE VILLA DO            Ot            Z3A.37    
        37 WEEKS GESTATION OF PREGNANCY                     

 

 2017            CARLINE VILLA DO            Ot            N13.1     
       HYDRONEPHROSIS W URETERAL STRICTURE, NEC                     

 

 2017            CARLINE VILLA DO            Ot            O43.192   
         OTHER MALFORMATION OF PLACENTA, SECOND T                     

 

 2017            CARLINE VILLA DO S            Ot            O99.89    
        OTH DISEASES AND CONDITIONS COMPL PREG/C                     

 

 2017            CARLINE VILLA DO S            Ot            R09.89    
        OTH SYMPTOMS AND SIGNS INVOLVING THE CIR                     

 

 2017            CARLINE VILLA DO S            Ot            O34.21    
        MATERNAL CARE FOR SCAR FROM PREVIOUS ROXANA                     

 

 2017            CARLINE VILLA DO S            Ot            O43.192   
         OTHER MALFORMATION OF PLACENTA, SECOND T                     

 

 2017            CARLINE VILLA DO S            Ot            R09.89    
        OTH SYMPTOMS AND SIGNS INVOLVING THE CIR                     

 

 2017            CARLINE VILLA DO S            Ot            N80.9     
       ENDOMETRIOSIS, UNSPECIFIED                     

 

 2017            DAISY MALONE CARLINE S            Ot            Z01.812   
         ENCOUNTER FOR PREPROCEDURAL LABORATORY E                     

 

 2017            DAISY MALONECARLINE            Ot            N80.9     
       ENDOMETRIOSIS, UNSPECIFIED                     

 

 2017            DAISY MALONE CARLINE SEYMOUR Robles            Z01.812   
         ENCOUNTER FOR PREPROCEDURAL LABORATORY E                     

 

 2017            DAISY CARLINE MALONE            Ot            N13.1     
       HYDRONEPHROSIS W URETERAL STRICTURE, NEC                     

 

 2017            BHAVANICARLINE GARCIA DO            Ot            O43.192   
         OTHER MALFORMATION OF PLACENTA, SECOND T                     

 

 2017            DAISY MALONECARLINE            Ot            O99.89    
        OTH DISEASES AND CONDITIONS COMPL PREG/C                     

 

 2017            CARLINE VILLA DO            Ot            R09.89    
        OTH SYMPTOMS AND SIGNS INVOLVING THE CIR                     

 

 2017            DAISY CARLINE MALONE            Ot            O34.21    
        MATERNAL CARE FOR SCAR FROM PREVIOUS ROXANA                     

 

 2017            CARLINE VILLA DO            Ot            O43.192   
         OTHER MALFORMATION OF PLACENTA, SECOND T                     

 

 2017            CARLINE VILLA DO            Ot            R09.89    
        OTH SYMPTOMS AND SIGNS INVOLVING THE CIR                     

 

 2017            DAISY CARLINE MALONE            Ot            N73.6     
       FEMALE PELVIC PERITONEAL ADHESIONS (POST                     

 

 2017            BHAVANICARLINE GARCIA DO            Ot            N94.5     
       SECONDARY DYSMENORRHEA                     

 

 2017            CARLINE VILLA DO            Ot            R10.2     
       PELVIC AND PERINEAL PAIN                     

 

 2017            CARLINE VILLA DO            Ot            R33.9     
       RETENTION OF URINE, UNSPECIFIED                     

 

 2017            BHAVANICARLINE GARCIA DO            Ot            Z87.42    
        PERSONAL HISTORY OF OTH DISEASES OF THE                      

 

 2017            ADONAY JAMES DO            Ot            F17.210       
     NICOTINE DEPENDENCE, CIGARETTES, UNCOMPL                     

 

 2017            ADONAY JAMES DO            Ot            K04.7         
   PERIAPICAL ABSCESS WITHOUT SINUS                     

 

 2017            ADONAY JAMES DO            Ot            K59.09        
    OTHER CONSTIPATION                     

 

 2017            ADONAY JAMES DO            Ot            L03.211       
     CELLULITIS OF FACE                     

 

 2017            ADONAY JAMES DO            Ot            R79.89        
    OTHER SPECIFIED ABNORMAL FINDINGS OF BLO                     

 

 2017            ADONAY JAMES DO            Ot            F17.210       
     NICOTINE DEPENDENCE, CIGARETTES, UNCOMPL                     

 

 2017            ADONAY JAMES DO            Ot            K04.7         
   PERIAPICAL ABSCESS WITHOUT SINUS                     

 

 2017            ADONAY JAMES DO            Ot            K59.09        
    OTHER CONSTIPATION                     

 

 2017            ADONAY JAMES DO            Ot            L03.211       
     CELLULITIS OF FACE                     

 

 2017            ADONAY JAMES DO            Ot            R79.89        
    OTHER SPECIFIED ABNORMAL FINDINGS OF BLO                     

 

 2017            DAISY CARLINE MALONE            Ot            N73.6     
       FEMALE PELVIC PERITONEAL ADHESIONS (POST                     

 

 2017            DAISY CARLINE MALONE            Ot            N94.5     
       SECONDARY DYSMENORRHEA                     

 

 2017            DAISY CARLINE MALONE            Ot            R10.2     
       PELVIC AND PERINEAL PAIN                     

 

 2017            DAISY MALONECARLINE            Ot            R33.9     
       RETENTION OF URINE, UNSPECIFIED                     

 

 2017            DAISY CARLINE MALONE            Ot            Z87.42    
        PERSONAL HISTORY OF OTH DISEASES OF THE                      

 

 2017            DAISY MALONECARLINE            Ot            N73.6     
       FEMALE PELVIC PERITONEAL ADHESIONS (POST                     

 

 2017            DAISY CARLINE MALONE            Ot            N94.5     
       SECONDARY DYSMENORRHEA                     

 

 2017            DAISY CARLINE MALONE            Ot            R10.2     
       PELVIC AND PERINEAL PAIN                     

 

 2017            DAISY MALONECARLINE            Ot            R33.9     
       RETENTION OF URINE, UNSPECIFIED                     

 

 2017            DAISY CARLINE MALONE            Ot            Z87.42    
        PERSONAL HISTORY OF OTH DISEASES OF THE                      

 

 2017            DAISY CARLINE MALONE            Ot            N13.1     
       HYDRONEPHROSIS W URETERAL STRICTURE, NEC                     

 

 2017            CARLINE VILLA DO            Ot            O43.192   
         OTHER MALFORMATION OF PLACENTA, SECOND T                     

 

 2017            CARLINE VILLA DO            Ot            O99.89    
        OTH DISEASES AND CONDITIONS COMPL PREG/C                     

 

 2017            CARLINE VILLA DO            Ot            R09.89    
        OTH SYMPTOMS AND SIGNS INVOLVING THE CIR                     

 

 2017            CARLINE VILLA DO            Ot            O34.21    
        MATERNAL CARE FOR SCAR FROM PREVIOUS ROXANA                     

 

 2017            CARLINE VILLA DO            Ot            O43.192   
         OTHER MALFORMATION OF PLACENTA, SECOND T                     

 

 2017            CARLINE VILLA DO            Ot            R09.89    
        OTH SYMPTOMS AND SIGNS INVOLVING THE CIR                     

 

 2017            ADONAY JAMES DO            Ot            F17.210       
     NICOTINE DEPENDENCE, CIGARETTES, UNCOMPL                     

 

 2017            ADONAY JAMES DO            Ot            K04.7         
   PERIAPICAL ABSCESS WITHOUT SINUS                     

 

 2017            JAMES DO, ADONAY            Ot            K59.09        
    OTHER CONSTIPATION                     

 

 2017            JACOB MALONE ADONAY            Ot            L03.211       
     CELLULITIS OF FACE                     

 

 2017            JACOB MALONE ADONAY            Ot            R79.89        
    OTHER SPECIFIED ABNORMAL FINDINGS OF BLO                     

 

 06/15/2017            CARLINE VILLA DO S            Ot            N13.1     
       HYDRONEPHROSIS W URETERAL STRICTURE, NEC                     

 

 06/15/2017            FENECH DO, CARLINE S            Ot            O43.192   
         OTHER MALFORMATION OF PLACENTA, SECOND T                     

 

 06/15/2017            BHAVANIECH DO, CARLINE S            Ot            O99.89    
        OTH DISEASES AND CONDITIONS COMPL PREG/C                     

 

 06/15/2017            FENECH DO, CARLINE S            Ot            R09.89    
        OTH SYMPTOMS AND SIGNS INVOLVING THE CIR                     

 

 06/15/2017            FENECH DO, CARLINE S            Ot            O34.21    
        MATERNAL CARE FOR SCAR FROM PREVIOUS ROXANA                     

 

 06/15/2017            DAISY DO, CARLINE S            Ot            O43.192   
         OTHER MALFORMATION OF PLACENTA, SECOND T                     

 

 06/15/2017            BHAVANIECH DO, CARLINE S            Ot            R09.89    
        OTH SYMPTOMS AND SIGNS INVOLVING THE CIR                     

 

 2017            PROSPER PAVON, EDELMIRA L            Ot            M41.84    
        OTHER FORMS OF SCOLIOSIS, THORACIC REGIO                     

 

 2017            PROSPER PAVON, EDELMIRA L            Ot            M54.2     
       CERVICALGIA                     

 

 2017            PROSPER PAVON, EDELMIRA L            Ot            M54.5     
       LOW BACK PAIN                     

 

 2017            PROSPER PAVON, EDELMIRA L            Ot            M54.6     
       PAIN IN THORACIC SPINE                     

 

 2017            PROSPER PAVON, EDELMIRA L            Ot            M41.84    
        OTHER FORMS OF SCOLIOSIS, THORACIC REGIO                     

 

 2017            NI CHENG MDHEL L            Ot            M54.2     
       CERVICALGIA                     

 

 2017            PROSPER PAVON EDELMIRA L            Ot            M54.5     
       LOW BACK PAIN                     

 

 2017            NI CHENG MDHEL L            Ot            M54.6     
       PAIN IN THORACIC SPINE                     

 

 2017            BHAVANIECH DO, CARLINE S            Ot            N13.1     
       HYDRONEPHROSIS W URETERAL STRICTURE, NEC                     

 

 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   
         OTHER MALFORMATION OF PLACENTA, SECOND T                     

 

 2017            BHAVANIECH DO, CARLINE S            Ot            O99.89    
        OTH DISEASES AND CONDITIONS COMPL PREG/C                     

 

 2017            FENECH DO, CARLINE S            Ot            R09.89    
        OTH SYMPTOMS AND SIGNS INVOLVING THE CIR                     

 

 2017            FENECH DO, CARLINE S            Ot            O34.21    
        MATERNAL CARE FOR SCAR FROM PREVIOUS ROXANA                     

 

 2017            CARLINE VILLA DO            Ot            O43.192   
         OTHER MALFORMATION OF PLACENTA, SECOND T                     

 

 2017            CARLINE VILLA DO            Ot            R09.89    
        OT SYMPTOMS AND SIGNS INVOLVING THE CIR                     

 

 2017            PROSPER PAVON, EDELMIRA L            Ot            M41.84    
        OTHER FORMS OF SCOLIOSIS, THORACIC REGIO                     

 

 2017            PROSPER PAVON, EDELMIRA L            Ot            M54.2     
       CERVICALGIA                     

 

 2017            PROSPER PAVON, EDELMIRA L            Ot            M54.5     
       LOW BACK PAIN                     

 

 2017            PROSPER PAVON, EDELMIRA L            Ot            M54.6     
       PAIN IN THORACIC SPINE                     

 

 2017            EDELMIRA CHENG MD L            Ot            M54.5     
       LOW BACK PAIN                     

 

 2017            EDELMIRA CHENG MD L            Ot            R93.7     
       ABNORMAL FINDINGS ON DIAGNOSTIC IMAGING                      

 

 2017            EDELMIRA CHENG MD L            Ot            M54.5     
       LOW BACK PAIN                     

 

 2017            EDELMIRA CHENG MD L            Ot            R93.7     
       ABNORMAL FINDINGS ON DIAGNOSTIC IMAGING                      

 

 2018            EDELMIRA CHENG            W            780.79          
  OTHER MALAISE AND FATIGUE                     

 

 2018            EDELMIRA CHENG            W            787.02          
  NAUSEA ALONE                     

 

 2018            EDELMIRA CHENG            W            P96.89          
  OTHER SPECIFIED CONDITIONS ORIGINATING IN THE  PERIOD               
      

 

 2018            EDELMIRA CHENG            W            R11.0            
NAUSEA                     

 

 2018            EDELMIRA CHENG            W            780.79          
  OTHER MALAISE AND FATIGUE                     

 

 2018            EDELMIRA CHENG            W            787.02          
  NAUSEA ALONE                     

 

 2018            EDELMIRA CHENG            W            P96.89          
  OTHER SPECIFIED CONDITIONS ORIGINATING IN THE  PERIOD               
      

 

 2018            EDELMIRA CHENG            W            R11.0            
NAUSEA                     

 

 2018            EDELMIRA CHENG            W            780.79          
  OTHER MALAISE AND FATIGUE                     

 

 2018            EDELMIRA CHENG            W            787.02          
  NAUSEA ALONE                     

 

 2018            EDELMIRA CHENG            W            P96.89          
  OTHER SPECIFIED CONDITIONS ORIGINATING IN THE  PERIOD               
      

 

 2018            EDELMIRA CHENG            W            R11.0            
NAUSEA                     

 

 2018            EDELMIRA CHENG            W            R53.83          
  OTHER FATIGUE                     

 

 01/10/2018                         W            780.79            OTHER 
MALAISE AND FATIGUE                     

 

 01/10/2018                         W            R53.83            OTHER 
FATIGUE                     

 

 01/10/2018                         W            780.79            OTHER 
MALAISE AND FATIGUE                     

 

 01/10/2018                         W            R53.83            OTHER 
FATIGUE                     

 

 01/10/2018                         W            780.79            OTHER 
MALAISE AND FATIGUE                     

 

 01/10/2018                         W            R53.83            OTHER 
FATIGUE                     



                                                                               
                                                                               
                                                                               
                                                                               
                                                                               
                                                                               
                                                                               
                                                                               
                                                                               
                                                                               
                                                            



Procedures

      





 Code            Description            Performed By            Performed On   
     

 

             86907                                  INDIV PSYTX 45/50 MIN      
                             2012        

 

             21963                                  UA W/ CULTURE IF INDICATED 
                                  2013        

 

             00908                                  UA W/MICROSCOPY            
                       2013        

 

             23971                                  UA LONG DIP                
                   2013        

 

             91330                                  XRAY ABDOMEN 2 VIEWS       
                            2013        

 

             88011                                  ROUTINE VENIPUNCTURE       
                            2013        

 

             98210                                  UA W/ CULTURE IF INDICATED 
                                  2013        

 

             54821                                  URINE PREGNANCY TEST (IN-
HOUSE)                                   2013        

 

             49663                                  TSH                        
           2013        

 

             72084                                  A1C (IN-HOUSE)             
                      2013        

 

             89218                                  UA W/ CULTURE IF INDICATED 
                                  2013        

 

             87405                                  UA W/ CULTURE IF INDICATED 
                                  10/10/2013        

 

             02674                                  CULTURE URINE              
                     10/10/2013        

 

             OBSTETRIC                                  SHAQUILLE BILLA           
                        2013        

 

             75658                                  URINE PREGNANCY TEST (IN-
HOUSE)                                   2013        

 

             [id number]                                  EXTRACTION OF POC, LOW 
CERVICAL, OPEN AP                                   2016        



                                                



Results

      





 Test            Result            Range        









 Complete urinalysis with reflex to culture - 10/05/16 19:40         









 Urine color determination            YELLOW             NRG        

 

 Urine clarity determination            SLIGHTLY CLOUDY             NRG        

 

 Urine pH measurement by test strip            7             5-9        

 

 Specific gravity of urine by test strip            1.015             1.016-
1.022        

 

 Urine protein assay by test strip, semi-quantitative            NEGATIVE      
       NEGATIVE        

 

 Urine glucose detection by automated test strip            NEGATIVE           
  NEGATIVE        

 

 Erythrocytes detection in urine sediment by light microscopy            
NEGATIVE             NEGATIVE        

 

 Urine ketones detection by automated test strip            NEGATIVE           
  NEGATIVE        

 

 Urine nitrite detection by test strip            NEGATIVE             NEGATIVE
        

 

 Urine total bilirubin detection by test strip            NEGATIVE             
NEGATIVE        

 

 Urine urobilinogen measurement by automated test strip (mass/volume)          
  4 mg/dL            NORMAL        

 

 Urine leukocyte esterase detection by dipstick            2+             
NEGATIVE        

 

 Automated urine sediment erythrocyte count by microscopy (number/high power 
field)            NONE             NRG        

 

 Automated urine sediment leukocyte count by microscopy (number/high power field
)             [HPF]            NRG        

 

 Bacteria detection in urine sediment by light microscopy            TRACE     
        NRG        

 

 Squamous epithelial cells detection in urine sediment by light microscopy     
       10-25             NRG        

 

 Crystals detection in urine sediment by light microscopy            NONE      
       NRG        

 

 Casts detection in urine sediment by light microscopy            NONE         
    NRG        

 

 Mucus detection in urine sediment by light microscopy            NEGATIVE     
        NRG        

 

 Complete urinalysis with reflex to culture            YES             NRG     
   









 Bacterial urine culture - 10/05/16 19:40         









 Bacterial urine culture            [id number]             NRG        

 

 COLONY COUNT            >100,000/ML             NRG        

 

 FTX;REPORTABLE            PLUS MIXED GRAM POSITIVES             NRG        

 

 FREE TEXT ENTRY 2            <10,000/ML             NRG        









 Complete urinalysis with reflex to culture - 10/24/16 16:00         









 Urine color determination            YELLOW             NRG        

 

 Urine clarity determination            SLIGHTLY CLOUDY             NRG        

 

 Urine pH measurement by test strip            6             5-9        

 

 Specific gravity of urine by test strip            1.025             1.016-
1.022        

 

 Urine protein assay by test strip, semi-quantitative            2+             
NEGATIVE        

 

 Urine glucose detection by automated test strip            NEGATIVE           
  NEGATIVE        

 

 Erythrocytes detection in urine sediment by light microscopy            
NEGATIVE             NEGATIVE        

 

 Urine ketones detection by automated test strip            4+             
NEGATIVE        

 

 Urine nitrite detection by test strip            NEGATIVE             NEGATIVE
        

 

 Urine total bilirubin detection by test strip            NEGATIVE             
NEGATIVE        

 

 Urine urobilinogen measurement by automated test strip (mass/volume)          
  4 mg/dL            NORMAL        

 

 Urine leukocyte esterase detection by dipstick            2+             
NEGATIVE        

 

 Automated urine sediment erythrocyte count by microscopy (number/high power 
field)            NONE             NRG        

 

 Automated urine sediment leukocyte count by microscopy (number/high power field
)             [HPF]            NRG        

 

 Bacteria detection in urine sediment by light microscopy            LARGE     
        NRG        

 

 Squamous epithelial cells detection in urine sediment by light microscopy     
       25-50             NRG        

 

 Crystals detection in urine sediment by light microscopy            NONE      
       NRG        

 

 Casts detection in urine sediment by light microscopy            NONE         
    NRG        

 

 Mucus detection in urine sediment by light microscopy            LARGE        
     NRG        

 

 Complete urinalysis with reflex to culture            YES             NRG     
   









 Urine drug screening test - 10/24/16 16:00         









 Urine phencyclidine detection by screening method            NEGATIVE         
    NEGATIVE        

 

 Urine benzodiazepines detection by screening method            NEGATIVE       
      NEGATIVE        

 

 Urine cocaine detection            NEGATIVE             NEGATIVE        

 

 Urine amphetamines detection by screening method            NEGATIVE          
   NEGATIVE        

 

 Urine methamphetamine detection by screening method            NEGATIVE       
      NEGATIVE        

 

 Urine cannabinoids detection by screening method            NEGATIVE          
   NEGATIVE        

 

 Urine opiates detection by screening method            NEGATIVE             
NEGATIVE        

 

 Urine barbiturates detection            POSITIVE             NEGATIVE        

 

 Screening urine tricyclic antidepressants detection            NEGATIVE       
      NEGATIVE        

 

 Urine methadone detection by screening method            NEGATIVE             
NEGATIVE        

 

 Urine oxycodone detection            NEGATIVE             NEGATIVE        

 

 Urine propoxyphene detection            NEGATIVE             NEGATIVE        

 

 Urine buprenophrine screen            NEGATIVE             NEGATIVE        









 Bacterial urine culture - 10/24/16 16:00         









 URINE CULTURE RESULTS            10,000/ML - 100,000/ML             NRG        









 Complete blood count (CBC) with automated white blood cell (WBC) differential 
- 11/03/16 15:55         









 Blood leukocytes automated count (number/volume)            11.7 10*3/uL      
      4.3-11.0        

 

 Blood erythrocytes automated count (number/volume)            3.87 10*6/uL    
        4.35-5.85        

 

 Venous blood hemoglobin measurement (mass/volume)            11.2 g/dL        
    11.5-16.0        

 

 Blood hematocrit (volume fraction)            33 %            35-52        

 

 Automated erythrocyte mean corpuscular volume            85 [foz_us]          
  80-99        

 

 Automated erythrocyte mean corpuscular hemoglobin (mass per erythrocyte)      
      29 pg            25-34        

 

 Automated erythrocyte mean corpuscular hemoglobin concentration measurement (
mass/volume)            34 g/dL            32-36        

 

 Automated erythrocyte distribution width ratio            12.3 %            
10.0-14.5        

 

 Automated blood platelet count (count/volume)            314 10*3/uL          
  130-400        

 

 Automated blood platelet mean volume measurement            9.9 [foz_us]      
      7.4-10.4        

 

 Automated blood neutrophils/100 leukocytes            74 %            42-75   
     

 

 Automated blood lymphocytes/100 leukocytes            18 %            12-44   
     

 

 Blood monocytes/100 leukocytes            7 %            0-12        

 

 Automated blood eosinophils/100 leukocytes            1 %            0-10     
   

 

 Automated blood basophils/100 leukocytes            0 %            0-10        

 

 Blood neutrophils automated count (number/volume)            8.7 10*3         
   1.8-7.8        

 

 Blood lymphocytes automated count (number/volume)            2.2 10*3         
   1.0-4.0        

 

 Blood monocytes automated count (number/volume)            0.8 10*3            
0.0-1.0        

 

 Automated eosinophil count            0.1 10*3/uL            0.0-0.3        

 

 Automated blood basophil count (count/volume)            0.0 10*3/uL          
  0.0-0.1        









 Blood type T Indirect antibody screen panel - 16 15:55         









 ABO+Rh group            AN             NRG        

 

 Transfusion band number            [id number]             NRG        

 

 Blood group antibody screen            NEGATIVE             NRG        









 Complete blood count (CBC) with automated white blood cell (WBC) differential 
- 16 05:45         









 Blood leukocytes automated count (number/volume)            12.6 10*3/uL      
      4.3-11.0        

 

 Blood erythrocytes automated count (number/volume)            3.55 10*6/uL    
        4.35-5.85        

 

 Venous blood hemoglobin measurement (mass/volume)            10.2 g/dL        
    11.5-16.0        

 

 Blood hematocrit (volume fraction)            30 %            35-52        

 

 Automated erythrocyte mean corpuscular volume            85 [foz_us]          
  80-99        

 

 Automated erythrocyte mean corpuscular hemoglobin (mass per erythrocyte)      
      29 pg            25-34        

 

 Automated erythrocyte mean corpuscular hemoglobin concentration measurement (
mass/volume)            34 g/dL            32-36        

 

 Automated erythrocyte distribution width ratio            12.4 %            
10.0-14.5        

 

 Automated blood platelet count (count/volume)            276 10*3/uL          
  130-400        

 

 Automated blood platelet mean volume measurement            9.9 [foz_us]      
      7.4-10.4        

 

 Automated blood neutrophils/100 leukocytes            76 %            42-75   
     

 

 Automated blood lymphocytes/100 leukocytes            17 %            12-44   
     

 

 Blood monocytes/100 leukocytes            7 %            0-12        

 

 Automated blood eosinophils/100 leukocytes            1 %            0-10     
   

 

 Automated blood basophils/100 leukocytes            0 %            0-10        

 

 Blood neutrophils automated count (number/volume)            9.5 10*3         
   1.8-7.8        

 

 Blood lymphocytes automated count (number/volume)            2.1 10*3         
   1.0-4.0        

 

 Blood monocytes automated count (number/volume)            0.9 10*3            
0.0-1.0        

 

 Automated eosinophil count            0.1 10*3/uL            0.0-0.3        

 

 Automated blood basophil count (count/volume)            0.0 10*3/uL          
  0.0-0.1        









 RH IMMUNE GLOBULIN [facility] - 16 05:45         









 RH IMMUNE GLOBULIN [facility]                           PRSMD [id number]    16 
[id number]               [facility]        









 Fetal cell screen - 16 05:45         









 FETAL SCREEN LOT NUMBER            [id number]             [facility]        

 

 Transfusion band number            [id number]             NRG        

 

 [id number]            1 300ug            NRG        

 

 Erythrocytes.fetal/1000 erythrocytes            16             [facility]      
  

 

 Fetal cell screen            18             [facility]        

 

 Fetal cell screen            NEGATIVE             NEGATIVE        

 

 Lot number            [id number]             NRG        









 Complete blood count (CBC) with automated white blood cell (WBC) differential 
- 17 10:35         









 Blood leukocytes automated count (number/volume)            6.6 10*3/uL       
     4.3-11.0        

 

 Blood erythrocytes automated count (number/volume)            4.68 10*6/uL    
        4.35-5.85        

 

 Venous blood hemoglobin measurement (mass/volume)            13.4 g/dL        
    11.5-16.0        

 

 Blood hematocrit (volume fraction)            39 %            35-52        

 

 Automated erythrocyte mean corpuscular volume            84 [foz_us]          
  80-99        

 

 Automated erythrocyte mean corpuscular hemoglobin (mass per erythrocyte)      
      29 pg            25-34        

 

 Automated erythrocyte mean corpuscular hemoglobin concentration measurement (
mass/volume)            34 g/dL            32-36        

 

 Automated erythrocyte distribution width ratio            13.1 %            
10.0-14.5        

 

 Automated blood platelet count (count/volume)            340 10*3/uL          
  130-400        

 

 Automated blood platelet mean volume measurement            9.7 [foz_us]      
      7.4-10.4        

 

 Automated blood neutrophils/100 leukocytes            63 %            42-75   
     

 

 Automated blood lymphocytes/100 leukocytes            30 %            12-44   
     

 

 Blood monocytes/100 leukocytes            6 %            0-12        

 

 Automated blood eosinophils/100 leukocytes            2 %            0-10     
   

 

 Automated blood basophils/100 leukocytes            0 %            0-10        

 

 Blood neutrophils automated count (number/volume)            4.2 10*3         
   1.8-7.8        

 

 Blood lymphocytes automated count (number/volume)            2.0 10*3         
   1.0-4.0        

 

 Blood monocytes automated count (number/volume)            0.4 10*3            
0.0-1.0        

 

 Automated eosinophil count            0.1 10*3/uL            0.0-0.3        

 

 Automated blood basophil count (count/volume)            0.0 10*3/uL          
  0.0-0.1        









 Blood type T Indirect antibody screen panel - 17 10:35         









 ABO+Rh group            AN             NRG        

 

 Transfusion band number            [id number]             NRG        

 

 Blood group antibody screen            NEGATIVE             NRG        









 Methicillin resistant Staphylococcus aureus (MRSA) screening culture -  10:35         









 Methicillin resistant Staphylococcus aureus (MRSA) screening culture          
  NEG             NRG        









 Urine beta human chorionic gonadotropin (hCG) measurement - 17 06:18    
     









 Urine beta human chorionic gonadotropin (hCG) measurement            NEGATIVE 
            NEGATIVE        









 Blood type T Indirect antibody screen panel - 17 06:25         









 ABO+Rh group            AN             NRG        

 

 Transfusion band number            [id number]             NRG        

 

 Blood group antibody screen            NEGATIVE             NRG        









 Complete blood count (CBC) with automated white blood cell (WBC) differential 
- 17 10:45         









 Blood leukocytes automated count (number/volume)            8.6 10*3/uL       
     4.3-11.0        

 

 Blood erythrocytes automated count (number/volume)            4.23 10*6/uL    
        4.35-5.85        

 

 Venous blood hemoglobin measurement (mass/volume)            12.1 g/dL        
    11.5-16.0        

 

 Blood hematocrit (volume fraction)            37 %            35-52        

 

 Automated erythrocyte mean corpuscular volume            87 [foz_us]          
  80-99        

 

 Automated erythrocyte mean corpuscular hemoglobin (mass per erythrocyte)      
      29 pg            25-34        

 

 Automated erythrocyte mean corpuscular hemoglobin concentration measurement (
mass/volume)            33 g/dL            32-36        

 

 Automated erythrocyte distribution width ratio            13.3 %            
10.0-14.5        

 

 Automated blood platelet count (count/volume)            270 10*3/uL          
  130-400        

 

 Automated blood platelet mean volume measurement            9.1 [foz_us]      
      7.4-10.4        

 

 Automated blood neutrophils/100 leukocytes            70 %            42-75   
     

 

 Automated blood lymphocytes/100 leukocytes            21 %            12-44   
     

 

 Blood monocytes/100 leukocytes            7 %            0-12        

 

 Automated blood eosinophils/100 leukocytes            3 %            0-10     
   

 

 Automated blood basophils/100 leukocytes            0 %            0-10        

 

 Blood neutrophils automated count (number/volume)            6.0 10*3         
   1.8-7.8        

 

 Blood lymphocytes automated count (number/volume)            1.8 10*3         
   1.0-4.0        

 

 Blood monocytes automated count (number/volume)            0.6 10*3            
0.0-1.0        

 

 Automated eosinophil count            0.2 10*3/uL            0.0-0.3        

 

 Automated blood basophil count (count/volume)            0.0 10*3/uL          
  0.0-0.1        









 Comprehensive metabolic panel - 17 10:45         









 Serum or plasma sodium measurement (moles/volume)            138 mmol/L       
     135-145        

 

 Serum or plasma potassium measurement (moles/volume)            4.0 mmol/L    
        3.6-5.0        

 

 Serum or plasma chloride measurement (moles/volume)            106 mmol/L     
               

 

 Carbon dioxide            24 mmol/L            21-32        

 

 Serum or plasma anion gap determination (moles/volume)            8 mmol/L    
        5-14        

 

 Serum or plasma urea nitrogen measurement (mass/volume)            5 mg/dL    
        7-18        

 

 Serum or plasma creatinine measurement (mass/volume)            0.75 mg/dL    
        0.60-1.30        

 

 Serum or plasma urea nitrogen/creatinine mass ratio            7             
NRG        

 

 Serum or plasma creatinine measurement with calculation of estimated 
glomerular filtration rate            >             NRG        

 

 Serum or plasma glucose measurement (mass/volume)            84 mg/dL         
           

 

 Serum or plasma calcium measurement (mass/volume)            8.4 mg/dL        
    8.5-10.1        

 

 Serum or plasma total bilirubin measurement (mass/volume)            0.4 mg/dL
            0.1-1.0        

 

 Serum or plasma alkaline phosphatase measurement (enzymatic activity/volume)  
          67 U/L                    

 

 Serum or plasma aspartate aminotransferase measurement (enzymatic activity/
volume)            44 U/L            5-34        

 

 Serum or plasma alanine aminotransferase measurement (enzymatic activity/volume
)            76 U/L            0-55        

 

 Serum or plasma protein measurement (mass/volume)            6.4 g/dL         
   6.4-8.2        

 

 Serum or plasma albumin measurement (mass/volume)            3.5 g/dL         
   3.2-4.5        









 Complete blood count (CBC) with automated white blood cell (WBC) differential 
- 17 05:08         









 Blood leukocytes automated count (number/volume)            6.6 10*3/uL       
     4.3-11.0        

 

 Blood erythrocytes automated count (number/volume)            3.99 10*6/uL    
        4.35-5.85        

 

 Venous blood hemoglobin measurement (mass/volume)            11.5 g/dL        
    11.5-16.0        

 

 Blood hematocrit (volume fraction)            35 %            35-52        

 

 Automated erythrocyte mean corpuscular volume            87 [foz_us]          
  80-99        

 

 Automated erythrocyte mean corpuscular hemoglobin (mass per erythrocyte)      
      29 pg            25-34        

 

 Automated erythrocyte mean corpuscular hemoglobin concentration measurement (
mass/volume)            33 g/dL            32-36        

 

 Automated erythrocyte distribution width ratio            13.1 %            
10.0-14.5        

 

 Automated blood platelet count (count/volume)            279 10*3/uL          
  130-400        

 

 Automated blood platelet mean volume measurement            9.8 [foz_us]      
      7.4-10.4        

 

 Automated blood neutrophils/100 leukocytes            62 %            42-75   
     

 

 Automated blood lymphocytes/100 leukocytes            29 %            12-44   
     

 

 Blood monocytes/100 leukocytes            6 %            0-12        

 

 Automated blood eosinophils/100 leukocytes            3 %            0-10     
   

 

 Automated blood basophils/100 leukocytes            0 %            0-10        

 

 Blood neutrophils automated count (number/volume)            4.1 10*3         
   1.8-7.8        

 

 Blood lymphocytes automated count (number/volume)            1.9 10*3         
   1.0-4.0        

 

 Blood monocytes automated count (number/volume)            0.4 10*3            
0.0-1.0        

 

 Automated eosinophil count            0.2 10*3/uL            0.0-0.3        

 

 Automated blood basophil count (count/volume)            0.0 10*3/uL          
  0.0-0.1        









 Comprehensive metabolic panel - 17 05:08         









 Serum or plasma sodium measurement (moles/volume)            138 mmol/L       
     135-145        

 

 Serum or plasma potassium measurement (moles/volume)            4.3 mmol/L    
        3.6-5.0        

 

 Serum or plasma chloride measurement (moles/volume)            105 mmol/L     
               

 

 Carbon dioxide            26 mmol/L            21-32        

 

 Serum or plasma anion gap determination (moles/volume)            7 mmol/L    
        5-14        

 

 Serum or plasma urea nitrogen measurement (mass/volume)            5 mg/dL    
        7-18        

 

 Serum or plasma creatinine measurement (mass/volume)            0.70 mg/dL    
        0.60-1.30        

 

 Serum or plasma urea nitrogen/creatinine mass ratio            7             
NRG        

 

 Serum or plasma creatinine measurement with calculation of estimated 
glomerular filtration rate            >             NRG        

 

 Serum or plasma glucose measurement (mass/volume)            83 mg/dL         
           

 

 Serum or plasma calcium measurement (mass/volume)            8.6 mg/dL        
    8.5-10.1        

 

 Serum or plasma total bilirubin measurement (mass/volume)            0.2 mg/dL
            0.1-1.0        

 

 Serum or plasma alkaline phosphatase measurement (enzymatic activity/volume)  
          77 U/L                    

 

 Serum or plasma aspartate aminotransferase measurement (enzymatic activity/
volume)            45 U/L            5-34        

 

 Serum or plasma alanine aminotransferase measurement (enzymatic activity/volume
)            61 U/L            0-55        

 

 Serum or plasma protein measurement (mass/volume)            6.2 g/dL         
   6.4-8.2        

 

 Serum or plasma albumin measurement (mass/volume)            3.2 g/dL         
   3.2-4.5        









 Acute hepatitis panel - 17 05:08         









 Confirmatory quantitative serum or plasma hepatitis B virus surface antigen 
measurement            Non-Reactive             Non-Reactive        

 

 Hepatitis A virus IgM antibody assay            Non-Reactive             Non-
Reactive        

 

 Hepatitis B virus core IgM antibody assay            Non-Reactive             
Non-Reactive        

 

 Serum hepatitis C virus antibody detection            Non-Reactive             
Non-Reactive        









 Thyroid Stimulating Hormone - 18 13:16         









 TSH            5.20 mIU/mL            0.32-5.00        



                                                        



Encounters

      





 ACCT No.            Visit Date/Time            Discharge            Status    
        Pt. Type            Provider            Facility            Loc./Unit  
          Complaint        

 

 [id number]            2013 15:28:00            2013 23:59:59          
  [facility]            Outpatient            ESTIVEN MOSER DO                        
                       

 

 [id number]            2013 08:32:00            2013 23:59:59          
  [facility]            Outpatient            ESTIVEN MOSER DO                        
                       

 

 [id number]            2013 15:18:00            2013 23:59:59          
  [facility]            Outpatient            ESTELITA ABHIJEETANDREW ZACHARY                    
                           

 

 [id number]            10/10/2013 13:29:00            10/10/2013 23:59:59          
  [facility]            Outpatient            FRANCI PEACEEVANGELINA CROSS               
                                

 

 [id number]            2013 10:18:00            2013 23:59:59          
  [facility]            Outpatient            AGA JOHNSON MD                       
                        

 

 [id number]            2013 15:17:00            2013 23:59:59          
  [facility]            Outpatient            TYSON PARMARELIBEVERLYTRISTA R               
                                

 

 [id number]            2012 14:55:00            2012 23:59:59          
  [facility]            Outpatient            YAS DIAZ PSYD                
                               

 

 [id number]            10/24/2012 15:07:00            10/24/2012 23:59:59            
[facility]            Outpatient            YAS DIAZ PSYD                  
                             

 

 [id number]            2013 10:28:00                                      
Document Registration                                                          
  

 

 [id number]            2013 13:27:00                                      
Document Registration                                                          
  

 

 [id number]            2013 14:33:00                                      
Document Registration                                                          
  

 

 [id number]            2017 09:05:00            2017 23:59:59    
        [facility]            Outpatient            EDELMIRA CHENG MD            
[facility]            RAD            LUMBAR PAIN W/
RADICULOPATHY        

 

 [id number]            06/15/2017 14:23:00            06/15/2017 23:59:59    
        CL[facility]            Outpatient            EDELMIRA CHENG MD            
[facility]            RAD            CERVICAL THORACIC 
AND LUMBAR PAIN        

 

 [id number]            2017 09:00:00            2017 23:59:59    
        [facility]            Preadmit            ADONAY JAMES DO            [facility] 
[facility]            RAD            R10.11        

 

 [id number]            2017 13:29:00            2017 08:33:00    
        DIS            Inpatient            ADONAY JAMES DO            [facility]            4TH            CELLULITIS FACE/DENTAL 
ABSCESS        

 

 [id number]            2017 06:07:00            2017 13:48:00    
        DIS            Outpatient            CARLINE VILLA DO            
[facility]            SDC            CPP;ENDOMETRIOSIS   
     

 

 [id number]            2017 10:05:00            2017 11:46:00    
        DIS            Outpatient            CARLINE VILLA DO            
[facility]            PREOP            CPP;ENDOMETRIOSIS 
       

 

 [id number]            2016 15:41:00            2016 13:10:00    
        DIS            Inpatient            CARLINE VILLA DO            [facility]            LDRP            REPEAT         

 

 [id number]            10/24/2016 15:17:00            10/24/2016 17:30:00    
        DIS            Outpatient            BETSY TOVAR MD         
   [facility]            WSo            CONTRACTIONS     
   

 

 [id number]            10/05/2016 19:26:00            10/05/2016 20:55:00    
        DIS            Outpatient            JUSTIN PAVON, ANNA BENITEZ            [facility] 
[facility]            CONTRACTIONS        

 

 [id number]            2016 08:15:00            2016 23:59:59    
        CLS            Outpatient            CARLINE VILLA DO            
[facility]            RAD            ABDOMINAL PAIN IN 
PREGNANCY        

 

 [id number]            2016 13:32:00            2016 23:59:59    
        CLS            Outpatient            CARLINE VILLA DO            
[facility]            RAD            ABDOMINAL PAIN, 
PALPABLE ABDOMINAL AORTA        

 

 [id number]            04/10/2016 22:37:00            2016 00:58:00    
        DIS            Emergency            AZIZA SNELL            
[facility]            ER            ABD PAIN/BLEEDING 7 
WEEKS PREGNANT        

 

 [id number]            2016 09:04:00            2016 23:59:59    
        CL[facility]            Outpatient            KELLIE ADAMS            [facility]            OCC                     

 

 [id number]            2016 11:19:00            2016 15:04:00    
        DIS            Emergency            AZIZA SNELL            
[facility]            ER            ABD/BACK PAIN;
POSSIBLY PREGNANT        

 

 [id number]            2014 20:30:00            2014 14:20:00    
        DIS            Inpatient            CARLINE VILLA DO            [facility]            LDRP                     

 

 [id number]            2014 22:12:00            2014 00:40:00    
        DIS            Outpatient            CARLINE VILLA DO            
[facility]            WSo                     

 

 [id number]            2014 13:52:00            2014 14:35:00    
        DIS            Outpatient            CARLINE VILLA DO S            
[facility]                     

 

 [id number]            2014 19:09:00            2014 22:05:00    
        DIS            Outpatient            CARLINE VILLA DO S            
[facility]                     

 

 [id number]            2014 14:48:00            2014 16:35:00    
        DIS            Outpatient            CARLINE VILLA DO S            
[facility]                     

 

 [id number]            2014 15:40:00            2014 18:30:00    
        DIS            Inpatient            CARLINE VILLA DO S            [facility]        

 

 [id number]            2013 01:11:00            2013 03:23:00    
        DIS            Emergency            JACKELIN BYRD DO            [facility] 
[facility]            ER                     

 

 [id number]            10/14/2013 23:50:00            10/16/2013 13:40:00    
        DIS            Inpatient                                               
             

 

 [id number]            2013 19:33:00            2013 23:59:59    
        [facility]            Outpatient                                              
              

 

 [id number]            2016 16:20:00                                   
   Document Registration                                                       
     

 

 [id number]            2016 11:18:00                                   
   Document Registration                                                       
     

 

 [id number]            2013 20:51:00                                   
   Document Registration                                                       
     

 

 [id number]            2012 18:40:00                                   
   Document Registration                                                       
     

 

 [id number]            2012 17:48:00                                   
   Document Registration                                                       
     

 

 [id number]            2011 11:04:00                                   
   Document Registration                                                       
     

 

 [id number]            2011 19:14:00                                   
   Document Registration                                                       
     

 

 [id number]            2018 13:15:00            2018 23:59:00          
  DIS            Outpatient            EDELMIRA CHENG                         
                      

 

 [id number]            01/10/2018 06:33:04                                      
Document Registration

## 2018-05-07 ENCOUNTER — HOSPITAL ENCOUNTER (OUTPATIENT)
Dept: HOSPITAL 75 - CARD | Age: 28
End: 2018-05-07
Attending: NURSE PRACTITIONER
Payer: MEDICAID

## 2018-05-07 DIAGNOSIS — R10.11: Primary | ICD-10-CM

## 2018-05-07 PROCEDURE — 78227 HEPATOBIL SYST IMAGE W/DRUG: CPT

## 2018-05-07 NOTE — DIAGNOSTIC IMAGING REPORT
INDICATION: Right upper quadrant pain.



COMPARISON:  Right upper quadrant ultrasound 04/26/2018. Nuclear

medicine hepatobiliary scan from 04/07/2012



TECHNIQUE: Anterior scintigraphic imaging of the abdomen was

performed after the intravenous administration of 5.22 mCi Tc-99m

Choletec.



FINDINGS:  The upper abdomen was imaged for 60 minutes with the

gamma camera. There is prompt homogeneous uptake of

radiopharmaceutical by the liver. There is activity in the common

duct and gallbladder by 15 minutes. Small bowel activity is seen

by 70 minutes.



After 60 minutes, the patient 8 ounces of ensure by mouth.

Imaging was then performed for an additional 60 minutes, and the

gallbladder ejection fraction was determined to be 81%.



IMPRESSION:

1. Patent common and cystic bile ducts.

2. Normal gallbladder function with ejection fraction of 81%.



Dictated by: 



  Dictated on workstation # [id number]

## 2019-06-03 ENCOUNTER — HOSPITAL ENCOUNTER (OUTPATIENT)
Dept: HOSPITAL 75 - ER | Age: 29
Setting detail: OBSERVATION
LOS: 1 days | Discharge: LEFT BEFORE DISCHARGE | End: 2019-06-04
Attending: INTERNAL MEDICINE | Admitting: INTERNAL MEDICINE
Payer: MEDICAID

## 2019-06-03 ENCOUNTER — HOSPITAL ENCOUNTER (EMERGENCY)
Dept: HOSPITAL 75 - ER | Age: 29
Discharge: LEFT BEFORE DISCHARGE | End: 2019-06-03
Payer: MEDICAID

## 2019-06-03 VITALS — HEIGHT: 65 IN | WEIGHT: 177.4 LBS | BODY MASS INDEX: 29.56 KG/M2

## 2019-06-03 DIAGNOSIS — Z87.891: ICD-10-CM

## 2019-06-03 DIAGNOSIS — Z86.73: ICD-10-CM

## 2019-06-03 DIAGNOSIS — T74.21XA: Primary | ICD-10-CM

## 2019-06-03 DIAGNOSIS — F32.9: ICD-10-CM

## 2019-06-03 DIAGNOSIS — R40.2142: ICD-10-CM

## 2019-06-03 DIAGNOSIS — Z90.710: ICD-10-CM

## 2019-06-03 DIAGNOSIS — R45.1: ICD-10-CM

## 2019-06-03 DIAGNOSIS — F41.9: ICD-10-CM

## 2019-06-03 DIAGNOSIS — R40.2362: ICD-10-CM

## 2019-06-03 DIAGNOSIS — F15.10: Primary | ICD-10-CM

## 2019-06-03 DIAGNOSIS — R41.82: ICD-10-CM

## 2019-06-03 DIAGNOSIS — F13.90: ICD-10-CM

## 2019-06-03 DIAGNOSIS — Z79.899: ICD-10-CM

## 2019-06-03 DIAGNOSIS — R25.8: ICD-10-CM

## 2019-06-03 DIAGNOSIS — Z82.49: ICD-10-CM

## 2019-06-03 DIAGNOSIS — Z98.890: ICD-10-CM

## 2019-06-03 DIAGNOSIS — Z87.448: ICD-10-CM

## 2019-06-03 DIAGNOSIS — R40.2252: ICD-10-CM

## 2019-06-03 DIAGNOSIS — F10.10: ICD-10-CM

## 2019-06-03 DIAGNOSIS — J45.909: ICD-10-CM

## 2019-06-03 DIAGNOSIS — R53.83: ICD-10-CM

## 2019-06-03 DIAGNOSIS — G43.909: ICD-10-CM

## 2019-06-03 LAB
ALBUMIN SERPL-MCNC: 4.2 GM/DL (ref 3.2–4.5)
ALP SERPL-CCNC: 69 U/L (ref 40–136)
ALT SERPL-CCNC: 15 U/L (ref 0–55)
APAP SERPL-MCNC: < 10 UG/ML (ref 10–30)
APTT PPP: (no result) S
BACTERIA #/AREA URNS HPF: (no result) /HPF
BARBITURATES UR QL: POSITIVE
BASOPHILS # BLD AUTO: 0 10^3/UL (ref 0–0.1)
BASOPHILS NFR BLD AUTO: 0 % (ref 0–10)
BENZODIAZ UR QL SCN: NEGATIVE
BILIRUB SERPL-MCNC: 1.4 MG/DL (ref 0.1–1)
BILIRUB UR QL STRIP: NEGATIVE
BUN/CREAT SERPL: 18
CALCIUM SERPL-MCNC: 9.7 MG/DL (ref 8.5–10.1)
CHLORIDE SERPL-SCNC: 107 MMOL/L (ref 98–107)
CO2 SERPL-SCNC: 19 MMOL/L (ref 21–32)
COCAINE UR QL: NEGATIVE
CREAT SERPL-MCNC: 1.06 MG/DL (ref 0.6–1.3)
EOSINOPHIL # BLD AUTO: 0.1 10^3/UL (ref 0–0.3)
EOSINOPHIL NFR BLD AUTO: 1 % (ref 0–10)
ERYTHROCYTE [DISTWIDTH] IN BLOOD BY AUTOMATED COUNT: 12.3 % (ref 10–14.5)
FIBRINOGEN PPP-MCNC: CLEAR MG/DL
GFR SERPLBLD BASED ON 1.73 SQ M-ARVRAT: > 60 ML/MIN
GLUCOSE SERPL-MCNC: 87 MG/DL (ref 70–105)
GLUCOSE UR STRIP-MCNC: NEGATIVE MG/DL
HCT VFR BLD CALC: 39 % (ref 35–52)
HGB BLD-MCNC: 13.6 G/DL (ref 11.5–16)
KETONES UR QL STRIP: (no result)
LEUKOCYTE ESTERASE UR QL STRIP: (no result)
LYMPHOCYTES # BLD AUTO: 3.1 X 10^3 (ref 1–4)
LYMPHOCYTES NFR BLD AUTO: 25 % (ref 12–44)
MANUAL DIFFERENTIAL PERFORMED BLD QL: NO
MCH RBC QN AUTO: 30 PG (ref 25–34)
MCHC RBC AUTO-ENTMCNC: 35 G/DL (ref 32–36)
MCV RBC AUTO: 85 FL (ref 80–99)
METHADONE UR QL SCN: NEGATIVE
METHAMPHETAMINE SCREEN URINE S: POSITIVE
MONOCYTES # BLD AUTO: 1.1 X 10^3 (ref 0–1)
MONOCYTES NFR BLD AUTO: 9 % (ref 0–12)
NEUTROPHILS # BLD AUTO: 8.1 X 10^3 (ref 1.8–7.8)
NEUTROPHILS NFR BLD AUTO: 65 % (ref 42–75)
NITRITE UR QL STRIP: NEGATIVE
OPIATES UR QL SCN: NEGATIVE
OXYCODONE UR QL: NEGATIVE
PH UR STRIP: 6 [PH] (ref 5–9)
PLATELET # BLD: 335 10^3/UL (ref 130–400)
PMV BLD AUTO: 9.2 FL (ref 7.4–10.4)
POTASSIUM SERPL-SCNC: 3.4 MMOL/L (ref 3.6–5)
PROPOXYPH UR QL: NEGATIVE
PROT SERPL-MCNC: 7.8 GM/DL (ref 6.4–8.2)
PROT UR QL STRIP: (no result)
RBC #/AREA URNS HPF: (no result) /HPF
SALICYLATES SERPL-MCNC: < 5 MG/DL (ref 5–20)
SODIUM SERPL-SCNC: 139 MMOL/L (ref 135–145)
SP GR UR STRIP: 1.02 (ref 1.02–1.02)
SQUAMOUS #/AREA URNS HPF: (no result) /HPF
TRICYCLICS UR QL SCN: NEGATIVE
UROBILINOGEN UR-MCNC: NORMAL MG/DL
WBC # BLD AUTO: 12.4 10^3/UL (ref 4.3–11)
WBC #/AREA URNS HPF: (no result) /HPF

## 2019-06-03 PROCEDURE — 87077 CULTURE AEROBIC IDENTIFY: CPT

## 2019-06-03 PROCEDURE — 85025 COMPLETE CBC W/AUTO DIFF WBC: CPT

## 2019-06-03 PROCEDURE — 93005 ELECTROCARDIOGRAM TRACING: CPT

## 2019-06-03 PROCEDURE — 81000 URINALYSIS NONAUTO W/SCOPE: CPT

## 2019-06-03 PROCEDURE — 70450 CT HEAD/BRAIN W/O DYE: CPT

## 2019-06-03 PROCEDURE — 96376 TX/PRO/DX INJ SAME DRUG ADON: CPT

## 2019-06-03 PROCEDURE — 96374 THER/PROPH/DIAG INJ IV PUSH: CPT

## 2019-06-03 PROCEDURE — 96361 HYDRATE IV INFUSION ADD-ON: CPT

## 2019-06-03 PROCEDURE — 36415 COLL VENOUS BLD VENIPUNCTURE: CPT

## 2019-06-03 PROCEDURE — 51702 INSERT TEMP BLADDER CATH: CPT

## 2019-06-03 PROCEDURE — 93041 RHYTHM ECG TRACING: CPT

## 2019-06-03 PROCEDURE — 83735 ASSAY OF MAGNESIUM: CPT

## 2019-06-03 PROCEDURE — 84100 ASSAY OF PHOSPHORUS: CPT

## 2019-06-03 PROCEDURE — 80306 DRUG TEST PRSMV INSTRMNT: CPT

## 2019-06-03 PROCEDURE — 80320 DRUG SCREEN QUANTALCOHOLS: CPT

## 2019-06-03 PROCEDURE — 80053 COMPREHEN METABOLIC PANEL: CPT

## 2019-06-03 PROCEDURE — 96372 THER/PROPH/DIAG INJ SC/IM: CPT

## 2019-06-03 PROCEDURE — 87088 URINE BACTERIA CULTURE: CPT

## 2019-06-03 PROCEDURE — 80329 ANALGESICS NON-OPIOID 1 OR 2: CPT

## 2019-06-03 PROCEDURE — 86141 C-REACTIVE PROTEIN HS: CPT

## 2019-06-03 NOTE — XMS REPORT
Continuity of Care Document

                             Created on: 10/16/2013



LISSET LOVE

External Reference #: [id number]

: 1990

Sex: Female



Demographics







                          Address                   [address]

[place]  [address]

 

                          Home Phone                [phone]

 

                          Preferred Language        Unknown

 

                          Marital Status            Unknown

 

                          Yazdanism Affiliation     Unknown

 

                          Race                      Unknown

 

                          Ethnic Group              Unknown





Author







                          Author                    [facility]

 

                          Organization              [facility]

 

                          Address                   Unknown

 

                          Phone                     Unavailable







Support







                Name            Relationship    Address         Phone

 

                    ADAMARIS LOVE         Next Of Kin         [address] [place]  [address] [phone]







Care Team Providers







                    Care Team Member Name    Role                Phone

 

                    ESTELITA ANDREW ZACHARY DOWLING    PP                  [phone]



                                            



Insurance Providers

                      





                    Payer Name                      Policy Number                      Subscriber Name  

                                        Relationship                

 

                    Self Pay                                             Lisset Love                  

                                        01 Self / Same As Patient                



                                                                    



Advance Directives

                      





                    Directive                      Response                      Recorded Date          

      

 

                    Advance Directives                      N                      10/15/13 12:45am     

           

 

                    Health Care Power of                       N                      10/15/13 12:45am

                

 

                    Organ Donor                      N                      10/15/13 12:45am            

    



                                                                                
       



Problems

          No Known Problems or Medical conditions.                              
                                     



Family History

                      





                    History                      Response                      Recorded Date/Time       

         

 

                    Hx Family Cancer                      Y                      10/15/13 12:51am       

         

 

                    Hx Family Breast Cancer                      Y                      10/15/13 12:51am

                

 

                    Hx Family Lung Cancer                      Y                      10/15/13 12:51am  

              

 

                    Hx Family Colorectal Cancer                      N                      10/15/13 12:51am

                

 

                    Hx Family Cardiac Disorders                      N                      10/15/13 12:51am

                

 

                    Hx Family Stroke                      Y                      10/15/13 12:51am       

         

 

                    Hx Family Hypertension                      Y                      10/15/13 12:51am 

               

 

                    Hx Family Myocardial Infarction                      Y                      10/15/13

 12:51am                

 

                    Hx Family Cystic Fibrosis                      Y                      10/15/13 12:51am

                



                                                                    



Social History

                      





                    History                      Response                      Recorded Date/Time       

         

 

                    Alcohol Use                      Denies Use                      10/15/13 12:47am   

             

 

                    Recreational Drug Use                      N                      10/15/13 12:47am  

              

 

                    Recent Foreign Travel                      N                      10/15/13 12:47am  

              

 

                    Recent Infectious Disease Exposure                      N                      10/15/13

 12:47am                

 

                    Hospitalization with Isolation                      Denies                      10/16/13

 10:21pm                

 

                    Sexually Transmitted Disease                      N                      10/15/13 12:47am

                

 

                    HIV/AIDS                      N                      10/15/13 12:47am               

 



                                                                                
       



Allergies, Adverse Reactions, Alerts

                      





                Allergen                      Type                      Severity                      

Reaction                                Last Updated                

 

                    hydromorphone HCl                      Adverse Reaction                      Mild   

                          ITCHING                      10/15/13                



                                                                                
                 



Medications

                      





                Medication                      Dose                      Units                      Route

                      Sig                      Qty                      Days           

     

 

                    Naproxen (Naprosyn)                      1                      Each                

                PO                      BID                                             14         

       

 

                    Ondansetron HCl (Zofran)                      1                      Tab            

                PO                      Q6H                      7                             

          

 

                Tramadol Hcl                      50                      Mg                      PO  

                    Q6H                                             7                

 

                    Phenazopyridine HCl (Pyridium)                      1                      Each     

                 PO                      TID                      6                      

                 

 

                    Ciprofloxacin (Cipro)                      1                      Tab               

                PO                      BID                                             10        

        

 

                [zoloft]                                                                              

                                                                                   

 

                [flexeril]                                                                            

                                                                                   

 

                          Penicillin G Potassium (Pen-Vee K 250 Mg)                      1                  

                Tab                      PO                      QID                      30         

                                                         



                                                                                
                                                                    



Immunizations

                      





                    Name                      Given                      Type                

 

                          influenza, split (incl. purified surface antigen)                      10/16/13   

                                        A                

 

                          pneumococcal polysaccharide PPV23                      10/16/13                   

                                        A                

 

                          influenza, split (incl. purified surface antigen)                      10/16/13   

                                        A                

 

                          pneumococcal polysaccharide PPV23                      10/16/13                   

                                        A                



                                                                                
                            



Pregnancy

                      





                          Response                      Recorded Date/Time                

 

                          Status not known                      Unknown                



                                                                              



Results

                      





                Test                      Date                      Result                      Interp.

                                        Ref. Range                

 

                          Alanine Aminotransferase (ALT/SGPT)                      2013 10:20pm

                      39  U/L                      N                      30-65        

        

 

                    Albumin                      2013 10:20pm                      3.8  G/DL

                          N                         3.4-5.0                

 

                          Alkaline Phosphatase                      2013 10:20pm               

                    96  U/L                      N                                      

 

                    Amylase Level                      2013 10:20pm                      41

  U/L                      N                                         

 

                          Aspartate Amino Transf (AST/SGOT)                      2013 10:20pm  

                    17  U/L                      N                      15-37          

      

 

                          BUN/Creatinine Ratio                      2013 9:30pm                 

                    12                                             -                

 

                    Band Neutrophils                      2008 5:50pm                      

0  %                                                -                

 

                          Basophils # (Auto)                      2013 9:30pm                   

                    0.0  10^3/uL                      N                      0.0-0.1                

 

                          Basophils (%) (Auto)                      2013 9:30pm                 

                    0  %                      N                      0-10                

 

                          Blood Urea Nitrogen                      2013 9:30pm                  

                    11  MG/DL                      N                      7-18                

 

                          C-Reactive Protein                      2013 9:30pm                   

                    < 0.2  MG/DL                      L                      0.2-0.9                

 

                    Calcium Level                      2013 9:30pm                      8.9 

 MG/DL                      N                         8.5-10.1                

 

                          Carbon Dioxide Level                      2013 9:30pm                 

                    25  MMOL/L                      N                      21-32                

 

                          Chlamydia DNA Probe                      2012 7:25pm                 

                    NEG                                             -                

 

                          Chlamydia/GC DNA Probe Source                      2012 7:25pm       

                    CERVIX                                             -                

 

                    Chloride Level                      2013 9:30pm                      103

  MMOL/L                      N                         101-110                

 

                    Creatinine                      2013 9:30pm                      0.9  MG/DL

                          N                         0.6-1.3                

 

                          Eosinophils # (Auto)                      2013 9:30pm                 

                    0.1  10^3/uL                      N                      0.0-0.3                

 

                          Eosinophils % (Manual)                      2008 5:50pm              

                    0  %                                             -                

 

                          Eosinophils (%) (Auto)                      2013 9:30pm               

                    1  %                      N                      0-10                

 

                          Erythrocyte Sedimentation Rate                      2011 1:00pm      

                    8  MM/HR                      N                      0-20              

  

 

                    Free Thyroxine                      2008 4:05pm                      0.85

  NG/DL                      N                         0.59-1.17                

 

                    Glucose Level                      2013 9:30pm                      111 

 MG/DL                      H                                         

 

                          Group A Streptococcus Screen                      May 25, 2012 12:53pm            

                    POSITIVE                      H                      -                

 

                    Hematocrit                      2013 9:30pm                      41  %  

                          N                         35-52                

 

                    Hemoglobin                      2013 9:30pm                      14.3  G/DL

                          N                         11.5-16.0                

 

                          Human Chorionic Gonadotropin, Quant                      2012 6:57pm 

                     < 6  MIU/ML                                             -6        

        

 

                    Lipase                      2013 10:20pm                      119  U/L 

                          N                                         

 

                          Lymphocytes # (Auto)                      2013 9:30pm                 

                    2.2  X 10^3                      N                      1.0-4.0                

 

                          Lymphocytes % (Manual)                      2008 5:50pm              

                    15  %                                             -                

 

                          Lymphocytes (%) (Auto)                      2013 9:30pm               

                    19  %                      N                      12-44                

 

                    Magnesium Level                      2012 6:25am                      2.0 

 MG/DL                      N                         1.8-2.4                

 

                          Mean Corpuscular Hemoglobin                      2013 9:30pm          

                    31  PG                      N                      25-34                

 

                          Mean Corpuscular Hemoglobin Concent                      2013 9:30pm  

                    35  G/DL                      N                      32-36         

       

 

                          Mean Corpuscular Volume                      2013 9:30pm              

                    88  FL                      N                      80-99                

 

                          Mean Platelet Volume                      2013 9:30pm                 

                    9.4  FL                      N                      7.4-10.4                

 

                          Monocytes # (Auto)                      2013 9:30pm                   

                    0.6  X 10^3                      N                      0.0-1.0                

 

                          Monocytes % (Manual)                      2008 5:50pm                

                    6  %                                             -                

 

                          Monocytes (%) (Auto)                      2013 9:30pm                 

                    5  %                      N                      0-12                

 

                          Neisseria gonorrhoeae DNA Probe                      2012 7:25pm     

                    NEG                                             -                

 

                          Neutrophils # (Auto)                      2013 9:30pm                 

                    9.0  X 10^3                      H                      1.8-7.8                

 

                          Neutrophils % (Manual)                      2008 5:50pm              

                    79  %                                             -                

 

                          Neutrophils (%) (Auto)                      2013 9:30pm               

                    75  %                      N                      42-75                

 

                    Platelet Count                      2013 9:30pm                      297

  10^3/uL                      N                         130-400                

 

                    Potassium Level                      2013 9:30pm                      3.5

  MMOL/L                      L                         3.6-5.0                

 

                    Red Blood Count                      2013 9:30pm                      4.65

  10^6/uL                      N                         4.35-5.85                

 

                          Red Cell Distribution Width                      2013 9:30pm          

                    12.1  %                      N                      10.0-14.5              

  

 

                          Serum Pregnancy Test, Qualitative                      2012 1:10am   

                    NEGATIVE                                             -              

  

 

                    Sodium Level                      2013 9:30pm                      137  

MMOL/L                      N                         135-145                

 

                          Thyroid Stimulating Hormone (TSH)                      2008 4:05pm   

                    2.70  UIU/ML                      N                      0.34-5.60  

              

 

                    Total Bilirubin                      2013 10:20pm                      

0.7  MG/DL                      N                         0.0-1.0                

 

                          Total Creatine Kinase                      2012 11:40pm              

                    77  U/L                      N                      1-159                

 

                    Total Protein                      2013 10:20pm                      7.9

  G/DL                      N                         6.4-8.2                

 

                          Ur Tricyclic Antidepressants Screen                      2013 9:00pm 

                     NEGATIVE                                             -            

    

 

                          Urine Amphetamines Screen                      2013 9:00pm           

                    NEGATIVE                                             -                

 

                    Urine Bacteria                      2013 9:25pm                      TRACE

  /HPF                                                -                

 

                          Urine Barbiturates Screen                      2013 9:00pm           

                    NEGATIVE                                             -                

 

                          Urine Benzodiazepines Screen                      2013 9:00pm        

                    NEGATIVE                                             -                

 

                    Urine Bilirubin                      2013 9:25pm                      NEGATIVE

                                                    -                

 

                    Urine Casts                      2013 9:25pm                      NONE  

/LPF                                                -                

 

                    Urine Clarity                      2013 9:25pm                      CLEAR

                                                    -                

 

                          Urine Cocaine Screen                      2013 9:00pm                

                    NEGATIVE                                             -                

 

                    Urine Color                      2013 9:25pm                      YELLOW

                                                    -                

 

                    Urine Crystals                      2013 9:25pm                      NONE

  /LPF                                                -                

 

                          Urine Culture Indicated                      2013 9:25pm              

                    NO                                             -                

 

                          Urine Glucose (UA)                      2013 9:25pm                   

                    NEGATIVE                                             -                

 

                    Urine Ketones                      2013 9:25pm                      NEGATIVE

                                                    -                

 

                          Urine Leukocyte Esterase                      2013 9:25pm             

                    NEGATIVE                                             -                

 

                          Urine Methamphetamines Screen                      2013 9:00pm       

                    NEGATIVE                                             -                

 

                    Urine Mucus                      2013 9:25pm                      NEGATIVE

  /LPF                                                -                

 

                    Urine Nitrate                      2008 4:55pm                      Negative

                                                    -                

 

                    Urine Nitrite                      2013 9:25pm                      NEGATIVE

                                                    -                

 

                          Urine Opiates Screen                      2013 9:00pm                

                    NEGATIVE                                             -                

 

                    Urine Other                      2013 9:25pm                      FEW SPERM

  /HPF                      H                         -                

 

                          Urine Phencyclidine Screen                      2013 9:00pm          

                    NEGATIVE                                             -                

 

                    Urine Pregnancy Test                      2008 2:25pm                      

Negative                                                -                

 

                          Urine Propoxyphene Screen                      2013 9:00pm           

                    NEGATIVE                                             -                

 

                    Urine Protein                      2013 9:25pm                      NEGATIVE

                                                    -                

 

                    Urine RBC                      2013 9:25pm                      0-2  /HPF

                                                    -                

 

                          Urine Specific Gravity                      2013 9:25pm               

                    1.015                      L                      -                

 

                          Urine Squamous Epithelial Cells                      2013 9:25pm      

                    10-25  /HPF                      H                      -              

  

 

                          Urine Urobilinogen                      2013 9:25pm                   

                    NORMAL  MG/DL                                             -                

 

                    Urine WBC                      2013 9:25pm                      0-2  /HPF

                                                    -                

 

                    Urine pH                      2013 9:25pm                      6        

                                                    -                

 

                    White Blood Count                      2013 9:30pm                      

11.9  10^3/uL                      H                         4.3-11.0                

 

                    Serum Alcohol                      2012 11:40pm                      < 

5  MG/DL                                                -5                

 

                    Glucometer                      2013 9:05pm                      105  MG/DL

                          N                                         

 

                    Lab Scanned Report                      2012 6:40pm                      LAB

 Reports  [id number]                                                -                

 

                          Estimat Glomerular Filtration Rate                      2013 9:30pm   

                    > 60                                             -                

 

                          Urine Oxycodone Screen                      2013 9:00pm              

                    NEGATIVE                                             -                

 

                          Blood Morphology Comment                      2008 5:50pm            

                    Normal                                             -                

 

                          Urine Methadone Screen                      2013 9:00pm              

                    NEGATIVE                                             -                

 

                          Urine Cannabinoids Screen                      2013 9:00pm           

                    NEGATIVE                                             -                

 

                          Urine Buprenorphine                      2013 9:00pm                 

                    NEGATIVE                                             -                

 

                    Urine RBC (Auto)                      2013 9:25pm                      1+

                          H                         -                



                                                                                
                                                                                
                                                                                
                                                                                
                                                                                
                                                                                
                                                                                
                                                                                
                                                                                
                                                                                
                                                                                
                                                                                
                                                          



Procedures

                      





                    Procedure                      Code                      Date                

 

                    LOW CERVICAL                       74.1                      08      

          

 

                    VENOUS CATHETERIZATION NEC                      38.93                      12 

               

 

                    Blood Culture                                             12                

 

                    Genital Culture                                             12                



 

                    Urine Culture                                             13                



                                                                                
                                      



Encounters

                      





                    Encounter                      Location                      Date/Time              

  

 

                    Discharged Inpatient                      [facility]                      10/14/13

 11:50pm                

 

                    Departed Emergency Room                      [facility]                      13

 8:51pm
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
## 2019-06-03 NOTE — XMS REPORT
[facility]

                             Created on: 2019



Lisset Mabry

External Reference #: [id number]

: 1990

Sex: Female



Demographics







                          Address                    [address] [place] [address]  [address]

 

                          Preferred Language        Unknown

 

                          Marital Status            Unknown

 

                          Yarsanism Affiliation     Unknown

 

                          Race                      Unknown

 

                          Ethnic Group              Unknown





Author







                          Author                    Migration,  Doctor

 

                          Organization              [facility]

 

                          Address                   Unknown

 

                          Phone                     Unavailable







Care Team Providers







                    Care Team Member Name    Role                Phone

 

                    Migration,  Doctor    Unavailable         Unavailable







PROBLEMS







          Type      Condition    ICD9-CM Code    OPP45-FM Code    Onset Dates    Condition Status    SNOMED

 Code

 

          Problem    Pregnancy examination or test, positive result    V72.42                        Active    070876617



 

          Problem    Abdominal pain, periumbilic    789.05                        Active    [id number]

 

          Problem    Abdominal pain, other specified site    789.09                        Active    61107881

 

          Problem    Dysuria    788.1                         Active    16013649

 

          Problem    Urinary frequency    788.41                        Active    576459783

 

          Problem    Rash and other nonspecific skin eruption    782.1                         Active    033729399



 

          Problem    Lumbago    724.2                         Active    963564588

 

          Problem    Unspecified backache    724.5                         Active    274711138

 

          Problem    Posttraumatic stress disorder    309.81                        Active    42928701

 

          Problem    Polydipsia    783.5                         Active    09587312

 

          Problem    Obesity, unspecified    278.00                        Active    955240758

 

          Problem    Cough     786.2                         Active    53509031

 

          Problem    Irregular menstrual cycle    626.4                         Active    40186591

 

             Problem      Unspecified symptom associated with female genital organs    625.9                       

                          Active                    [id number]

 

          Problem    Urinary tract infection, site not specified    599.0                         Active    47745063



 

          Problem    Anal or rectal pain    569.42                        Active    67810643







ALLERGIES

No Information



ENCOUNTERS







                Encounter       Location        Date            Diagnosis

 

                          [facility]     [address] [place] [address]

                                         

 

                          [facility]     [id number] [place] [address]

                                         

 

                          [facility]     [id number] [place] [address]

                                         

 

                          [facility]     [id number] [place] [address]

                                         

 

                          [facility]     [address] [place] [address]

                                         

 

                          [facility] [place] [facility]     [address] [id number] [place] [address]

                                         

 

                          [facility] [place] [facility]     [address] [id number] [place] [address]

                                         

 

                          [facility] [place] [facility]     [address] [id number] [place] [address]

                                         

 

                          [facility] [place] [facility]     [address] [id number] [place] [address]

                                         

 

                          [facility] [place] [facility]     [address] [id number] [place] [address]

                                         

 

                          [facility] [place] [facility]     [address] [id number] [place] [address]

                                         

 

                          [facility] [place] [facility]     [address] [id number] [place] [address]

                          24 Oct, 2013               

 

                          [facility] [place] [facility]     [address] [id number] [place] [address]

                          11 Oct, 2013               

 

                          [facility] [place] [facility]     [address] [id number] [place] [address]

                          10 Oct, 2013               

 

                          [facility] [place] [facility]     [address] [id number] [place] [address]

                          10 Oct, 2013               

 

                          [facility] [place] [facility]     [address] [id number] [place] [address]

                          16 Aug, 2013               

 

                          [facility] [place] [facility]     [address] [id number] [place] [address]

                          02 Aug, 2013               

 

                          [facility] [place] [facility]     [address] [id number] [place] [address]

                                         

 

                          [facility] [place] [facility]     [address] [id number] [place] [address]

                                         

 

                          [facility] [place] [facility]     [address] [id number] [place] [address]

                                         

 

                          [facility] [place] [facility]     [address] [id number] [place] [address]

                                         

 

                          [facility]     [address] [id number] [place] [address]

                                         

 

                          [facility] [place] [facility]     [address] [id number] [place] [address]

                          31 May, 2013               

 

                          [facility] [place] [facility]     [address] [id number] [place] [address]

                          31 May, 2013               

 

                          [facility] [place] [facility]     [address] [id number] [place] [address]

                          01 May, 2013               

 

                          [facility]     [address] [id number] [place] [address]

                                         

 

                          [facility] [place] [facility]     [address] [id number] [place] [address]

                          10 Apr, 2013               

 

                          [facility]     [address] [id number] [place] [address]

                          22 Mar, 2013               

 

                          [facility] [place] [facility]     [address] [id number] [place] [address]

                          18 Mar, 2013               

 

                          [facility] [place] [facility]     [address] [id number] [place] [address]

                          05 Mar, 2013               

 

                          [facility] [place] [facility]     [address] [id number] [place] [address]

                          04 Mar, 2013               

 

                          [facility] [place] [facility]     [address] [id number] [place] [address]

                          04 Mar, 2013               

 

                          [facility] [place] [facility]     [address] [id number] [place] [address]

                                         

 

                          [facility] [place] [facility]     [address] [id number] [place] [address]

                          17 Dec, 2012               

 

                          [facility] [place] [facility]     [address] [id number] [place] [address]

                          05 Dec, 2012               

 

                          [facility] [place] [facility]     [address] [id number] [place] [address]

                          05 Dec, 2012               

 

                          [facility] [place] [facility]     [address] [id number] [place] [address]

                          03 Dec, 2012               

 

                          [facility]     [address] [id number] [place] [address]

                          03 Dec, 2012               

 

                          [facility] [place] [facility]     [address] [id number] [place] [address]

                                         

 

                          [facility] [place] [facility]     [address] [id number] [place] [address]

                                         

 

                          [facility] [place] [facility]     [address] [id number] [place] [address]

                                         

 

                          [facility] [place] [facility]     [address] [id number] [place] [address], [place] [address]

                                         

 

                          [facility] [place] [facility]     [address] [id number] [place] [address]

                                         

 

                          [facility] [place] [facility]     [address] [id number] [place] [address]

                                         

 

                          [facility] [place] [facility]     [address] [id number] [place] [address]

                                         

 

                          [facility] [place] [facility]     [address] [id number] [place] [address]

                                         

 

                          [facility] [place] [facility]     [address] [facility] [id number] [place] [address]

                                         

 

                          [facility] [place] [facility]     [address] [id number] [place] [address]

                          24 Oct, 2012               

 

                          [facility] [place] [facility]     [address] [id number] [place] [address]

                          24 Oct, 2012               

 

                          [facility] [place] [facility]     [address] [id number] [place] [address]

                          24 Sep, 2012               

 

                          [facility] [place] [facility]     [address] [id number] [place] [address]

                          19 Sep, 2012               

 

                          [facility] [place] [facility]     [address] [id number] [place] [address]

                          17 Sep, 2012               

 

                          [facility]     [address] [facility] [id number] [place] [address]

                                         

 

                          [facility]     [address] [facility] [id number] [place] [address]

                          15 Oct, 2010               







IMMUNIZATIONS

No Known Immunizations



SOCIAL HISTORY

Never Assessed



REASON FOR VISIT

[facility]



PLAN OF CARE





VITAL SIGNS





MEDICATIONS







        Medication    Instructions    Dosage    Frequency    Start Date    End Date    Duration    Status



 

             Cipro 500 mg                 1 tablet by Oral route every 12 hours for 7 day(s)                 10 Oct, 

2013                                                        Active

 

                    Pyridium 200 mg                         1 tablet by Oral route 3 times per day for 3 day(s) Take witih

 meals for bladder pain                 10 Oct, 2013                              Active

 

                    Nitrofurantoin Macrocrystal 100 mg                        1 capsule by Oral route 2 times per day for

 10 day(s)                 04 Mar, 2013                              Active

 

                    Vicoprofen 7.5-200 mg                        take 1 tablet by oral route every 6 hours as needed for

 pain not to exceed 5 tablets in 24hrs PRN                                               Active

 

          Atarax 25 mg              1 tablet by Oral route every 6 hours PRN                             

                                        Active







RESULTS

No Results



PROCEDURES

No Known procedures



INSTRUCTIONS





MEDICATIONS ADMINISTERED

No Known Medications

## 2019-06-03 NOTE — XMS REPORT
[facility]

                             Created on: 2019



Lisset Mabry

External Reference #: [id number]

: 1990

Sex: Female



Demographics







                          Address                    [address] [place] [address]  [id number]

 

                          Preferred Language        Unknown

 

                          Marital Status            Unknown

 

                          Scientology Affiliation     Unknown

 

                          Race                      Unknown

 

                          Ethnic Group              Unknown





Author







                          Author                    Migration,  Doctor

 

                          Organization              [facility]

 

                          Address                   Unknown

 

                          Phone                     Unavailable







Care Team Providers







                    Care Team Member Name    Role                Phone

 

                    Migration,  Doctor    Unavailable         Unavailable







PROBLEMS







          Type      Condition    ICD9-CM Code    BPV71-VD Code    Onset Dates    Condition Status    SNOMED

 Code

 

          Problem    Pregnancy examination or test, positive result    V72.42                        Active    638382915



 

          Problem    Abdominal pain, periumbilic    789.05                        Active    450787564

 

          Problem    Abdominal pain, other specified site    789.09                        Active    20153225

 

          Problem    Dysuria    788.1                         Active    04564264

 

          Problem    Urinary frequency    788.41                        Active    254144509

 

          Problem    Rash and other nonspecific skin eruption    782.1                         Active    976019425



 

          Problem    Lumbago    724.2                         Active    240000732

 

          Problem    Unspecified backache    724.5                         Active    382673514

 

          Problem    Posttraumatic stress disorder    309.81                        Active    03642722

 

          Problem    Polydipsia    783.5                         Active    80936891

 

          Problem    Obesity, unspecified    278.00                        Active    292293728

 

          Problem    Cough     786.2                         Active    23861944

 

          Problem    Irregular menstrual cycle    626.4                         Active    85695512

 

             Problem      Unspecified symptom associated with female genital organs    625.9                       

                          Active                    745290689

 

          Problem    Urinary tract infection, site not specified    599.0                         Active    03751785



 

          Problem    Anal or rectal pain    569.42                        Active    88948420







ALLERGIES

No Information



ENCOUNTERS







                Encounter       Location        Date            Diagnosis

 

                          [facility]     [address] [place] [address]

                                         

 

                          [facility]     [id number] [place] [address]

                                         

 

                          [facility]     [id number] [place] [address]

                                         

 

                          [facility]     [id number] [place] [address]

                                         

 

                          [facility]     [address] [place] [address]

                                         

 

                          [facility] [place] [facility]     [address] [id number] [place] [address]

                                         

 

                          [facility] [place] [facility]     [address] [id number] [place] [address]

                                         

 

                          [facility] [place] [facility]     [address] [id number] [place] [address]

                                         

 

                          [facility] [place] [facility]     [address] [id number] [place] [address]

                                         

 

                          [facility] [place] [facility]     [address] [id number] [place] [address]

                                         

 

                          [facility] [place] [facility]     [address] [id number] [place] [address]

                                         

 

                          [facility] [place] [facility]     [address] [id number] [place] [address]

                          24 Oct, 2013               

 

                          [facility] [place] [facility]     [address] [id number] [place] [address]

                          11 Oct, 2013               

 

                          [facility] [place] [facility]     [address] [id number] [place] [address]

                          10 Oct, 2013               

 

                          [facility] [place] [facility]     [address] [id number] [place] [address]

                          10 Oct, 2013               

 

                          [facility] [place] [facility]     [address] [id number] [place] [address]

                          16 Aug, 2013               

 

                          [facility] [place] [facility]     [address] [id number] [place] [address]

                          02 Aug, 2013               

 

                          [facility] [place] [facility]     [address] [id number] [place] [address]

                                         

 

                          [facility] [place] [facility]     [address] [id number] [place] [address]

                                         

 

                          [facility] [place] [facility]     [address] [id number] [place] [address]

                                         

 

                          [facility] [place] [facility]     [address] [id number] [place] [address]

                                         

 

                          [facility]     [address] [id number] [place] [address]

                                         

 

                          [facility] [place] [facility]     [address] [id number] [place] [address]

                          31 May, 2013               

 

                          [facility] [place] [facility]     [address] [id number] [place] [address]

                          31 May, 2013               

 

                          [facility] [place] [facility]     [address] [id number] [place] [address]

                          01 May, 2013               

 

                          [facility]     [address] [id number] [place] [address]

                                         

 

                          [facility] [place] [facility]     [address] [id number] [place] [address]

                          10 Apr, 2013               

 

                          [facility]     [address] [id number] [place] [address]

                          22 Mar, 2013               

 

                          [facility] [place] [facility]     [address] [id number] [place] [address]

                          18 Mar, 2013               

 

                          [facility] [place] [facility]     [address] [id number] [place] [address]

                          05 Mar, 2013               

 

                          [facility] [place] [facility]     [address] [id number] [place] [address]

                          04 Mar, 2013               

 

                          [facility] [place] [facility]     [address] [id number] [place] [address]

                          04 Mar, 2013               

 

                          [facility] [place] [facility]     [address] [id number] [place] [address]

                                         

 

                          [facility] [place] [facility]     [address] [id number] [place] [address]

                          17 Dec, 2012               

 

                          [facility] [place] [facility]     [address] [id number] [place] [address]

                          05 Dec, 2012               

 

                          [facility] [place] [facility]     [address] [id number] [place] [address]

                          05 Dec, 2012               

 

                          [facility] [place] [facility]     [address] [id number] [place] [address]

                          03 Dec, 2012               

 

                          [facility]     [address] [id number] [place] [address]

                          03 Dec, 2012               

 

                          [facility] [place] [facility]     [address] [id number] [place] [address]

                                         

 

                          [facility] [place] [facility]     [address] [id number] [place] [address]

                                         

 

                          [facility] [place] [facility]     [address] [id number] [place] [address]

                                         

 

                          [facility] [place] [facility]     [id number] [place] [address], [place] [address]

                                         

 

                          [facility] [place] [facility]     [address] [id number] [place] [address]

                                         

 

                          [facility] [place] [facility]     [address] [id number] [place] [address]

                                         

 

                          [facility] [place] [facility]     [address] [id number] [place] [address]

                                         

 

                          [facility] [place] [facility]     [address] [id number] [place] [address]

                                         

 

                          [facility] [place] [facility]     [address] [facility] [id number] [place] [address]

                                         

 

                          [facility] [place] [facility]     [address] [id number] [place] [address]

                          24 Oct, 2012               

 

                          [facility] [place] [facility]     [address] [id number] [place] [address]

                          24 Oct, 2012               

 

                          [facility] [place] [facility]     [address] [id number] [place] [address]

                          24 Sep, 2012               

 

                          [facility] [place] [facility]     [address] [id number] [place] [address]

                          19 Sep, 2012               

 

                          [facility] [place] [facility]     [address] [id number] [place] [address]

                          17 Sep, 2012               

 

                          [facility]     [address] [facility] [id number] [place] [address]

                                         

 

                          [facility]     [address] [facility] [id number] [place] [address]

                          15 Oct, 2010               







IMMUNIZATIONS

No Known Immunizations



SOCIAL HISTORY

Never Assessed



REAS[place] FOR VISIT

[facility] [place]





VITAL SIGNS





MEDICATIONS

Unknown Medications



RESULTS

No Results



PROCEDURES

No Known procedures



INSTRUCTIONS





MEDICATIONS ADMINISTERED

No Known Medications

## 2019-06-03 NOTE — XMS REPORT
[facility]

                             Created on: 2019



Lisset Mabry

External Reference #: [id number]

: 1990

Sex: Female



Demographics







                          Address                    [address] [place] [address]

 

                          Preferred Language        Unknown

 

                          Marital Status            Unknown

 

                          Episcopalian Affiliation     Unknown

 

                          Race                      Unknown

 

                          Ethnic Group              Unknown





Author







                          Author                    Migration,  Doctor

 

                          Organization              [facility]

 

                          Address                   Unknown

 

                          Phone                     Unavailable







Care Team Providers







                    Care Team Member Name    Role                Phone

 

                    Migration,  Doctor    Unavailable         Unavailable







PROBLEMS







          Type      Condition    ICD9-CM Code    ETP14-JT Code    Onset Dates    Condition Status    SNOMED

 Code

 

          Problem    Pregnancy examination or test, positive result    V72.42                        Active    068542397



 

          Problem    Abdominal pain, periumbilic    789.05                        Active    932299834

 

          Problem    Abdominal pain, other specified site    789.09                        Active    75645539

 

          Problem    Dysuria    788.1                         Active    95244792

 

          Problem    Urinary frequency    788.41                        Active    160867547

 

          Problem    Rash and other nonspecific skin eruption    782.1                         Active    873133053



 

          Problem    Lumbago    724.2                         Active    845721592

 

          Problem    Unspecified backache    724.5                         Active    929142635

 

          Problem    Posttraumatic stress disorder    309.81                        Active    86145223

 

          Problem    Polydipsia    783.5                         Active    35057203

 

          Problem    Obesity, unspecified    278.00                        Active    955914630

 

          Problem    Cough     786.2                         Active    85267826

 

          Problem    Irregular menstrual cycle    626.4                         Active    38260122

 

             Problem      Unspecified symptom associated with female genital organs    625.9                       

                          Active                    [id number]

 

          Problem    Urinary tract infection, site not specified    599.0                         Active    53938789



 

          Problem    Anal or rectal pain    569.42                        Active    97222695







ALLERGIES

No Information



ENCOUNTERS







                Encounter       Location        Date            Diagnosis

 

                          [facility]     [address] [place] [address]

                                         

 

                          [facility]     [id number] [place] [address]

                                         

 

                          [facility]     [id number] [place] [address]

                                         

 

                          [facility]     [id number] [place] [address]

                                         

 

                          [facility]     [address] [place] [address]

                                         

 

                          [facility] [place] [facility]     [address] [id number] [place] [address]

                                         

 

                          [facility] [place] [facility]     [address] [id number] [place] [address]

                                         

 

                          [facility] [place] [facility]     [address] [id number] [place] [address]

                                         

 

                          [facility] [place] [facility]     [address] [id number] [place] [address]

                                         

 

                          [facility] [place] [facility]     [address] [id number] [place] [address]

                                         

 

                          [facility] [place] [facility]     [address] [id number] [place] [address]

                                         

 

                          [facility] [place] [facility]     [address] [id number] [place] [address]

                          24 Oct, 2013               

 

                          [facility] [place] [facility]     [address] [id number] [place] [address]

                          11 Oct, 2013               

 

                          [facility] [place] [facility]     [address] [id number] [place] [address]

                          10 Oct, 2013               

 

                          [facility] [place] [facility]     [address] [id number] [place] [address]

                          10 Oct, 2013               

 

                          [facility] [place] [facility]     [address] [id number] [place] [address]

                          16 Aug, 2013               

 

                          [facility] [place] [facility]     [address] [id number] [place] [address]

                          02 Aug, 2013               

 

                          [facility] [place] [facility]     [address] [id number] [place] [address]

                                         

 

                          [facility] [place] [facility]     [address] [id number] [place] [address]

                                         

 

                          [facility] [place] [facility]     [address] [id number] [place] [address]

                                         

 

                          [facility] [place] [facility]     [address] [id number] [place] [address]

                                         

 

                          [facility]     [address] [id number] [place] [address]

                                         

 

                          [facility] [place] [facility]     [address] [id number] [place] [address]

                          31 May, 2013               

 

                          [facility] [place] [facility]     [address] [id number] [place] [address]

                          31 May, 2013               

 

                          [facility] [place] [facility]     [address] [id number] [place] [address]

                          01 May, 2013               

 

                          [facility]     [address] [id number] [place] [address]

                                         

 

                          [facility] [place] [facility]     [address] [id number] [place] [address]

                          10 Apr, 2013               

 

                          [facility]     [address] [id number] [place] [address]

                          22 Mar, 2013               

 

                          [facility] [place] [facility]     [address] [id number] [place] [address]

                          18 Mar, 2013               

 

                          [facility] [place] [facility]     [address] [id number] [place] [address]

                          05 Mar, 2013               

 

                          [facility] [place] [facility]     [address] [id number] [place] [address]

                          04 Mar, 2013               

 

                          [facility] [place] [facility]     [address] [id number] [place] [address]

                          04 Mar, 2013               

 

                          [facility] [place] [facility]     [address] [id number] [place] [address]

                                         

 

                          [facility] [place] [facility]     [address] [id number] [place] [address]

                          17 Dec, 2012               

 

                          [facility] [place] [facility]     [address] [id number] [place] [address]

                          05 Dec, 2012               

 

                          [facility] [place] [facility]     [address] [id number] [place] [address]

                          05 Dec, 2012               

 

                          [facility] [place] [facility]     [address] [id number] [place] [address]

                          03 Dec, 2012               

 

                          [facility]     [address] [id number] [place] [address]

                          03 Dec, 2012               

 

                          [facility] [place] [facility]     [address] [id number] [place] [address]

                                         

 

                          [facility] [place] [facility]     [address] [id number] [place] [address]

                                         

 

                          [facility] [place] [facility]     [address] [id number] [place] [address]

                                         

 

                          [facility] [place] [facility]     [address] [id number] [place] [address], [place] [address]

                                         

 

                          [facility] [place] [facility]     [address] [id number] [place] [address]

                                         

 

                          [facility] [place] [facility]     [address] [id number] [place] [address]

                                         

 

                          [facility] [place] [facility]     [address] [id number] [place] [address]

                                         

 

                          [facility] [place] [facility]     [address] [id number] [place] [address]

                                         

 

                          [facility] [place] [facility]     [address] [facility] [id number] [place] [address]

                                         

 

                          [facility] [place] [facility]     [address] [id number] [place] [address]

                          24 Oct, 2012               

 

                          [facility] [place] [facility]     [address] [id number] [place] [address]

                          24 Oct, 2012               

 

                          [facility] [place] [facility]     [address] [id number] [place] [address]

                          24 Sep, 2012               

 

                          [facility] [place] [facility]     [address] [id number] [place] [address]

                          19 Sep, 2012               

 

                          [facility] [place] [facility]     [address] [id number] [place] [address]

                          17 Sep, 2012               

 

                          [facility]     [address] [facility] [id number] [place] [address]

                                         

 

                          [facility]     [address] [facility] [id number] [place] [address]

                          15 Oct, 2010               







IMMUNIZATIONS

No Known Immunizations



SOCIAL HISTORY

Never Assessed



REASON FOR VISIT

[facility]



PLAN OF CARE





VITAL SIGNS





MEDICATIONS

Unknown Medications



RESULTS

No Results



PROCEDURES

No Known procedures



INSTRUCTIONS





MEDICATIONS ADMINISTERED

No Known Medications
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
## 2019-06-03 NOTE — XMS REPORT
[facility]

                             Created on: 2019



Lisset Mabry

External Reference #: [id number]

: 1990

Sex: Female



Demographics







                          Address                    [address] [place] [address]  [address]

 

                          Preferred Language        Unknown

 

                          Marital Status            Unknown

 

                          Bahai Affiliation     Unknown

 

                          Race                      Unknown

 

                          Ethnic Group              Unknown





Author







                          Author                    Migration,  Doctor

 

                          Organization              [facility]

 

                          Address                   Unknown

 

                          Phone                     Unavailable







Care Team Providers







                    Care Team Member Name    Role                Phone

 

                    Migration,  Doctor    Unavailable         Unavailable







PROBLEMS







          Type      Condition    ICD9-CM Code    YXP49-WH Code    Onset Dates    Condition Status    SNOMED

 Code

 

          Problem    Pregnancy examination or test, positive result    V72.42                        Active    794382989



 

          Problem    Abdominal pain, periumbilic    789.05                        Active    [id number]

 

          Problem    Abdominal pain, other specified site    789.09                        Active    12019594

 

          Problem    Dysuria    788.1                         Active    14975439

 

          Problem    Urinary frequency    788.41                        Active    701712395

 

          Problem    Rash and other nonspecific skin eruption    782.1                         Active    626351940



 

          Problem    Lumbago    724.2                         Active    250099212

 

          Problem    Unspecified backache    724.5                         Active    103632574

 

          Problem    Posttraumatic stress disorder    309.81                        Active    41465878

 

          Problem    Polydipsia    783.5                         Active    04693935

 

          Problem    Obesity, unspecified    278.00                        Active    748418630

 

          Problem    Cough     786.2                         Active    17373530

 

          Problem    Irregular menstrual cycle    626.4                         Active    24188882

 

             Problem      Unspecified symptom associated with female genital organs    625.9                       

                          Active                    [id number]

 

          Problem    Urinary tract infection, site not specified    599.0                         Active    91335855



 

          Problem    Anal or rectal pain    569.42                        Active    99205831







ALLERGIES

No Information



ENCOUNTERS







                Encounter       Location        Date            Diagnosis

 

                          [facility]     [address] [place] [address]

                                         

 

                          [facility]     [id number] [place] [address]

                                         

 

                          [facility]     [id number] [place] [address]

                                         

 

                          [facility]     [id number] [place] [address]

                                         

 

                          [facility]     [address] [place] [address]

                                         

 

                          [facility] [place] [facility]     [address] [id number] [place] [address]

                                         

 

                          [facility] [place] [facility]     [address] [id number] [place] [address]

                                         

 

                          [facility] [place] [facility]     [address] [id number] [place] [address]

                                         

 

                          [facility] [place] [facility]     [address] [id number] [place] [address]

                                         

 

                          [facility] [place] [facility]     [address] [id number] [place] [address]

                                         

 

                          [facility] [place] [facility]     [address] [id number] [place] [address]

                                         

 

                          [facility] [place] [facility]     [address] [id number] [place] [address]

                          24 Oct, 2013               

 

                          [facility] [place] [facility]     [address] [id number] [place] [address]

                          11 Oct, 2013               

 

                          [facility] [place] [facility]     [address] [id number] [place] [address]

                          10 Oct, 2013               

 

                          [facility] [place] [facility]     [address] [id number] [place] [address]

                          10 Oct, 2013               

 

                          [facility] [place] [facility]     [address] [id number] [place] [address]

                          16 Aug, 2013               

 

                          [facility] [place] [facility]     [address] [id number] [place] [address]

                          02 Aug, 2013               

 

                          [facility] [place] [facility]     [address] [id number] [place] [address]

                                         

 

                          [facility] [place] [facility]     [address] [id number] [place] [address]

                                         

 

                          [facility] [place] [facility]     [address] [id number] [place] [address]

                                         

 

                          [facility] [place] [facility]     [address] [id number] [place] [address]

                                         

 

                          [facility]     [address] [id number] [place] [address]

                                         

 

                          [facility] [place] [facility]     [address] [id number] [place] [address]

                          31 May, 2013               

 

                          [facility] [place] [facility]     [address] [id number] [place] [address]

                          31 May, 2013               

 

                          [facility] [place] [facility]     [address] [id number] [place] [address]

                          01 May, 2013               

 

                          [facility]     [address] [id number] [place] [address]

                                         

 

                          [facility] [place] [facility]     [address] [id number] [place] [address]

                          10 Apr, 2013               

 

                          [facility]     [address] [id number] [place] [address]

                          22 Mar, 2013               

 

                          [facility] [place] [facility]     [address] [id number] [place] [address]

                          18 Mar, 2013               

 

                          [facility] [place] [facility]     [address] [id number] [place] [address]

                          05 Mar, 2013               

 

                          [facility] [place] [facility]     [address] [id number] [place] [address]

                          04 Mar, 2013               

 

                          [facility] [place] [facility]     [address] [id number] [place] [address]

                          04 Mar, 2013               

 

                          [facility] [place] [facility]     [address] [id number] [place] [address]

                                         

 

                          [facility] [place] [facility]     [address] [id number] [place] [address]

                          17 Dec, 2012               

 

                          [facility] [place] [facility]     [address] [id number] [place] [address]

                          05 Dec, 2012               

 

                          [facility] [place] [facility]     [address] [id number] [place] [address]

                          05 Dec, 2012               

 

                          [facility] [place] [facility]     [address] [id number] [place] [address]

                          03 Dec, 2012               

 

                          [facility]     [address] [id number] [place] [address]

                          03 Dec, 2012               

 

                          [facility] [place] [facility]     [address] [id number] [place] [address]

                                         

 

                          [facility] [place] [facility]     [address] [id number] [place] [address]

                                         

 

                          [facility] [place] [facility]     [address] [id number] [place] [address]

                                         

 

                          [facility] [place] [facility]     [address] [id number] [place] [address], [place] [address]

                                         

 

                          [facility] [place] [facility]     [address] [id number] [place] [address]

                                         

 

                          [facility] [place] [facility]     [address] [id number] [place] [address]

                                         

 

                          [facility] [place] [facility]     [address] [id number] [place] [address]

                                         

 

                          [facility] [place] [facility]     [address] [id number] [place] [address]

                                         

 

                          [facility] [place] [facility]     [address] [facility]note 3] [id number] [place] [address]

                                         

 

                          [facility] [place] [facility]     [address] [id number] [place] [address]

                          24 Oct, 2012               

 

                          [facility] [place] [facility]     [address] [id number] [place] [address]

                          24 Oct, 2012               

 

                          [facility] [place] [facility]     [address] [id number] [place] [address]

                          24 Sep, 2012               

 

                          [facility] [place] [facility]     [address] [id number] [place] [address]

                          19 Sep, 2012               

 

                          [facility] [place] [facility]     [address] [id number] [place] [address]

                          17 Sep, 2012               

 

                          [facility]     [address] [facility][note 3] [id number] [place] [address]

                                         

 

                          [facility]     [address] [facility][note 3] [id number] [place] [address]

                          15 Oct, 2010               







IMMUNIZATIONS

No Known Immunizations



SOCIAL HISTORY

Never Assessed



REASON FOR VISIT

[facility]



[place] C[place]





VITAL SIGNS





MEDICATIONS

Unknown Medications



RESULTS

No Results



PROCEDURES

No Known procedures



INSTRUCTIONS





MEDICATIONS ADMINISTERED

No Known Medications

## 2019-06-03 NOTE — XMS REPORT
[facility]

                             Created on: 2019



Lisset Mabry

External Reference #: [id number]

: 1990

Sex: Female



Demographics







                          Address                    [address] [place] [address]  [id number]

 

                          Preferred Language        Unknown

 

                          Marital Status            Unknown

 

                          Voodoo Affiliation     Unknown

 

                          Race                      Unknown

 

                          Ethnic Group              Unknown





Author







                          Author                    Migration,  Doctor

 

                          Organization              [facility]

 

                          Address                   Unknown

 

                          Phone                     Unavailable







Care Team Providers







                    Care Team Member Name    Role                Phone

 

                    Migration,  Doctor    Unavailable         Unavailable







PROBLEMS







          Type      Condition    ICD9-CM Code    TWJ67-DI Code    Onset Dates    Condition Status    SNOMED

 Code

 

          Problem    Pregnancy examination or test, positive result    V72.42                        Active    898224942



 

          Problem    Abdominal pain, periumbilic    789.05                        Active    478139615

 

          Problem    Abdominal pain, other specified site    789.09                        Active    20934769

 

          Problem    Dysuria    788.1                         Active    29932207

 

          Problem    Urinary frequency    788.41                        Active    254362044

 

          Problem    Rash and other nonspecific skin eruption    782.1                         Active    974409216



 

          Problem    Lumbago    724.2                         Active    857097003

 

          Problem    Unspecified backache    724.5                         Active    876600416

 

          Problem    Posttraumatic stress disorder    309.81                        Active    86270581

 

          Problem    Polydipsia    783.5                         Active    10153768

 

          Problem    Obesity, unspecified    278.00                        Active    364593579

 

          Problem    Cough     786.2                         Active    32289903

 

          Problem    Irregular menstrual cycle    626.4                         Active    22984880

 

             Problem      Unspecified symptom associated with female genital organs    625.9                       

                          Active                    [id number]

 

          Problem    Urinary tract infection, site not specified    599.0                         Active    51285478



 

          Problem    Anal or rectal pain    569.42                        Active    09385832







ALLERGIES

No Information



ENCOUNTERS







                Encounter       Location        Date            Diagnosis

 

                          [facility]     [address] [place] [address]

                                         

 

                          [facility]     [id number] [place] [address]

                                         

 

                          [facility]     [id number] [place] [address]

                                         

 

                          [facility]     [id number] [place] [address]

                                         

 

                          [facility]     [address] [place] [address]

                                         

 

                          [facility] [place] [facility]     [address] [id number] [place] [address]

                                         

 

                          [facility] [place] [facility]     [address] [id number] [place] [address]

                                         

 

                          [facility] [place] [facility]     [address] [id number] [place] [address]

                                         

 

                          [facility] [place] [facility]     [address] [id number] [place] [address]

                                         

 

                          [facility] [place] [facility]     [address] [id number] [place] [address]

                                         

 

                          [facility] [place] [facility]     [address] [id number] [place] [address]

                                         

 

                          [facility] [place] [facility]     [address] [id number] [place] [address]

                          24 Oct, 2013               

 

                          [facility] [place] [facility]     [address] [id number] [place] [address]

                          11 Oct, 2013               

 

                          [facility] [place] [facility]     [address] [id number] [place] [address]

                          10 Oct, 2013               

 

                          [facility] [place] [facility]     [address] [id number] [place] [address]

                          10 Oct, 2013               

 

                          [facility] [place] [facility]     [address] [id number] [place] [address]

                          16 Aug, 2013               

 

                          [facility] [place] [facility]     [address] [id number] [place] [address]

                          02 Aug, 2013               

 

                          [facility] [place] [facility]     [address] [id number] [place] [address]

                                         

 

                          [facility] [place] [facility]     [address] [id number] [place] [address]

                                         

 

                          [facility] [place] [facility]     [address] [id number] [place] [address]

                                         

 

                          [facility] [place] [facility]     [address] [id number] [place] [address]

                                         

 

                          [facility]     [address] [id number] [place] [address]

                                         

 

                          [facility] [place] [facility]     [address] [id number] [place] [address]

                          31 May, 2013               

 

                          [facility] [place] [facility]     [address] [id number] [place] [address]

                          31 May, 2013               

 

                          [facility] [place] [facility]     [address] [id number] [place] [address]

                          01 May, 2013               

 

                          [facility]     [address] [id number] [place] [address]

                                         

 

                          [facility] [place] [facility]     [address] [id number] [place] [address]

                          10 Apr, 2013               

 

                          [facility]     [address] [id number] [place] [address]

                          22 Mar, 2013               

 

                          [facility] [place] [facility]     [address] [id number] [place] [address]

                          18 Mar, 2013               

 

                          [facility] [place] [facility]     [address] [id number] [place] [address]

                          05 Mar, 2013               

 

                          [facility] [place] [facility]     [address] [id number] [place] [address]

                          04 Mar, 2013               

 

                          [facility] [place] [facility]     [address] [id number] [place] [address]

                          04 Mar, 2013               

 

                          [facility] [place] [facility]     [address] [id number] [place] [address]

                                         

 

                          [facility] [place] [facility]     [address] [id number] [place] [address]

                          17 Dec, 2012               

 

                          [facility] [place] [facility]     [address] [id number] [place] [address]

                          05 Dec, 2012               

 

                          [facility] [place] [facility]     [address] [id number] [place] [address]

                          05 Dec, 2012               

 

                          [facility] [place] [facility]     [address] [id number] [place] [address]

                          03 Dec, 2012               

 

                          [facility]     [address] [id number] [place] [address]

                          03 Dec, 2012               

 

                          [facility] [place] [facility]     [address] [id number] [place] [address]

                                         

 

                          [facility] [place] [facility]     [address] [id number] [place] [address]

                                         

 

                          [facility] [place] [facility]     [address] [id number] [place] [address]

                                         

 

                          [facility] [place] [facility]     [address] [id number] [place] [address], [place] [address]

                                         

 

                          [facility] [place] [facility]     [address] [id number] [place] [address]

                                         

 

                          [facility] [place] [facility]     [address] [id number] [place] [address]

                                         

 

                          [facility] [place] [facility]     [address] [id number] [place] [address]

                                         

 

                          [facility] [place] [facility]     [address] [id number] [place] [address]

                                         

 

                          [facility] [place] [facility]     [address] [facility] [id number] [place] [address]

                                         

 

                          [facility] [place] [facility]     [address] [id number] [place] [address]

                          24 Oct, 2012               

 

                          [facility] [place] [facility]     [address] [id number] [place] [address]

                          24 Oct, 2012               

 

                          [facility] [place] [facility]     [address] [id number] [place] [address]

                          24 Sep, 2012               

 

                          [facility] [place] [facility]     [address] [id number] [place] [address]

                          19 Sep, 2012               

 

                          [facility] [place] [facility]     [address] [id number] [place] [address]

                          17 Sep, 2012               

 

                          [facility]     [address] [facility] [id number] [place] [address]

                                         

 

                          [facility]     [address] [facility] [id number] [place] [address]

                          15 Oct, 2010               







IMMUNIZATIONS

No Known Immunizations



SOCIAL HISTORY

Never Assessed



REASON FOR VISIT

[facility]



PLAN OF CARE





VITAL SIGNS





MEDICATIONS

Unknown Medications



RESULTS

No Results



PROCEDURES

No Known procedures



INSTRUCTIONS





MEDICATIONS ADMINISTERED

No Known Medications

## 2019-06-03 NOTE — XMS REPORT
Continuity of Care Document

                             Created on: 2019



JORGE LOVE

External Reference #: [id number]

: 1990

Sex: Female



Demographics







                          Address                    [address]

[place]  [address]

 

                          Home Phone                [phone] x

 

                          Preferred Language        Unknown

 

                          Marital Status            Unknown

 

                          Voodoo Affiliation     Unknown

 

                          Race                      Unknown

 

                          Ethnic Group              Unknown





Author







                          Organization              Unknown

 

                          Address                   Unknown

 

                          Phone                     [phone]



              



Allergies

      





             Active              Description              Code              Type              Severity

                Reaction              Onset              Reported/Identified              Relationship

 to Patient                             Clinical Status        

 

             Yes              CYCLOBENZAPRINE                                            UNKNOWN     

                GI PROBLEMS - NAUSEA                                                      

                                                 

 

             Yes              NO KNOWN DRUG ALLERGIES                                            UNKNOWN

                NO KNOWN DRUG ALLERG                                                 

                                                 

 

                Yes              hydromorphone HCl              [id number]              Drug Allergy  

             Mild              ITCHING                             10/15/2013               

                                                 

 

                Yes              No Known Drug Allergies              [id number]              Drug Allergy

              Unknown              N/A                             2017              

                                                 



                        



Medications

      





                Medication              Packaging              Start Date              Stop Date      

                    Route               Dosage              Sig        

 

                                      LACTATED RINGERS 1000CC IV BAG INJ                        ml          

             2018                                                     

   CONTINUOUSEVERY 0 Hour                  

 

                                                    ONDANSETRON VIAL INJ 4 MG/2CC (ZOFRAN 2CC VIAL)                     

             MG              2018                                      

                                                    PRN ONCE                  

 

                                      Hydromorphone  inj 2mg/cc vial (Dilaudid)                        MG   

             2018                                              

          PRN ONCE                  

 

                                      LACTATED RINGERS 1000CC IV BAG INJ                        ml          

             2018                                                     

   ONCE&1925                  

 

                                      LACTATED RINGERS 1000CC IV BAG INJ                        ml          

             2018                                                     

   ONCE&0                  

 

                                      Hydromorphone  inj 2mg/cc vial (Dilaudid)                        MG   

             2018                                              

          ONCE&                  

 

                                TRAMADOL TAB 50 MG (ULTRAM)                        MG                  2018                                                          ONCE&211

                  

 

                                      LACTATED RINGERS 1000CC IV BAG INJ                        ml          

             2018                                                     

   CONTINUOUSEVERY 0 Hour                  



                                



Problems

      





             Date Dx Coded              Attending              Type              Code              Diagnosis

                                        Diagnosed By        

 

                2010              YAS DIAZ PSYD                              244.9    

                          HYPOTHYROIDISM                       

 

                2010              YAS DIAZ PSYD                              278.00   

                          OBESITY                            

 

                2010              YAS DIAZ PSYD                              V25.01   

                          Oral Contraceptives                       

 

                2010              TRISTA PEACE                              244.9   

                          HYPOTHYROIDISM                       

 

                2010              TRISTA PEACE                              278.00  

                          Obesity                            

 

                2010              FRANCI PEACEIA R                              V25.01  

                          Oral Contraceptives                       

 

                2010              AGA JOHNSON MD                              244.9           

                          HYPOTHYROIDISM                       

 

                2010              AGA JOHNSON MD                              278.00          

                          Obesity                            

 

                2010              AGA JOHNSON MD                              V25.01          

                          Oral Contraceptives                       

 

             2010                                            244.9              HYPOTHYROIDISM 

                                                 

 

             2010                                            278.00              Obesity       

                                                 

 

             2010                                            V25.01              Oral Contraceptives

                                                 

 

             2010                                            244.9              HYPOTHYROIDISM 

                                                 

 

             2010                                            278.00              Obesity       

                                                 

 

             2010                                            V25.01              Oral Contraceptives

                                                 

 

             2010                                            244.9              HYPOTHYROIDISM 

                                                 

 

             2010                                            278.00              Obesity       

                                                 

 

             2010                                            V25.01              Oral Contraceptives

                                                 

 

                2010              TRISTA PEACE R                              244.9   

                          HYPOTHYROIDISM                       

 

                2010              FRANCI PEACEIA R                              278.00  

                          Obesity                            

 

                2010              FRANCI PEACEIA R                              V25.01  

                          Oral Contraceptives                       

 

                2010              ANDREW ROACH T                              244.9        

                          HYPOTHYROIDISM                       

 

                2010              ANDREW ROACH T                              278.00       

                          Obesity                            

 

                2010              ESTELITA JHA ANDREW T                              V25.01       

                          Oral Contraceptives                       

 

             2010              MOSER DO, ESTIVEN K                             244.9              

HYPOTHYROIDISM                                   

 

                2010              MOSER DO, ESTIVEN K                              278.00           

                          Obesity                            

 

                2010              MOSER DO, ESTIVEN K                              V25.01           

                          Oral Contraceptives                       

 

             2010              MOSER DO, ESTIVEN K                             244.9              

HYPOTHYROIDISM                                   

 

                2010              MOSER DO, ESTIVEN K                              278.00           

                          Obesity                            

 

                2010              MOSER DO, ESTIVEN K                              V25.01           

                          Oral Contraceptives                       

 

                2010              YAS DIAZ PSYD L                              244.9    

                          HYPOTHYROIDISM                       

 

                2010              YAS DIAZ PSYD ANN L                              278.00   

                          OBESITY                            

 

                2010              YAS DIAZ PSYD L                              V25.01   

                          Oral Contraceptives                       

 

                2010              YAS DIAZ PSYD L                              V25.49   

                          SURVEILLANCE OF OTHER CONTRACEPTIVE METHOD                       

 

                2010              TRISTA PEACE R                              V25.49  

                          SURVEILLANCE OF OTHER CONTRACEPTIVE METHOD                       

 

                2010              AGA JOHNSON MD                              V25.49          

                          SURVEILLANCE OF OTHER CONTRACEPTIVE METHOD                       

 

             2010                                            V25.49              SURVEILLANCE OF

 OTHER CONTRACEPTIVE METHOD                       

 

             2010                                            V25.49              SURVEILLANCE OF

 OTHER CONTRACEPTIVE METHOD                       

 

             2010                                            V25.49              SURVEILLANCE OF

 OTHER CONTRACEPTIVE METHOD                       

 

                2010              TRISTA PEACE                              V25.49  

                          SURVEILLANCE OF OTHER CONTRACEPTIVE METHOD                       

 

                2010              ANDREW ROACH                              V25.49       

                          SURVEILLANCE OF OTHER CONTRACEPTIVE METHOD                       

 

                2010              ESTIVEN MOSER DO K                              V25.49           

                          SURVEILLANCE OF OTHER CONTRACEPTIVE METHOD                       

 

                2010              ESTIVEN MOSER DO K                              V25.49           

                          SURVEILLANCE OF OTHER CONTRACEPTIVE METHOD                       

 

                2010              YAS DIAZ PSYD                              V25.49   

                          SURVEILLANCE OF OTHER CONTRACEPTIVE METHOD                       

 

                2011              YAS DIAZ PSYD                              611.0    

                          INFLAMMATORY DISEASE OF BREAST                       

 

                2011              TRISTA PEACE                              611.0   

                          INFLAMMATORY DISEASE OF BREAST                       

 

                2011              AGA JOHNSON MD                              611.0           

                          INFLAMMATORY DISEASE OF BREAST                       

 

             2011                                            611.0              INFLAMMATORY DISEASE

 OF BREAST                                       

 

             2011                                            611.0              INFLAMMATORY DISEASE

 OF BREAST                                       

 

             2011                                            611.0              INFLAMMATORY DISEASE

 OF BREAST                                       

 

                2011              TRISTA PEACE                              611.0   

                          INFLAMMATORY DISEASE OF BREAST                       

 

                2011              ANDREW ROACH                              611.0        

                          INFLAMMATORY DISEASE OF BREAST                       

 

             2011              ESTIVEN MOSER DO K                             611.0              

INFLAMMATORY DISEASE OF BREAST                       

 

             2011              ESTIVEN MOSER DO                             611.0              

INFLAMMATORY DISEASE OF BREAST                       

 

                2011              YAS DIAZ PSYD                              611.0    

                          INFLAMMATORY DISEASE OF BREAST                       

 

           2011                             Ot              521.00                             

         

 

           2011                             Ot              525.9                              

        

 

           2011                             Ot              787.03                             

         

 

                2011              YAS DIAZ PSYD                              278.01   

                          OBESITY, MORBID (BMI >40)                       

 

                2011              YAS DIAZ PSYD                              780.79   

                          fatigue                            

 

                2011              TRISTA PEACE                              278.01  

                          OBESITY MORBID                       

 

                2011              TRISTA PEACE                              780.79  

                          fatigue                            

 

                2011              AGA JOHNSON MD                              278.01          

                          OBESITY MORBID                       

 

                2011              AGA JOHNSON MD                              780.79          

                          fatigue                            

 

             2011                                            278.01              OBESITY MORBID

                                                 

 

             2011                                            780.79              fatigue       

                                                 

 

             2011                                            278.01              OBESITY MORBID

                                                 

 

             2011                                            780.79              fatigue       

                                                 

 

             2011                                            278.01              OBESITY MORBID

                                                 

 

             2011                                            780.79              fatigue       

                                                 

 

                2011              TRISTA PEACE R                              278.01  

                          OBESITY MORBID                       

 

                2011              TRISTA PEACE R                              780.79  

                          fatigue                            

 

                2011              ESTELITA ABHIJEETANDREW T                              278.01       

                          OBESITY MORBID                       

 

                2011              ESTELITA PARMARELIANDREW T                              780.79       

                          FATIGUE                            

 

                2011              MOSER DO ESTIVEN K                              278.01           

                          OBESITY MORBID                       

 

                2011              MOSER DO, ESTIVEN K                              780.79           

                          FATIGUE                            

 

                2011              MOSER DO, ESTIVEN K                              278.01           

                          OBESITY MORBID                       

 

                2011              MOSER DO, ESTIVEN K                              780.79           

                          FATIGUE                            

 

                2011              YAS DIAZ PSYD                              278.01   

                          OBESITY, MORBID (BMI >40)                       

 

                2011              YAS DIAZ PSYD                              780.79   

                          fatigue                            

 

                2011              YAS DIAZ PSYD                              616.10   

                          VAGINITIS VULVOVAGINITIS UNSPECIFIED                       

 

                2011              YAS DIAZ PSYD                              V25.09   

                          CONTRACEPTIVE COUNSELING                       

 

                2011              YAS DIAZ PSYD                              V72.31   

                          GYN EXAM, ROUTINE                       

 

                2011              TRISTA PEACE R                              616.10  

                          VAGINITIS VULVOVAGINITIS UNSPECIFIED                       

 

                2011              TRISTA PEACE R                              V25.09  

                          CONTRACEPTIVE COUNSELING                       

 

                2011              TRISTA PEACE                              V72.31  

                          GYN EXAM, ROUTINE                       

 

                2011              AGA JOHNSON MD                              616.10          

                          VAGINITIS VULVOVAGINITIS UNSPECIFIED                       

 

                2011              AGA JOHNSON MD                              V25.09          

                          CONTRACEPTIVE COUNSELING                       

 

                2011              AGA JOHNSON MD                              V72.31          

                          GYN EXAM, ROUTINE                       

 

             2011                                            616.10              VAGINITIS VULVOVAGINITIS

 UNSPECIFIED                                     

 

             2011                                            V25.09              CONTRACEPTIVE 

COUNSELING                                       

 

             2011                                            V72.31              GYN EXAM, ROUTINE

                                                 

 

             2011                                            616.10              VAGINITIS VULVOVAGINITIS

 UNSPECIFIED                                     

 

             2011                                            V25.09              CONTRACEPTIVE 

COUNSELING                                       

 

             2011                                            V72.31              GYN EXAM, ROUTINE

                                                 

 

             2011                                            616.10              VAGINITIS VULVOVAGINITIS

 UNSPECIFIED                                     

 

             2011                                            V25.09              CONTRACEPTIVE 

COUNSELING                                       

 

             2011                                            V72.31              GYN EXAM, ROUTINE

                                                 

 

                2011              TRISTA PEACE R                              616.10  

                          VAGINITIS VULVOVAGINITIS UNSPECIFIED                       

 

                2011              TRISTA PEACE R                              V25.09  

                          CONTRACEPTIVE COUNSELING                       

 

                2011              TRISTA PEACE R                              V72.31  

                          GYN EXAM, ROUTINE                       

 

                2011              ANDREW ROACH                              616.10       

                          VAGINITIS VULVOVAGINITIS UNSPECIFIED                       

 

                2011              ANDREW ROACH                              V25.09       

                          CONTRACEPTIVE COUNSELING                       

 

                2011              ANDREW ROACH                              V72.31       

                          GYN EXAM, ROUTINE                       

 

                2011              MOSER MARIAH MALONEA K                              616.10           

                          VAGINITIS VULVOVAGINITIS UNSPECIFIED                       

 

                2011              MOSER DO ESTIVEN K                              V25.09           

                          CONTRACEPTIVE COUNSELING                       

 

                2011              SHANTI MALONE ESTIVEN K                              V72.31           

                          GYN EXAM, ROUTINE                       

 

                2011              MARIAH MOSER DOA K                              616.10           

                          VAGINITIS VULVOVAGINITIS UNSPECIFIED                       

 

                2011              MOSER MARIAH MALONEA K                              V25.09           

                          CONTRACEPTIVE COUNSELING                       

 

                2011              MOSER MARIAH MALONEA K                              V72.31           

                          GYN EXAM, ROUTINE                       

 

                2011              YAS DIAZ PSYD                              616.10   

                          VAGINITIS VULVOVAGINITIS UNSPECIFIED                       

 

                2011              YAS DIAZ PSYD                              V25.09   

                          CONTRACEPTIVE COUNSELING                       

 

                2011              YAS DIAZ PSYD                              V72.31   

                          GYN EXAM, ROUTINE                       

 

           2011                             Ot              729.1                              

        

 

           2011                             Ot              729.5                              

        

 

           2012                             Ot              616.0                              

        

 

           2012                             Ot              623.8                              

        

 

           2012                             Ot              041.49                             

         

 

           2012                             Ot              564.00                             

         

 

           2012                             Ot              590.80                             

         

 

                2012              YAS DIAZ PSYD                              309.81   

                          AN PTSD                            

 

                2012              TRISTA PEACE                              309.81  

                          AN PTSD                            

 

                2012              ELIZABETH PAVON, AGA                              309.81          

                          AN PTSD                            

 

             2012                                            309.81              AN PTSD       

                                                 

 

             2012                                            309.81              AN PTSD       

                                                 

 

             2012                                            309.81              AN PTSD       

                                                 

 

                2012              TRISTA PEACE R                              309.81  

                          AN PTSD                            

 

                2012              ANDREW ROACH                              309.81       

                          AN PTSD                            

 

                2012              MOSER DO ESTIVEN K                              309.81           

                          AN PTSD                            

 

                2012              MOSER DO ESTIVEN K                              309.81           

                          AN PTSD                            

 

                2012              YAS DIAZ PSYD                              309.81   

                          AN PTSD                            

 

                2012              YAS DIAZ PSYD                              724.5    

                          BACK PAIN, GENERAL                       

 

                2012              YAS DIAZ PSYD                              788.1    

                          DYSURIA                            

 

                2012              TRISTA PEACE R                              724.5   

                          BACK PAIN, GENERAL                       

 

                2012              TRISTA PEACE R                              788.1   

                          pain during urination (dysuria)                       

 

                2012              AGA JOHNSON MD                              724.5           

                          BACK PAIN, GENERAL                       

 

                2012              AGA JOHNSON MD                              788.1           

                          pain during urination (dysuria)                       

 

             2012                                            724.5              BACK PAIN, GENERAL

                                                 

 

             2012                                            788.1              pain during urination

 (dysuria)                                       

 

             2012                                            724.5              BACK PAIN, GENERAL

                                                 

 

             2012                                            788.1              pain during urination

 (dysuria)                                       

 

             2012                                            724.5              BACK PAIN, GENERAL

                                                 

 

             2012                                            788.1              pain during urination

 (dysuria)                                       

 

                2012              TRISTA PEACE R                              724.5   

                          BACK PAIN, GENERAL                       

 

                2012              TRISTA PEACE R                              788.1   

                          pain during urination (dysuria)                       

 

                2012              ANDREW ROACH                              724.5        

                          BACK PAIN, GENERAL                       

 

                2012              ANDREW ROACH                              788.1        

                          pain during urination (dysuria)                       

 

             2012              MOSER DO, ESTIVEN K                             724.5              

BACK PAIN, GENERAL                               

 

             2012              MOSER DO, ESTIVEN K                             788.1              

pain during urination (dysuria)                       

 

             2012              MOSER DO, ESTIVEN K                             724.5              

BACK PAIN, GENERAL                               

 

             2012              MOSER DO, ESTIVEN K                             788.1              

pain during urination (dysuria)                       

 

                2012              YAS DIAZ PSYD                              724.5    

                          BACK PAIN, GENERAL                       

 

                2012              YAS DIAZ PSYD                              788.1    

                          DYSURIA                            

 

           2013                             Ot              276.51                             

         

 

           2013                             Ot              599.0                              

        

 

           2013                             Ot              780.4                              

        

 

                2013              TRISTA PEACE R                              789.09  

                          flank pain right                       

 

                2013              AGA JOHNSON MD                              789.09          

                          flank pain right                       

 

             2013                                            789.09              flank pain right

                                                 

 

             2013                                            789.09              flank pain right

                                                 

 

             2013                                            789.09              flank pain right

                                                 

 

                2013              TRISTA PEACE R                              789.09  

                          flank pain right                       

 

                2013              ANDREW ROACH                              789.09       

                          FLANK PAIN RIGHT                       

 

                2013              ESTIVEN MOSER DO                              789.09           

                          FLANK PAIN RIGHT                       

 

                2013              ESTIVEN MOSER DO K                              789.09           

                          FLANK PAIN RIGHT                       

 

                2013              AGA JOHNSON MD                              278.00          

                          OBESITY                            

 

                2013              AGA JOHNSON MD                              724.2           

                          BACK PAIN, LOWER                       

 

                2013              AGA JOHNSON MD                              789.05          

                          ABDOMINAL PAIN PERIUMBILIC                       

 

             2013                                            278.00              OBESITY       

                                                 

 

             2013                                            724.2              BACK PAIN, LOWER

                                                 

 

             2013                                            789.05              ABDOMINAL PAIN

 PERIUMBILIC                                     

 

             2013                                            278.00              OBESITY       

                                                 

 

             2013                                            724.2              BACK PAIN, LOWER

                                                 

 

             2013                                            789.05              ABDOMINAL PAIN

 PERIUMBILIC                                     

 

             2013                                            278.00              OBESITY       

                                                 

 

             2013                                            724.2              BACK PAIN, LOWER

                                                 

 

             2013                                            789.05              ABDOMINAL PAIN

 PERIUMBILIC                                     

 

                2013              TRISTA PEACE R                              278.00  

                          OBESITY                            

 

                2013              TRISTA PEACE R                              724.2   

                          BACK PAIN, LOWER                       

 

                2013              TRISTA PEACE R                              789.05  

                          ABDOMINAL PAIN PERIUMBILIC                       

 

                2013              ANDREW ROACH                              278.00       

                          OBESITY                            

 

                2013              ANDREW ROACH                              724.2        

                          BACK PAIN, LOWER                       

 

                2013              ANDREW ROACH                              789.05       

                          ABDOMINAL PAIN PERIUMBILIC                       

 

                2013              MARIAH MOSER DOA K                              278.00           

                          OBESITY                            

 

             2013              ESTIVEN MOSER DO                             724.2              

BACK PAIN, LOWER                                 

 

                2013              MOSER DO, ESTIVEN K                              789.05           

                          ABDOMINAL PAIN PERIUMBILIC                       

 

                2013              MOSER DO, ESTIVEN K                              278.00           

                          OBESITY                            

 

             2013              MOSER DO, ESTIVEN K                             724.2              

BACK PAIN, LOWER                                 

 

                2013              MOSER DO, ESTIVEN K                              789.05           

                          ABDOMINAL PAIN PERIUMBILIC                       

 

             2013                                            626.4              IRREGULAR MENSTRUAL

 CYCLE                                           

 

             2013                                            783.5              POLYDIPSIA     

                                                 

 

             2013                                            788.41              URINARY FREQUENCY

                                                 

 

             2013                                            626.4              IRREGULAR MENSTRUAL

 CYCLE                                           

 

             2013                                            783.5              POLYDIPSIA     

                                                 

 

             2013                                            788.41              URINARY FREQUENCY

                                                 

 

             2013                                            626.4              IRREGULAR MENSTRUAL

 CYCLE                                           

 

             2013                                            783.5              POLYDIPSIA     

                                                 

 

             2013                                            788.41              URINARY FREQUENCY

                                                 

 

                2013              TRISTA PEACE R                              626.4   

                          IRREGULAR MENSTRUAL CYCLE                       

 

                2013              TRISTA PEACE R                              783.5   

                          POLYDIPSIA                         

 

                2013              TRISTA PEACE R                              788.41  

                          URINARY FREQUENCY                       

 

                2013              ANDREW ROACH                              626.4        

                          IRREGULAR MENSTRUAL CYCLE                       

 

                2013              ANDREW ROACH                              783.5        

                          POLYDIPSIA                         

 

                2013              ANDREW ROACH                              788.41       

                          URINARY FREQUENCY                       

 

             2013              MOSER DO, ESTIVEN K                             626.4              

IRREGULAR MENSTRUAL CYCLE                        

 

             2013              MOSER DO, ESTIVEN K                             783.5              

POLYDIPSIA                                       

 

                2013              MOSER DO, ESTIVEN K                              788.41           

                          URINARY FREQUENCY                       

 

             2013              MOSER DO, ESTIVEN K                             626.4              

IRREGULAR MENSTRUAL CYCLE                        

 

             2013              MOSER DO, ESTIVEN K                             783.5              

POLYDIPSIA                                       

 

                2013              MOSER DO, ESTIVEN K                              788.41           

                          URINARY FREQUENCY                       

 

             2013                                            569.42              ANAL OR RECTAL

 PAIN                                            

 

             2013                                            625.9              PELVIC PAIN    

                                                 

 

             2013                                            569.42              ANAL OR RECTAL

 PAIN                                            

 

             2013                                            625.9              PELVIC PAIN    

                                                 

 

                2013              TRISTA PEACE R                              569.42  

                          ANAL OR RECTAL PAIN                       

 

                2013              TRISTA PEACE R                              625.9   

                          PELVIC PAIN                        

 

                2013              ANDREW ROACH                              569.42       

                          ANAL OR RECTAL PAIN                       

 

                2013              ANDREW ROACH                              625.9        

                          PELVIC PAIN                        

 

                2013              MOSER DO ESTIVEN K                              569.42           

                          ANAL OR RECTAL PAIN                       

 

             2013              MOSER DO, ESTIVEN K                             625.9              

PELVIC PAIN                                      

 

                2013              MOSER DO, ESTIVEN K                              569.42           

                          ANAL OR RECTAL PAIN                       

 

             2013              MOSER DO, ESTIVEN K                             625.9              

PELVIC PAIN                                      

 

             2013                                            786.2              COUGH          

                                                 

 

                2013              TRISTA PEACE R                              786.2   

                          COUGH                              

 

                2013              ANDREW ROACH                              786.2        

                          COUGH                              

 

             2013              MOSER DO, ESTIVEN K                             786.2              

COUGH                                            

 

             2013              MOSER DO, ESTIVEN K                             786.2              

COUGH                                            

 

                10/10/2013              TRISTA PEACE R                              599.0   

                          URINARY TRACT INFECTION                       

 

                10/10/2013              ANDREW ROACH                              599.0        

                          URINARY TRACT INFECTION                       

 

             10/10/2013              MOSER DO ESTIVEN K                             599.0              

URINARY TRACT INFECTION                          

 

             10/10/2013              MOSER DO, ESTIVEN K                             599.0              

URINARY TRACT INFECTION                          

 

             2013              MOSER DO ESTIVEN K                             782.1              

RASH AND OTHER NONSPECIFIC SKIN ERUPTION                       

 

             2013              MOSER DO ESTIVEN K                             782.1              

RASH AND OTHER NONSPECIFIC SKIN ERUPTION                       

 

                2013              MOSER DO ESTIVEN K                              V72.42           

                          PREGNANCY TEST POSITIVE RESULT                       

 

             2013              JACKELIN BYRD DO              Ot              623.8              

                                                 

 

                2013              JACKELIN BYRD DO              Ot              640.03           

                                                             

 

             2013              JACKELIN BYRD DO              Ot              696.3              

                                                 

 

                2014              CARLINE VILLA DO              Ot              338.29      

                          OTHER CHRONIC PAIN                       

 

                2014              CARLINE VILLA DO              Ot              493.90      

                          ASTHMA, UNSPECIFIED                       

 

                2014              CARLINE VILLA DO              Ot              642.33      

                          TRANS HYPERTEN-ANTEPART                       

 

                2014              CARLINE VILLA DO              Ot              648.93      

                          OTH CURR COND-ANTEPARTUM                       

 

                2014              CARLINE VILLA DO              Ot              784.0       

                          HEADACHE                           

 

                2014              CARLINE VILLA DO              Ot              V12.54      

                          PERSONAL HX OF TIA,  CEREBRAL INFARCTION                       

 

                2014              CARLINE VILLA DO              Ot              525.9       

                                                             

 

                2014              CARLINE VILLA DO              Ot              648.93      

                                                             

 

                2014              FENECH DOCARLINE              Ot              784.0       

                                                             

 

                2014              FENECH DO, CARLINE AHUJA              Ot              787.02      

                                                             

 

                2014              FENECH DO, CARLINE AHUJA              Ot              599.0       

                                                             

 

                2014              FENECH DO, CARLINE AHUJA              Ot              646.63      

                                                             

 

                2014              FENECH DO, CARLINE AHUJA              Ot              V22.1       

                                                             

 

                2014              FENECH DOCARLINE              Ot              644.03      

                                                             

 

                2014              FENECH DO, CARLINE AHUJA              Ot              654.23      

                                                             

 

                2014              FENECH DO, CARLINE AHUJA              Ot              285.1       

                                                             

 

                2014              FENECH DO, CARLINE AHUJA              Ot              648.22      

                                                             

 

                2014              FENECH DO, CARLINE AHUJA              Ot              654.21      

                                                             

 

                2014              BHAVANIECH CARLINE MALONE              Ot              V27.0       

                                                             

 

                2016              AZIZA SNELL              Ot              N83.20     

                          UNSPECIFIED OVARIAN CYSTS                       

 

                2016              AZIZA SNELL Ot              Z32.02     

                          ENCOUNTER FOR PREGNANCY TEST, RESULT NEG                       

 

                2016              AZIZA SNELL              Ot              N39.0      

                          URINARY TRACT INFECTION, SITE NOT SPECIF                       

 

                2016              AZIZA SNELL              Ot              O20.0      

                          THREATENED                        

 

                2016              AZIZA SNELL              Ot              Z3A.01     

                          LESS THAN 8 WEEKS GESTATION OF PREGNANCY                       

 

                2016              AZIZA SNELL Ot              Z87.891    

                          PERSONAL HISTORY OF NICOTINE DEPENDENCE                       

 

                2016              DAISY MALONE CARLINE AHUJA              Ot              N13.1       

                          HYDRONEPHROSIS W URETERAL STRICTURE, NEC                       

 

                2016              DAISY MALONE CARLINE S              Ot              O43.192     

                          OTHER MALFORMATION OF PLACENTA, SECOND T                       

 

                2016              DAISY MALONE CARLINE AHUJA              Ot              O99.89      

                          OTH DISEASES AND CONDITIONS COMPL PREG/C                       

 

                2016              DAISY MALONE CARLINE AHUJA              Ot              R09.89      

                          OTH SYMPTOMS AND SIGNS INVOLVING THE CIR                       

 

                2016              DAISY MALONE CARLINE AHUJA              Ot              O34.21      

                          MATERNAL CARE FOR SCAR FROM PREVIOUS ROXANA                       

 

                2016              DAISY MALONE CARLINE S              Ot              O43.192     

                          OTHER MALFORMATION OF PLACENTA, SECOND T                       

 

                2016              ADISY MALONE CARLINE AHUJA              Ot              R09.89      

                          OTH SYMPTOMS AND SIGNS INVOLVING THE CIR                       

 

                2016              DAISY MALONE CARLINE AHUJA              Ot              N13.1       

                          HYDRONEPHROSIS W URETERAL STRICTURE, NEC                       

 

                2016              FENECH DO, CARLINE S              Ot              O43.192     

                          OTHER MALFORMATION OF PLACENTA, SECOND T                       

 

                2016              FENECH DO, CARLINE S              Ot              O99.89      

                          OTH DISEASES AND CONDITIONS COMPL PREG/C                       

 

                2016              FENECH DO, CARLINE S              Ot              R09.89      

                          OTH SYMPTOMS AND SIGNS INVOLVING THE CIR                       

 

                2016              FENECH DO, CARLINE S              Ot              N13.1       

                          HYDRONEPHROSIS W URETERAL STRICTURE, NEC                       

 

                2016              FENECH DO, CARLINE S              Ot              O43.192     

                          OTHER MALFORMATION OF PLACENTA, SECOND T                       

 

                2016              FENECH DO, CARLINE S              Ot              O99.89      

                          OTH DISEASES AND CONDITIONS COMPL PREG/C                       

 

                2016              FENECH DO, CARLINE S              Ot              R09.89      

                          OTH SYMPTOMS AND SIGNS INVOLVING THE CIR                       

 

                2016              FENECH DO, CARLINE S              Ot              O34.21      

                          MATERNAL CARE FOR SCAR FROM PREVIOUS ROXANA                       

 

                2016              FENECH DO, CARLINE S              Ot              O43.192     

                          OTHER MALFORMATION OF PLACENTA, SECOND T                       

 

                2016              FENECH DO, CARLINE S              Ot              R09.89      

                          OTH SYMPTOMS AND SIGNS INVOLVING THE CIR                       

 

                10/05/2016              FENECH DO, CARLINE S              Ot              N13.1       

                          HYDRONEPHROSIS W URETERAL STRICTURE, NEC                       

 

                10/05/2016              FENECH DO, CARLINE S              Ot              O43.192     

                          OTHER MALFORMATION OF PLACENTA, SECOND T                       

 

                10/05/2016              FENECH DO, CARLINE S              Ot              O99.89      

                          OTH DISEASES AND CONDITIONS COMPL PREG/C                       

 

                10/05/2016              FENECH DO, CARLINE S              Ot              R09.89      

                          OTH SYMPTOMS AND SIGNS INVOLVING THE CIR                       

 

                10/05/2016              FENECH DO, CARLINE S              Ot              O34.21      

                          MATERNAL CARE FOR SCAR FROM PREVIOUS ROXANA                       

 

                10/05/2016              FENECH DO, CARLINE S              Ot              O43.192     

                          OTHER MALFORMATION OF PLACENTA, SECOND T                       

 

                10/05/2016              FENECH DO, CARLINE S              Ot              R09.89      

                          OTH SYMPTOMS AND SIGNS INVOLVING THE CIR                       

 

                10/05/2016              ANNA ANDERSON MD              Ot              M54.5         

                          LOW BACK PAIN                       

 

                10/05/2016              ANNA ANDERSON MD              Ot              O99.89        

                          OTH DISEASES AND CONDITIONS COMPL PREG/C                       

 

                10/05/2016              ANNA ANDERSON MD              Ot              Z3A.32        

                          32 WEEKS GESTATION OF PREGNANCY                       

 

                10/10/2016              ANNA ANDERSON MD              Ot              M54.5         

                          LOW BACK PAIN                       

 

                10/10/2016              ANNA ANDERSON MD              Ot              O99.89        

                          OTH DISEASES AND CONDITIONS COMPL PREG/C                       

 

                10/10/2016              ANNA ANDERSON MD              Ot              Z3A.32        

                          32 WEEKS GESTATION OF PREGNANCY                       

 

                10/10/2016              FENECH DO, CARLINE S              Ot              N13.1       

                          HYDRONEPHROSIS W URETERAL STRICTURE, NEC                       

 

                10/10/2016              FENECH DO, CARLINE S              Ot              O43.192     

                          OTHER MALFORMATION OF PLACENTA, SECOND T                       

 

                10/10/2016              FENECH DO, CARLINE S              Ot              O99.89      

                          OTH DISEASES AND CONDITIONS COMPL PREG/C                       

 

                10/10/2016              FENECH DO, CARLINE S              Ot              R09.89      

                          OTH SYMPTOMS AND SIGNS INVOLVING THE CIR                       

 

                10/10/2016              FENECH DO, CARLINE S              Ot              O34.21      

                          MATERNAL CARE FOR SCAR FROM PREVIOUS ROXANA                       

 

                10/10/2016              FENECH DO, CARLINE S              Ot              O43.192     

                          OTHER MALFORMATION OF PLACENTA, SECOND T                       

 

                10/10/2016              FENECH DO, CARLINE S              Ot              R09.89      

                          OTH SYMPTOMS AND SIGNS INVOLVING THE CIR                       

 

                10/12/2016              ANNA ANDERSON MD, Ot              M54.5         

                          LOW BACK PAIN                       

 

                10/12/2016              ANNA ANDERSON MD              Ot              O99.89        

                          OTH DISEASES AND CONDITIONS COMPL PREG/C                       

 

                10/12/2016              ANNA ANDERSON MD              Ot              Z3A.32        

                          32 WEEKS GESTATION OF PREGNANCY                       

 

                10/24/2016              BETSY TOVAR MD, Ot              O47.9  

                          FALSE LABOR, UNSPECIFIED                       

 

                10/24/2016              BETSY TOVAR MD, Ot.35 

                          35 WEEKS GESTATION OF PREGNANCY                       

 

                10/27/2016              BETSY TOVAR MD, Ot              O47.9  

                          FALSE LABOR, UNSPECIFIED                       

 

                10/27/2016              BETSY TOVAR MD, Ot.00 

                          WEEKS OF GESTATION OF PREGNANCY NOT SPEC                       

 

                10/27/2016              BETSY TOVAR MD, Ot              O47.9  

                          FALSE LABOR, UNSPECIFIED                       

 

                10/27/2016              BETSY TOVAR MD, Ot.35 

                          35 WEEKS GESTATION OF PREGNANCY                       

 

                2016              FENECH DO CARLINE S              Ot              O26.893     

                          OTH PREGNANCY RELATED CONDITIONS, THIRD                        

 

                2016              FENECH DO, CARLINE S              Ot              O34.211     

                          MATERN CARE FOR LOW TRANSVERSE SCAR FROM                       

 

                2016              FENECH DOCARLINE              Ot              R10.2       

                          PELVIC AND PERINEAL PAIN                       

 

                2016              BHAVANIECH DOCARLINE              Ot              Z3A.37      

                          37 WEEKS GESTATION OF PREGNANCY                       

 

                2017              DAISY DOCARLINE S              Ot              N13.1       

                          HYDRONEPHROSIS W URETERAL STRICTURE, NEC                       

 

                2017              FENECH DO, CARLINE S              Ot              O43.192     

                          OTHER MALFORMATION OF PLACENTA, SECOND T                       

 

                2017              BHAVANIECH DO, CARLINE S              Ot              O99.89      

                          OTH DISEASES AND CONDITIONS COMPL PREG/C                       

 

                2017              FENECH DO, CARLINE S              Ot              R09.89      

                          OTH SYMPTOMS AND SIGNS INVOLVING THE CIR                       

 

                2017              FENECH DO, CARLINE S              Ot              O34.21      

                          MATERNAL CARE FOR SCAR FROM PREVIOUS ROXANA                       

 

                2017              BHAVANIECH DOCARLINE S              Ot              O43.192     

                          OTHER MALFORMATION OF PLACENTA, SECOND T                       

 

                2017              BHAVANIECH DOCARLINE S              Ot              R09.89      

                          OTH SYMPTOMS AND SIGNS INVOLVING THE CIR                       

 

                2017              BHAVANIECH DOCARLINE              Ot              N80.9       

                          ENDOMETRIOSIS, UNSPECIFIED                       

 

                2017              FENECH DOCARLINE S              Ot              Z01.812     

                          ENCOUNTER FOR PREPROCEDURAL LABORATORY E                       

 

                2017              BHAVANIECH DO, CARLINE S              Ot              N80.9       

                          ENDOMETRIOSIS, UNSPECIFIED                       

 

                2017              FENECH DO, CARLINE S              Ot              Z01.812     

                          ENCOUNTER FOR PREPROCEDURAL LABORATORY E                       

 

                2017              DAISY DO, CARLINE AHUJA              Ot              N13.1       

                          HYDRONEPHROSIS W URETERAL STRICTURE, NEC                       

 

                2017              BHAVANIECH DOCARLINE S              Ot              O43.192     

                          OTHER MALFORMATION OF PLACENTA, SECOND T                       

 

                2017              BHAVANIECH DOCARLINE S              Ot              O99.89      

                          OTH DISEASES AND CONDITIONS COMPL PREG/C                       

 

                2017              BHAVANIECH DO, CARLINE S              Ot              R09.89      

                          OTH SYMPTOMS AND SIGNS INVOLVING THE CIR                       

 

                2017              FENECH DOCARLINE S              Ot              O34.21      

                          MATERNAL CARE FOR SCAR FROM PREVIOUS ROXANA                       

 

                2017              BHAVANIECH DOCARLINE S              Ot              O43.192     

                          OTHER MALFORMATION OF PLACENTA, SECOND T                       

 

                2017              FENECH DOCARLINE S              Ot              R09.89      

                          OTH SYMPTOMS AND SIGNS INVOLVING THE CIR                       

 

                2017              FENECH DOCARLINE S              Ot              N73.6       

                          FEMALE PELVIC PERITONEAL ADHESIONS (POST                       

 

                2017              FENECH DOCARLINE S              Ot              N94.5       

                          SECONDARY DYSMENORRHEA                       

 

                2017              DAISY CARLINE MALONE S              Ot              R10.2       

                          PELVIC AND PERINEAL PAIN                       

 

                2017              CARLINE VILLA DO              Ot              R33.9       

                          RETENTION OF URINE, UNSPECIFIED                       

 

                2017              CARLINE VILLA DO              Ot              Z87.42      

                          PERSONAL HISTORY OF OTH DISEASES OF THE                        

 

                2017              JACOB MALONE ADONAY              Ot              F17.210         

                          NICOTINE DEPENDENCE, CIGARETTES, UNCOMPL                       

 

                2017              JACOB DO ADONAY              Ot              K04.7           

                          PERIAPICAL ABSCESS WITHOUT SINUS                       

 

                2017              JAMES DO, ADONAY              Ot              K59.09          

                          OTHER CONSTIPATION                       

 

                2017              JAMES DO, ADONAY              Ot              L03.211         

                          CELLULITIS OF FACE                       

 

                2017              JAMES DO ADONAY              Ot              R79.89          

                          OTHER SPECIFIED ABNORMAL FINDINGS OF BLO                       

 

                2017              JACOB DO ADONAY              Ot              F17.210         

                          NICOTINE DEPENDENCE, CIGARETTES, UNCOMPL                       

 

                2017              JACOB DO ADONAY              Ot              K04.7           

                          PERIAPICAL ABSCESS WITHOUT SINUS                       

 

                2017              JAMES DO ADONAY              Ot              K59.09          

                          OTHER CONSTIPATION                       

 

                2017              JAMES DO, ADONAY              Ot              L03.211         

                          CELLULITIS OF FACE                       

 

                2017              JAMES DO ADONAY              Ot              R79.89          

                          OTHER SPECIFIED ABNORMAL FINDINGS OF BLO                       

 

                2017              BHAVANIJOSE CARLINE MALONE S              Ot              N73.6       

                          FEMALE PELVIC PERITONEAL ADHESIONS (POST                       

 

                2017              BHAVANICARLINE GARCIA DO S              Ot              N94.5       

                          SECONDARY DYSMENORRHEA                       

 

                2017              CARLINE VILLA DO S              Ot              R10.2       

                          PELVIC AND PERINEAL PAIN                       

 

                2017              CARLINE VILLA DO              Ot              R33.9       

                          RETENTION OF URINE, UNSPECIFIED                       

 

                2017              CARLINE VILLA DO              Ot              Z87.42      

                          PERSONAL HISTORY OF OTH DISEASES OF THE                        

 

                2017              CARLINE VILLA DO S              Ot              N73.6       

                          FEMALE PELVIC PERITONEAL ADHESIONS (POST                       

 

                2017              CARLINE VILLA DO S              Ot              N94.5       

                          SECONDARY DYSMENORRHEA                       

 

                2017              CARLINE VILLA DO S              Ot              R10.2       

                          PELVIC AND PERINEAL PAIN                       

 

                2017              CARLINE VILLA DO S              Ot              R33.9       

                          RETENTION OF URINE, UNSPECIFIED                       

 

                2017              CARLINE VILLA DO              Ot              Z87.42      

                          PERSONAL HISTORY OF OTH DISEASES OF THE                        

 

                2017              CARLINE VILLA DO S              Ot              N13.1       

                          HYDRONEPHROSIS W URETERAL STRICTURE, NEC                       

 

                2017              BHAVANIECH DO, CARLINE S              Ot              O43.192     

                          OTHER MALFORMATION OF PLACENTA, SECOND T                       

 

                2017              DAISY DO, CARLINE S              Ot              O99.89      

                          OTH DISEASES AND CONDITIONS COMPL PREG/C                       

 

                2017              DAISY DO CARLINE S              Ot              R09.89      

                          OTH SYMPTOMS AND SIGNS INVOLVING THE CIR                       

 

                2017              DAISY DO CARLINE S              Ot              O34.21      

                          MATERNAL CARE FOR SCAR FROM PREVIOUS ROXANA                       

 

                2017              DAISY DO, CARLINE S              Ot              O43.192     

                          OTHER MALFORMATION OF PLACENTA, SECOND T                       

 

                2017              DAISY DO CARLINE S              Ot              R09.89      

                          OTH SYMPTOMS AND SIGNS INVOLVING THE CIR                       

 

                2017              JOLANTA JAMES DOI              Ot              F17.210         

                          NICOTINE DEPENDENCE, CIGARETTES, UNCOMPL                       

 

                2017              JOLANTA JAMES DOI              Ot              K04.7           

                          PERIAPICAL ABSCESS WITHOUT SINUS                       

 

                2017              JOLANTA JAMES DOI              Ot              K59.09          

                          OTHER CONSTIPATION                       

 

                2017              JACOB MALONE ADONAY              Ot              L03.211         

                          CELLULITIS OF FACE                       

 

                2017              JACOB MALONE ADONAY              Ot              R79.89          

                          OTHER SPECIFIED ABNORMAL FINDINGS OF BLO                       

 

                06/15/2017              DAISY MALONE CARLINE S              Ot              N13.1       

                          HYDRONEPHROSIS W URETERAL STRICTURE, NEC                       

 

                06/15/2017              DAISY DO, CARLINE S              Ot              O43.192     

                          OTHER MALFORMATION OF PLACENTA, SECOND T                       

 

                06/15/2017              DAISY DO CARLINE S              Ot              O99.89      

                          OTH DISEASES AND CONDITIONS COMPL PREG/C                       

 

                06/15/2017              DAISY DO CARLINE S              Ot              R09.89      

                          OTH SYMPTOMS AND SIGNS INVOLVING THE CIR                       

 

                06/15/2017              DAISY DO CARLINE S              Ot              O34.21      

                          MATERNAL CARE FOR SCAR FROM PREVIOUS ROXANA                       

 

                06/15/2017              DAISY DO, CARLINE S              Ot              O43.192     

                          OTHER MALFORMATION OF PLACENTA, SECOND T                       

 

                06/15/2017              DAISY DOCARLINE S              Ot              R09.89      

                          OTH SYMPTOMS AND SIGNS INVOLVING THE CIR                       

 

                2017              EDELMIRA CHENG MD              Ot              M41.84      

                          OTHER FORMS OF SCOLIOSIS, THORACIC REGIO                       

 

                2017              EDELMIRA CHENG MD              Ot              M54.2       

                          CERVICALGIA                        

 

                2017              EDELMIRA CHENG MD              Ot              M54.5       

                          LOW BACK PAIN                       

 

                2017              EDELMIRA CHENG MD              Ot              M54.6       

                          PAIN IN THORACIC SPINE                       

 

                2017              EDELMIRA CHENG MD              Ot              M41.84      

                          OTHER FORMS OF SCOLIOSIS, THORACIC REGIO                       

 

                2017              EDELMIRA CHENG MD              Ot              M54.2       

                          CERVICALGIA                        

 

                2017              EDELMIRA CHENG MD              Ot              M54.5       

                          LOW BACK PAIN                       

 

                2017              EDELMIRA CHENG MD              Ot              M54.6       

                          PAIN IN THORACIC SPINE                       

 

                2017              FENECH DO, CARLINE S              Ot              N13.1       

                          HYDRONEPHROSIS W URETERAL STRICTURE, NEC                       

 

                2017              FENECH DO, CARLINE S              Ot              O43.192     

                          OTHER MALFORMATION OF PLACENTA, SECOND T                       

 

                2017              FENECH DO, CARLINE S              Ot              O99.89      

                          OTH DISEASES AND CONDITIONS COMPL PREG/C                       

 

                2017              FENECH DO, CARLINE S              Ot              R09.89      

                          OTH SYMPTOMS AND SIGNS INVOLVING THE CIR                       

 

                2017              FENECH DO, CARLINE S              Ot              O34.21      

                          MATERNAL CARE FOR SCAR FROM PREVIOUS ROXANA                       

 

                2017              FENECH DO, CARLINE S              Ot              O43.192     

                          OTHER MALFORMATION OF PLACENTA, SECOND T                       

 

                2017              FENECH DO, CARLINE S              Ot              R09.89      

                          OTH SYMPTOMS AND SIGNS INVOLVING THE CIR                       

 

                2017              EDELMIRA CHENG MD              Ot              M41.84      

                          OTHER FORMS OF SCOLIOSIS, THORACIC REGIO                       

 

                2017              EDELMIRA CHENG MD              Ot              M54.2       

                          CERVICALGIA                        

 

                2017              EDELMIRA CHENG MD              Ot              M54.5       

                          LOW BACK PAIN                       

 

                2017              EDELMIRA CHENG MD              Ot              M54.6       

                          PAIN IN THORACIC SPINE                       

 

                2017              EDELMIRA CHENG MD              Ot              M54.5       

                          LOW BACK PAIN                       

 

                2017              EDELMIRA CHENG MD              Ot              R93.7       

                          ABNORMAL FINDINGS ON DIAGNOSTIC IMAGING                        

 

                2017              EDELMIRA CHENG MD              Ot              M54.5       

                          LOW BACK PAIN                       

 

                2017              EDELMIRA CHENG MD              Ot              R93.7       

                          ABNORMAL FINDINGS ON DIAGNOSTIC IMAGING                        

 

             2018              EDELMIRA CHENG              780.79              

OTHER MALAISE AND FATIGUE                        

 

             2018              EDELMIRA CHENG              787.02              

NAUSEA ALONE                                     

 

             2018              EDELMIRA CHENG              P96.89              

OTHER SPECIFIED CONDITIONS ORIGINATING IN THE  PERIOD                     

  

 

             2018              EDELMIRA CHENG              W              R11.0              NAUSEA

                                                 

 

             2018              EDELMIRA CHENG              W              780.79              

OTHER MALAISE AND FATIGUE                        

 

             2018              EDELMIRA CHENG              W              787.02              

NAUSEA ALONE                                     

 

             2018              EDELMIRA CHENG              W              P96.89              

OTHER SPECIFIED CONDITIONS ORIGINATING IN THE  PERIOD                     

  

 

             2018              EDELMIRA CHENG              R11.0              NAUSEA

                                                 

 

             2018              EDELMIRA CHENG              W              780.79              

OTHER MALAISE AND FATIGUE                        

 

             2018              EDELMIRA CHENG              W              787.02              

NAUSEA ALONE                                     

 

             2018              EDELMIRA CHENG              W              P96.89              

OTHER SPECIFIED CONDITIONS ORIGINATING IN THE  PERIOD                     

  

 

             2018              EDELMIRA CHENG              W              R11.0              NAUSEA

                                                 

 

             2018              EDELMIRA CHENG              W              R53.83              

OTHER FATIGUE                                    

 

             01/10/2018                             W              780.79              OTHER MALAISE 

AND FATIGUE                                      

 

             01/10/2018                             W              R53.83              OTHER FATIGUE 

                                                 

 

             01/10/2018                             W              780.79              OTHER MALAISE 

AND FATIGUE                                      

 

             01/10/2018                             W              R53.83              OTHER FATIGUE 

                                                 

 

             01/10/2018                             W              780.79              OTHER MALAISE 

AND FATIGUE                                      

 

             01/10/2018                             W              R53.83              OTHER FATIGUE 

                                                 

 

                2018              CARLINE VILLA DO              Ot              N13.1       

                          HYDRONEPHROSIS W URETERAL STRICTURE, NEC                       

 

                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     

                          OTHER MALFORMATION OF PLACENTA, SECOND T                       

 

                2018              CARLINE VILLA DO              Ot              O99.89      

                          OTH DISEASES AND CONDITIONS COMPL PREG/C                       

 

                2018              CARLINE VILLA DO              Ot              R09.89      

                          OTH SYMPTOMS AND SIGNS INVOLVING THE CIR                       

 

                2018              CARLINE VILLA DO              Ot              O34.21      

                          MATERNAL CARE FOR SCAR FROM PREVIOUS ROXANA                       

 

                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     

                          OTHER MALFORMATION OF PLACENTA, SECOND T                       

 

                2018              CARLINE VILLA DO              Ot              R09.89      

                          OTH SYMPTOMS AND SIGNS INVOLVING THE CIR                       

 

                2018              EDELMIRA CHENG MD              Ot              M41.84      

                          OTHER FORMS OF SCOLIOSIS, THORACIC REGIO                       

 

                2018              EDELMIRA CHENG MD              Ot              M54.2       

                          CERVICALGIA                        

 

                2018              EDELMIRA CHENG MD              Ot              M54.5       

                          LOW BACK PAIN                       

 

                2018              PROSPER PAVON, EDELMIRA LUQUE              Ot              M54.6       

                          PAIN IN THORACIC SPINE                       

 

                2018              EDELMIRA CHENG MD              Ot              M54.5       

                          LOW BACK PAIN                       

 

                2018              EDELMIRA CHENG MD              Ot              R93.7       

                          ABNORMAL FINDINGS ON DIAGNOSTIC IMAGING                        

 

                2018              CHRISTIANA GONGORA MD              Ot              F32.9        

                          MAJOR DEPRESSIVE DISORDER, SINGLE EPISOD                       

 

                2018              CHRISTIANA GONGORA MD              Ot              F41.9        

                          ANXIETY DISORDER, UNSPECIFIED                       

 

                2018              CHRISTIANA GONGORA MD              Ot              G43.909      

                          MIGRAINE, UNSP, NOT INTRACTABLE, WITHOUT                       

 

                2018              CHRISTIANA GONGORA MD              Ot              J45.909      

                          UNSPECIFIED ASTHMA, UNCOMPLICATED                       

 

                2018              CHRISTIANA GONGORA MD              Ot              K52.9        

                          NONINFECTIVE GASTROENTERITIS AND COLITIS                       

 

                2018              CHRISTIANA GONOGRA MD              Ot              R11.10       

                          VOMITING, UNSPECIFIED                       

 

                2018              CHRISTIANA GONGORA MD              Ot              Z82.49       

                          FAMILY HX OF ISCHEM HEART DIS AND OTH DI                       

 

                2018              CHRISTIANA GONGORA MD              Ot              Z86.73       

                          PRSNL HX OF TIA (TIA), AND CEREB INFRC W                       

 

                2018              CHRISTIANA GONGORA MD              Ot              Z87.448      

                          PERSONAL HISTORY OF OTHER DISEASES OF UR                       

 

                2018              CHRISTIANA GONGORA MD              Ot              Z87.59       

                          PERSONAL HISTORY OF COMP OF PREG, CHLDBR                       

 

                2018              CHRISTIANA GONGORA MD              Ot              Z87.891      

                          PERSONAL HISTORY OF NICOTINE DEPENDENCE                       

 

                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      

                          ACQUIRED ABSENCE OF BOTH CERVIX AND UTER                       

 

                2018              CHRISTIANA GONGORA MD              Ot              F32.9        

                          MAJOR DEPRESSIVE DISORDER, SINGLE EPISOD                       

 

                2018              CHRISTIANA GONGORA MD              Ot              F41.9        

                          ANXIETY DISORDER, UNSPECIFIED                       

 

                2018              CHRISTIANA GONGORA MD              Ot              G43.909      

                          MIGRAINE, UNSP, NOT INTRACTABLE, WITHOUT                       

 

                2018              CHRISTIANA GONGORA MD              Ot              J45.909      

                          UNSPECIFIED ASTHMA, UNCOMPLICATED                       

 

                2018              CHRISTIANA GONGORA MD              Ot              K52.9        

                          NONINFECTIVE GASTROENTERITIS AND COLITIS                       

 

                2018              CHRISTIANA GONGORA MD              Ot              R11.10       

                          VOMITING, UNSPECIFIED                       

 

                2018              CHRISTIANA GONGORA MD              Ot              Z82.49       

                          FAMILY HX OF ISCHEM HEART DIS AND OTH DI                       

 

                2018              CHRISTIANA GONGORA MD              Ot              Z86.73       

                          PRSNL HX OF TIA (TIA), AND CEREB INFRC W                       

 

                2018              CHRISTIANA GONGORA MD, Ot              Z87.448      

                          PERSONAL HISTORY OF OTHER DISEASES OF UR                       

 

                2018              CHRISTIANA GONGORA MD, Ot              Z87.59       

                          PERSONAL HISTORY OF COMP OF PREG, CHLDBR                       

 

                2018              CHRISTIANA GONGORA MD              Ot              Z87.891      

                          PERSONAL HISTORY OF NICOTINE DEPENDENCE                       

 

                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      

                          ACQUIRED ABSENCE OF BOTH CERVIX AND UTER                       

 

                2018              CADENCE MATTSON              Ot              R10.11        

                          RIGHT UPPER QUADRANT PAIN                       

 

             2018              CHARO SEAY              W              620.2              OTHER

 AND UNSPECIFIED OVARIAN CYST                       

 

             2018              CHARO SEAY              W              787.01              NAUSEA

 WITH VOMITING                                   

 

             2018              CHARO SEAY              A              789.01              ABDOMINAL

 PAIN, RIGHT UPPER QUADRANT                       

 

             2018              KATERYNA SEAYUA              W              791.9              OTHER

 NONSPECIFIC FINDINGS ON EXAMINATION OF URINE                       

 

             2018              CHARO SEAY              W              N83.201              UNSPECIFIED

 OVARIAN CYST, RIGHT SIDE                        

 

             2018              KATERYNA SEAYUA              A              R10.11              RIGHT

 UPPER QUADRANT PAIN                             

 

             2018              KATERYNA SEAYUA              W              R11.2              NAUSEA

 WITH VOMITING, UNSPECIFIED                       

 

             2018              KATERYNA SEAYUA              W              R82.99              OTHER

 ABNORMAL FINDINGS IN URINE                       

 

                2018              CADENCE MATTSONP              Ot              R10.11        

                          RIGHT UPPER QUADRANT PAIN                       

 

                2018              Vance Arnold              A               530.81      

                          ESOPHAGEAL REFLUX                       

 

                2018              Vance Arnold              W               535.50      

                                        UNSPECIFIED GASTRITIS AND GASTRODUODENITIS, WITHOUT MENTION OF HEMORRHAGE  

                                                 

 

                2018              Vance Arnold              W               552.3       

                          DIAPHRAGMATIC HERNIA WITH OBSTRUCTION                       

 

                2018              Vance Arnold              A               K21.9       

                          GASTRO-ESOPHAGEAL REFLUX DISEASE WITHOUT ESOPHAGITIS                       

 

                2018              Vance Arnold              W               K29.60      

                          OTHER GASTRITIS WITHOUT BLEEDING                                             

      

 

                2018              Vance Arnold              W               K44.9       

                          DIAPHRAGMATIC HERNIA WITHOUT OBSTRUCTION OR GANGRENE                          

     

 

                2018              DAISY MALONE, CARLINE AHUJA              Ot              N13.1       

                          HYDRONEPHROSIS W URETERAL STRICTURE, NEC                       

 

                2018              DAISY DO, CARLINE AHUJA              Ot              O43.192     

                          OTHER MALFORMATION OF PLACENTA, SECOND T                       

 

                2018              DAISY MALONE, CARLINE AHUJA              Ot              O99.89      

                          OTH DISEASES AND CONDITIONS COMPL PREG/C                       

 

                2018              DAISY MALONE, CARLINE AHUJA              Ot              R09.89      

                          OTH SYMPTOMS AND SIGNS INVOLVING THE CIR                       

 

                2018              DAISY MALONE, CARLINE AHUJA              Ot              O34.21      

                          MATERNAL CARE FOR SCAR FROM PREVIOUS ROXANA                       

 

                2018              DAISY MALONE, CARLINE AHUJA              Ot              O43.192     

                          OTHER MALFORMATION OF PLACENTA, SECOND T                       

 

                2018              DAISY DO, CARLINE AHUJA              Ot              R09.89      

                          OTH SYMPTOMS AND SIGNS INVOLVING THE CIR                       

 

                2018              PROSPER PAVON, EDELMIRA LUQUE              Ot              M41.84      

                          OTHER FORMS OF SCOLIOSIS, THORACIC REGIO                       

 

                2018              PROSPER PAVON, EDELMIRA L              Ot              M54.2       

                          CERVICALGIA                        

 

                2018              PROSPER PAVON, EDELMIRA L              Ot              M54.5       

                          LOW BACK PAIN                       

 

                2018              PROSPER PAVON, EDELMIRA L              Ot              M54.6       

                          PAIN IN THORACIC SPINE                       

 

                2018              PROSPER PAVON, EDELMIRA L              Ot              M54.5       

                          LOW BACK PAIN                       

 

                2018              PROSPER PAVON, EDELMIRA L              Ot              R93.7       

                          ABNORMAL FINDINGS ON DIAGNOSTIC IMAGING                        

 

                2018              CADENCE MATTSON              Ot              R10.11        

                          RIGHT UPPER QUADRANT PAIN                       



                                                                                
                                                                                
                                                                                
                                                                                
                                                                                
                                                                                
                                                                                
                                                                                
                                                                                
                                                                                
                                                                                
                                                                                
            



Procedures

      





                Code              Description              Performed By              Performed On     

   

 

                                      04785                                    INDIV PSYTX 45/50 MIN        

                                                    2012        

 

                                      86382                                    UA W/ CULTURE IF INDICATED   

                                                    2013        

 

                                      32727                                    UA W/MICROSCOPY              

                                                    2013        

 

                                      42466                                    UA LONG DIP                  

                                                    2013        

 

                                      18415                                    XRAY ABDOMEN 2 VIEWS         

                                                    2013        

 

                                      80356                                    ROUTINE VENIPUNCTURE         

                                                    2013        

 

                                      01348                                    UA W/ CULTURE IF INDICATED   

                                                    2013        

 

                                      21463                                    URINE PREGNANCY TEST (IN-HOUSE)

                                                    2013        

 

                                87238                                    TSH                            

                                        2013        

 

                                      66370                                    A1C (IN-HOUSE)               

                                                    2013        

 

                                      08676                                    UA W/ CULTURE IF INDICATED   

                                                    2013        

 

                                      47655                                    UA W/ CULTURE IF INDICATED   

                                                    10/10/2013        

 

                                      69090                                    CULTURE URINE                

                                                    10/10/2013        

 

                                      OBSTETRIC                                    CANDICE BILL             

                                                    2013        

 

                                      55591                                    URINE PREGNANCY TEST (IN-HOUSE)

                                                    2013        

 

                                      [id number]                                    EXTRACTION OF POC, LOW CERVICAL,

 OPEN AP                                                  2016        



                                                



Results

      





                    Test                Result              Range        









                                        Complete urinalysis with reflex to culture - 10/05/16 19:40         









                    Urine color determination              YELLOW               NRG        

 

                    Urine clarity determination              SLIGHTLY CLOUDY               NRG        

 

                    Urine pH measurement by test strip              7                   5-9        

 

                    Specific gravity of urine by test strip              1.015               1.016-1.022

        

 

                          Urine protein assay by test strip, semi-quantitative              NEGATIVE        

                                        NEGATIVE        

 

                    Urine glucose detection by automated test strip              NEGATIVE               

NEGATIVE        

 

                          Erythrocytes detection in urine sediment by light microscopy              NEGATIVE

                                        NEGATIVE        

 

                    Urine ketones detection by automated test strip              NEGATIVE               

NEGATIVE        

 

                    Urine nitrite detection by test strip              NEGATIVE               NEGATIVE  

      

 

                    Urine total bilirubin detection by test strip              NEGATIVE               NEGATIVE

        

 

                          Urine urobilinogen measurement by automated test strip (mass/volume)              

4 mg/dL                                 NORMAL        

 

                    Urine leukocyte esterase detection by dipstick              2+                  NEGATIVE

        

 

                                        Automated urine sediment erythrocyte count by microscopy (number/high power field)

                          NONE                      NRG        

 

                                        Automated urine sediment leukocyte count by microscopy (number/high power field)

                           [HPF]                    NRG        

 

                          Bacteria detection in urine sediment by light microscopy              TRACE       

                                        NRG        

 

                                        Squamous epithelial cells detection in urine sediment by light microscopy       

                          10-25                     NRG        

 

                          Crystals detection in urine sediment by light microscopy              NONE        

                                        NRG        

 

                          Casts detection in urine sediment by light microscopy              NONE           

                                        NRG        

 

                          Mucus detection in urine sediment by light microscopy              NEGATIVE       

                                        NRG        

 

                    Complete urinalysis with reflex to culture              YES                 NRG       

 









                                        Bacterial urine culture - 10/05/16 19:40         









                    Bacterial urine culture              [id number]               NRG        

 

                    COLONY COUNT              >100,000/ML               NRG        

 

                    FTX;REPORTABLE              PLUS MIXED GRAM POSITIVES               NRG        

 

                    FREE TEXT ENTRY 2              <10,000/ML               NRG        









                                        Complete urinalysis with reflex to culture - 10/24/16 16:00         









                    Urine color determination              YELLOW               NRG        

 

                    Urine clarity determination              SLIGHTLY CLOUDY               NRG        

 

                    Urine pH measurement by test strip              6                   5-9        

 

                    Specific gravity of urine by test strip              1.025               1.016-1.022

        

 

                    Urine protein assay by test strip, semi-quantitative              2+                  NEGATIVE

        

 

                    Urine glucose detection by automated test strip              NEGATIVE               

NEGATIVE        

 

                          Erythrocytes detection in urine sediment by light microscopy              NEGATIVE

                                        NEGATIVE        

 

                    Urine ketones detection by automated test strip              4+                  NEGATIVE

        

 

                    Urine nitrite detection by test strip              NEGATIVE               NEGATIVE  

      

 

                    Urine total bilirubin detection by test strip              NEGATIVE               NEGATIVE

        

 

                          Urine urobilinogen measurement by automated test strip (mass/volume)              

4 mg/dL                                 NORMAL        

 

                    Urine leukocyte esterase detection by dipstick              2+                  NEGATIVE

        

 

                                        Automated urine sediment erythrocyte count by microscopy (number/high power field)

                          NONE                      NRG        

 

                                        Automated urine sediment leukocyte count by microscopy (number/high power field)

                           [HPF]                    NRG        

 

                          Bacteria detection in urine sediment by light microscopy              LARGE       

                                        NRG        

 

                                        Squamous epithelial cells detection in urine sediment by light microscopy       

                          25-50                     NRG        

 

                          Crystals detection in urine sediment by light microscopy              NONE        

                                        NRG        

 

                          Casts detection in urine sediment by light microscopy              NONE           

                                        NRG        

 

                          Mucus detection in urine sediment by light microscopy              LARGE          

                                        NRG        

 

                    Complete urinalysis with reflex to culture              YES                 NRG       

 









                                        Urine drug screening test - 10/24/16 16:00         









                          Urine phencyclidine detection by screening method              NEGATIVE           

                                        NEGATIVE        

 

                          Urine benzodiazepines detection by screening method              NEGATIVE         

                                        NEGATIVE        

 

                    Urine cocaine detection              NEGATIVE               NEGATIVE        

 

                          Urine amphetamines detection by screening method              NEGATIVE            

                                        NEGATIVE        

 

                          Urine methamphetamine detection by screening method              NEGATIVE         

                                        NEGATIVE        

 

                          Urine cannabinoids detection by screening method              NEGATIVE            

                                        NEGATIVE        

 

                    Urine opiates detection by screening method              NEGATIVE               NEGATIVE

        

 

                    Urine barbiturates detection              POSITIVE               NEGATIVE        

 

                          Screening urine tricyclic antidepressants detection              NEGATIVE         

                                        NEGATIVE        

 

                    Urine methadone detection by screening method              NEGATIVE               NEGATIVE

        

 

                    Urine oxycodone detection              NEGATIVE               NEGATIVE        

 

                    Urine propoxyphene detection              NEGATIVE               NEGATIVE        

 

                    Urine buprenophrine screen              NEGATIVE               NEGATIVE        









                                        Bacterial urine culture - 10/24/16 16:00         









                    URINE CULTURE RESULTS              10,000/ML - 100,000/ML               NRG        









                                        Complete blood count (CBC) with automated white blood cell (WBC) differential - 

16 15:55         









                          Blood leukocytes automated count (number/volume)              11.7 10*3/uL        

                                        4.3-11.0        

 

                          Blood erythrocytes automated count (number/volume)              3.87 10*6/uL      

                                        4.35-5.85        

 

                          Venous blood hemoglobin measurement (mass/volume)              11.2 g/dL          

                                        11.5-16.0        

 

                    Blood hematocrit (volume fraction)              33 %                35-52        

 

                    Automated erythrocyte mean corpuscular volume              85 [foz_us]              

80-99        

 

                                        Automated erythrocyte mean corpuscular hemoglobin (mass per erythrocyte)        

                          29 pg                     25-34        

 

                                        Automated erythrocyte mean corpuscular hemoglobin concentration measurement (mass/volume)

                          34 g/dL                   32-36        

 

                    Automated erythrocyte distribution width ratio              12.3 %              10.0-

14.5        

 

                    Automated blood platelet count (count/volume)              314 10*3/uL              

130-400        

 

                          Automated blood platelet mean volume measurement              9.9 [foz_us]        

                                        7.4-10.4        

 

                    Automated blood neutrophils/100 leukocytes              74 %                42-75     

   

 

                    Automated blood lymphocytes/100 leukocytes              18 %                12-44     

   

 

                    Blood monocytes/100 leukocytes              7 %                 0-12        

 

                    Automated blood eosinophils/100 leukocytes              1 %                 0-10       

 

 

                    Automated blood basophils/100 leukocytes              0 %                 0-10        

 

                          Blood neutrophils automated count (number/volume)              8.7 10*3           

                                        1.8-7.8        

 

                          Blood lymphocytes automated count (number/volume)              2.2 10*3           

                                        1.0-4.0        

 

                    Blood monocytes automated count (number/volume)              0.8 10*3              0.0-

1.0        

 

                    Automated eosinophil count              0.1 10*3/uL              0.0-0.3        

 

                    Automated blood basophil count (count/volume)              0.0 10*3/uL              

0.0-0.1        









                                        Blood type T Indirect antibody screen panel - 16 15:55         









                    ABO+Rh group              AN                  NRG        

 

                    Transfusion band number              [id number]               NRG        

 

                    Blood group antibody screen              NEGATIVE               NRG        









                                        Complete blood count (CBC) with automated white blood cell (WBC) differential - 

16 05:45         









                          Blood leukocytes automated count (number/volume)              12.6 10*3/uL        

                                        4.3-11.0        

 

                          Blood erythrocytes automated count (number/volume)              3.55 10*6/uL      

                                        4.35-5.85        

 

                          Venous blood hemoglobin measurement (mass/volume)              10.2 g/dL          

                                        11.5-16.0        

 

                    Blood hematocrit (volume fraction)              30 %                35-52        

 

                    Automated erythrocyte mean corpuscular volume              85 [foz_us]              

80-99        

 

                                        Automated erythrocyte mean corpuscular hemoglobin (mass per erythrocyte)        

                          29 pg                     25-34        

 

                                        Automated erythrocyte mean corpuscular hemoglobin concentration measurement (mass/volume)

                          34 g/dL                   32-36        

 

                    Automated erythrocyte distribution width ratio              12.4 %              10.0-

14.5        

 

                    Automated blood platelet count (count/volume)              276 10*3/uL              

130-400        

 

                          Automated blood platelet mean volume measurement              9.9 [foz_us]        

                                        7.4-10.4        

 

                    Automated blood neutrophils/100 leukocytes              76 %                42-75     

   

 

                    Automated blood lymphocytes/100 leukocytes              17 %                12-44     

   

 

                    Blood monocytes/100 leukocytes              7 %                 0-12        

 

                    Automated blood eosinophils/100 leukocytes              1 %                 0-10       

 

 

                    Automated blood basophils/100 leukocytes              0 %                 0-10        

 

                          Blood neutrophils automated count (number/volume)              9.5 10*3           

                                        1.8-7.8        

 

                          Blood lymphocytes automated count (number/volume)              2.1 10*3           

                                        1.0-4.0        

 

                    Blood monocytes automated count (number/volume)              0.9 10*3              0.0-

1.0        

 

                    Automated eosinophil count              0.1 10*3/uL              0.0-0.3        

 

                    Automated blood basophil count (count/volume)              0.0 10*3/uL              

0.0-0.1        









                                        RH IMMUNE GLOBULIN [facility] - 16 05:45         









                          RH IMMUNE GLOBULIN [facility]                             PRSMD [id number]    [id number]

                                        NRG        









                                        Fetal cell screen - 16 05:45         









                    FETAL SCREEN LOT NUMBER              [id number]               NRG        

 

                    Transfusion band number              [id number]               NRG        

 

                    [id number]              1 300ug              NRG        

 

                    Erythrocytes.fetal/1000 erythrocytes              16               NRG        



 

                    Fetal cell screen              18               NRG        

 

                    Fetal cell screen              NEGATIVE               NEGATIVE        

 

                    Lot number              [id number]               NRG        









                                        Complete blood count (CBC) with automated white blood cell (WBC) differential - 

17 10:35         









                          Blood leukocytes automated count (number/volume)              6.6 10*3/uL         

                                        4.3-11.0        

 

                          Blood erythrocytes automated count (number/volume)              4.68 10*6/uL      

                                        4.35-5.85        

 

                          Venous blood hemoglobin measurement (mass/volume)              13.4 g/dL          

                                        11.5-16.0        

 

                    Blood hematocrit (volume fraction)              39 %                35-52        

 

                    Automated erythrocyte mean corpuscular volume              84 [foz_us]              

80-99        

 

                                        Automated erythrocyte mean corpuscular hemoglobin (mass per erythrocyte)        

                          29 pg                     25-34        

 

                                        Automated erythrocyte mean corpuscular hemoglobin concentration measurement (mass/volume)

                          34 g/dL                   32-36        

 

                    Automated erythrocyte distribution width ratio              13.1 %              10.0-

14.5        

 

                    Automated blood platelet count (count/volume)              340 10*3/uL              

130-400        

 

                          Automated blood platelet mean volume measurement              9.7 [foz_us]        

                                        7.4-10.4        

 

                    Automated blood neutrophils/100 leukocytes              63 %                42-75     

   

 

                    Automated blood lymphocytes/100 leukocytes              30 %                12-44     

   

 

                    Blood monocytes/100 leukocytes              6 %                 0-12        

 

                    Automated blood eosinophils/100 leukocytes              2 %                 0-10       

 

 

                    Automated blood basophils/100 leukocytes              0 %                 0-10        

 

                          Blood neutrophils automated count (number/volume)              4.2 10*3           

                                        1.8-7.8        

 

                          Blood lymphocytes automated count (number/volume)              2.0 10*3           

                                        1.0-4.0        

 

                    Blood monocytes automated count (number/volume)              0.4 10*3              0.0-

1.0        

 

                    Automated eosinophil count              0.1 10*3/uL              0.0-0.3        

 

                    Automated blood basophil count (count/volume)              0.0 10*3/uL              

0.0-0.1        









                                        Blood type T Indirect antibody screen panel - 17 10:35         









                    ABO+Rh group              AN                  NRG        

 

                    Transfusion band number              TNP                 NRG        

 

                    Blood group antibody screen              NEGATIVE               NRG        









                                        Methicillin resistant Staphylococcus aureus (MRSA) screening culture - 17 

10:35         









                          Methicillin resistant Staphylococcus aureus (MRSA) screening culture              

NEG                                     NRG        









                                        Urine beta human chorionic gonadotropin (hCG) measurement - 17 06:18      

   









                          Urine beta human chorionic gonadotropin (hCG) measurement              NEGATIVE   

                                        NEGATIVE        









                                        Blood type T Indirect antibody screen panel - 17 06:25         









                    ABO+Rh group              AN                  NRG        

 

                    Transfusion band number              [id number]               NRG        

 

                    Blood group antibody screen              NEGATIVE               NRG        









                                        Complete blood count (CBC) with automated white blood cell (WBC) differential - 

17 10:45         









                          Blood leukocytes automated count (number/volume)              8.6 10*3/uL         

                                        4.3-11.0        

 

                          Blood erythrocytes automated count (number/volume)              4.23 10*6/uL      

                                        4.35-5.85        

 

                          Venous blood hemoglobin measurement (mass/volume)              12.1 g/dL          

                                        11.5-16.0        

 

                    Blood hematocrit (volume fraction)              37 %                35-52        

 

                    Automated erythrocyte mean corpuscular volume              87 [foz_us]              

80-99        

 

                                        Automated erythrocyte mean corpuscular hemoglobin (mass per erythrocyte)        

                          29 pg                     25-34        

 

                                        Automated erythrocyte mean corpuscular hemoglobin concentration measurement (mass/volume)

                          33 g/dL                   32-36        

 

                    Automated erythrocyte distribution width ratio              13.3 %              10.0-

14.5        

 

                    Automated blood platelet count (count/volume)              270 10*3/uL              

130-400        

 

                          Automated blood platelet mean volume measurement              9.1 [foz_us]        

                                        7.4-10.4        

 

                    Automated blood neutrophils/100 leukocytes              70 %                42-75     

   

 

                    Automated blood lymphocytes/100 leukocytes              21 %                12-44     

   

 

                    Blood monocytes/100 leukocytes              7 %                 0-12        

 

                    Automated blood eosinophils/100 leukocytes              3 %                 0-10       

 

 

                    Automated blood basophils/100 leukocytes              0 %                 0-10        

 

                          Blood neutrophils automated count (number/volume)              6.0 10*3           

                                        1.8-7.8        

 

                          Blood lymphocytes automated count (number/volume)              1.8 10*3           

                                        1.0-4.0        

 

                    Blood monocytes automated count (number/volume)              0.6 10*3              0.0-

1.0        

 

                    Automated eosinophil count              0.2 10*3/uL              0.0-0.3        

 

                    Automated blood basophil count (count/volume)              0.0 10*3/uL              

0.0-0.1        









                                        Comprehensive metabolic panel - 17 10:45         









                          Serum or plasma sodium measurement (moles/volume)              138 mmol/L         

                                        135-145        

 

                          Serum or plasma potassium measurement (moles/volume)              4.0 mmol/L      

                                        3.6-5.0        

 

                          Serum or plasma chloride measurement (moles/volume)              106 mmol/L       

                                                

 

                    Carbon dioxide              24 mmol/L              21-32        

 

                          Serum or plasma anion gap determination (moles/volume)              8 mmol/L      

                                        5-14        

 

                          Serum or plasma urea nitrogen measurement (mass/volume)              5 mg/dL      

                                        7-18        

 

                          Serum or plasma creatinine measurement (mass/volume)              0.75 mg/dL      

                                        0.60-1.30        

 

                    Serum or plasma urea nitrogen/creatinine mass ratio              7                   NRG

        

 

                                        Serum or plasma creatinine measurement with calculation of estimated glomerular 

filtration rate              >                         NRG        

 

                          Serum or plasma glucose measurement (mass/volume)              84 mg/dL           

                                                

 

                          Serum or plasma calcium measurement (mass/volume)              8.4 mg/dL          

                                        8.5-10.1        

 

                          Serum or plasma total bilirubin measurement (mass/volume)              0.4 mg/dL  

                                        0.1-1.0        

 

                                        Serum or plasma alkaline phosphatase measurement (enzymatic activity/volume)    

                          67 U/L                            

 

                                        Serum or plasma aspartate aminotransferase measurement (enzymatic activity/volume)

                          44 U/L                    5-34        

 

                                        Serum or plasma alanine aminotransferase measurement (enzymatic activity/volume)

                          76 U/L                    0-55        

 

                          Serum or plasma protein measurement (mass/volume)              6.4 g/dL           

                                        6.4-8.2        

 

                          Serum or plasma albumin measurement (mass/volume)              3.5 g/dL           

                                        3.2-4.5        









                                        Complete blood count (CBC) with automated white blood cell (WBC) differential - 

17 05:08         









                          Blood leukocytes automated count (number/volume)              6.6 10*3/uL         

                                        4.3-11.0        

 

                          Blood erythrocytes automated count (number/volume)              3.99 10*6/uL      

                                        4.35-5.85        

 

                          Venous blood hemoglobin measurement (mass/volume)              11.5 g/dL          

                                        11.5-16.0        

 

                    Blood hematocrit (volume fraction)              35 %                35-52        

 

                    Automated erythrocyte mean corpuscular volume              87 [foz_us]              

80-99        

 

                                        Automated erythrocyte mean corpuscular hemoglobin (mass per erythrocyte)        

                          29 pg                     25-34        

 

                                        Automated erythrocyte mean corpuscular hemoglobin concentration measurement (mass/volume)

                          33 g/dL                   32-36        

 

                    Automated erythrocyte distribution width ratio              13.1 %              10.0-

14.5        

 

                    Automated blood platelet count (count/volume)              279 10*3/uL              

130-400        

 

                          Automated blood platelet mean volume measurement              9.8 [foz_us]        

                                        7.4-10.4        

 

                    Automated blood neutrophils/100 leukocytes              62 %                42-75     

   

 

                    Automated blood lymphocytes/100 leukocytes              29 %                12-44     

   

 

                    Blood monocytes/100 leukocytes              6 %                 0-12        

 

                    Automated blood eosinophils/100 leukocytes              3 %                 0-10       

 

 

                    Automated blood basophils/100 leukocytes              0 %                 0-10        

 

                          Blood neutrophils automated count (number/volume)              4.1 10*3           

                                        1.8-7.8        

 

                          Blood lymphocytes automated count (number/volume)              1.9 10*3           

                                        1.0-4.0        

 

                    Blood monocytes automated count (number/volume)              0.4 10*3              0.0-

1.0        

 

                    Automated eosinophil count              0.2 10*3/uL              0.0-0.3        

 

                    Automated blood basophil count (count/volume)              0.0 10*3/uL              

0.0-0.1        









                                        Comprehensive metabolic panel - 17 05:08         









                          Serum or plasma sodium measurement (moles/volume)              138 mmol/L         

                                        135-145        

 

                          Serum or plasma potassium measurement (moles/volume)              4.3 mmol/L      

                                        3.6-5.0        

 

                          Serum or plasma chloride measurement (moles/volume)              105 mmol/L       

                                                

 

                    Carbon dioxide              26 mmol/L              21-32        

 

                          Serum or plasma anion gap determination (moles/volume)              7 mmol/L      

                                        5-14        

 

                          Serum or plasma urea nitrogen measurement (mass/volume)              5 mg/dL      

                                        7-18        

 

                          Serum or plasma creatinine measurement (mass/volume)              0.70 mg/dL      

                                        0.60-1.30        

 

                    Serum or plasma urea nitrogen/creatinine mass ratio              7                   NRG

        

 

                                        Serum or plasma creatinine measurement with calculation of estimated glomerular 

filtration rate              >                         NRG        

 

                          Serum or plasma glucose measurement (mass/volume)              83 mg/dL           

                                                

 

                          Serum or plasma calcium measurement (mass/volume)              8.6 mg/dL          

                                        8.5-10.1        

 

                          Serum or plasma total bilirubin measurement (mass/volume)              0.2 mg/dL  

                                        0.1-1.0        

 

                                        Serum or plasma alkaline phosphatase measurement (enzymatic activity/volume)    

                          77 U/L                            

 

                                        Serum or plasma aspartate aminotransferase measurement (enzymatic activity/volume)

                          45 U/L                    5-34        

 

                                        Serum or plasma alanine aminotransferase measurement (enzymatic activity/volume)

                          61 U/L                    0-55        

 

                          Serum or plasma protein measurement (mass/volume)              6.2 g/dL           

                                        6.4-8.2        

 

                          Serum or plasma albumin measurement (mass/volume)              3.2 g/dL           

                                        3.2-4.5        









                                        Acute hepatitis panel - 17 05:08         









                                        Confirmatory quantitative serum or plasma hepatitis B virus surface antigen measurement

                          Non-Reactive               Non-Reactive        

 

                    Hepatitis A virus IgM antibody assay              Non-Reactive               Non-Reactive

        

 

                    Hepatitis B virus core IgM antibody assay              Non-Reactive               Non-

Reactive        

 

                    Serum hepatitis C virus antibody detection              Non-Reactive               Non-

Reactive        









                                        Thyroid Stimulating Hormone - 18 13:16         









                    TSH                 5.20 mIU/mL              0.32-5.00        









                                        Complete urinalysis with reflex to culture - 18 13:48         









                    Urine color determination              YELLOW               NRG        

 

                    Urine clarity determination              SLIGHTLY CLOUDY               NRG        

 

                    Urine pH measurement by test strip              8                   5-9        

 

                    Specific gravity of urine by test strip              1.015               1.016-1.022

        

 

                          Urine protein assay by test strip, semi-quantitative              NEGATIVE        

                                        NEGATIVE        

 

                    Urine glucose detection by automated test strip              NEGATIVE               

NEGATIVE        

 

                          Erythrocytes detection in urine sediment by light microscopy              NEGATIVE

                                        NEGATIVE        

 

                    Urine ketones detection by automated test strip              NEGATIVE               

NEGATIVE        

 

                    Urine nitrite detection by test strip              NEGATIVE               NEGATIVE  

      

 

                    Urine total bilirubin detection by test strip              NEGATIVE               NEGATIVE

        

 

                          Urine urobilinogen measurement by automated test strip (mass/volume)              

NORMAL                                  NORMAL        

 

                    Urine leukocyte esterase detection by dipstick              NEGATIVE               NEGATIVE

        

 

                                        Automated urine sediment erythrocyte count by microscopy (number/high power field)

                          NONE                      NRG        

 

                                        Automated urine sediment leukocyte count by microscopy (number/high power field)

                          NONE                      NRG        

 

                          Bacteria detection in urine sediment by light microscopy              NEGATIVE    

                                        NRG        

 

                                        Squamous epithelial cells detection in urine sediment by light microscopy       

                          5-10                      NRG        

 

                          Crystals detection in urine sediment by light microscopy              PRESENT     

                                        NRG        

 

                          Casts detection in urine sediment by light microscopy              NONE           

                                        NRG        

 

                          Mucus detection in urine sediment by light microscopy              NEGATIVE       

                                        NRG        

 

                    Complete urinalysis with reflex to culture              NO                  NRG        



 

                          Amorphous sediment detection in urine sediment by light microscopy              MOD

  PHOSPHATE                         NRG        









                                        Urine beta human chorionic gonadotropin (hCG) measurement - 18 13:48      

   









                          Urine beta human chorionic gonadotropin (hCG) measurement              NEGATIVE   

                                        NEGATIVE        









                                        Complete blood count (CBC) with automated white blood cell (WBC) differential - 

18 14:25         









                          Blood leukocytes automated count (number/volume)              6.5 10*3/uL         

                                        4.3-11.0        

 

                          Blood erythrocytes automated count (number/volume)              4.31 10*6/uL      

                                        4.35-5.85        

 

                          Venous blood hemoglobin measurement (mass/volume)              13.1 g/dL          

                                        11.5-16.0        

 

                    Blood hematocrit (volume fraction)              38 %                35-52        

 

                    Automated erythrocyte mean corpuscular volume              89 [foz_us]              

80-99        

 

                                        Automated erythrocyte mean corpuscular hemoglobin (mass per erythrocyte)        

                          30 pg                     25-34        

 

                                        Automated erythrocyte mean corpuscular hemoglobin concentration measurement (mass/volume)

                          34 g/dL                   32-36        

 

                    Automated erythrocyte distribution width ratio              11.9 %              10.0-

14.5        

 

                    Automated blood platelet count (count/volume)              300 10*3/uL              

130-400        

 

                          Automated blood platelet mean volume measurement              9.1 [foz_us]        

                                        7.4-10.4        

 

                    Automated blood neutrophils/100 leukocytes              54 %                42-75     

   

 

                    Automated blood lymphocytes/100 leukocytes              37 %                12-44     

   

 

                    Blood monocytes/100 leukocytes              6 %                 0-12        

 

                    Automated blood eosinophils/100 leukocytes              3 %                 0-10       

 

 

                    Automated blood basophils/100 leukocytes              0 %                 0-10        

 

                          Blood neutrophils automated count (number/volume)              3.5 10*3           

                                        1.8-7.8        

 

                          Blood lymphocytes automated count (number/volume)              2.4 10*3           

                                        1.0-4.0        

 

                    Blood monocytes automated count (number/volume)              0.4 10*3              0.0-

1.0        

 

                    Automated eosinophil count              0.2 10*3/uL              0.0-0.3        

 

                    Automated blood basophil count (count/volume)              0.0 10*3/uL              

0.0-0.1        









                                        Comprehensive metabolic panel - 18 14:25         









                          Serum or plasma sodium measurement (moles/volume)              138 mmol/L         

                                        135-145        

 

                          Serum or plasma potassium measurement (moles/volume)              3.8 mmol/L      

                                        3.6-5.0        

 

                          Serum or plasma chloride measurement (moles/volume)              112 mmol/L       

                                                

 

                    Carbon dioxide              22 mmol/L              21-32        

 

                          Serum or plasma anion gap determination (moles/volume)              4 mmol/L      

                                        5-14        

 

                          Serum or plasma urea nitrogen measurement (mass/volume)              11 mg/dL     

                                        7-18        

 

                          Serum or plasma creatinine measurement (mass/volume)              0.73 mg/dL      

                                        0.60-1.30        

 

                    Serum or plasma urea nitrogen/creatinine mass ratio              15                  NRG

        

 

                                        Serum or plasma creatinine measurement with calculation of estimated glomerular 

filtration rate              >                         NRG        

 

                          Serum or plasma glucose measurement (mass/volume)              99 mg/dL           

                                                

 

                          Serum or plasma calcium measurement (mass/volume)              8.9 mg/dL          

                                        8.5-10.1        

 

                          Serum or plasma total bilirubin measurement (mass/volume)              0.5 mg/dL  

                                        0.1-1.0        

 

                                        Serum or plasma alkaline phosphatase measurement (enzymatic activity/volume)    

                          47 U/L                            

 

                                        Serum or plasma aspartate aminotransferase measurement (enzymatic activity/volume)

                          17 U/L                    5-34        

 

                                        Serum or plasma alanine aminotransferase measurement (enzymatic activity/volume)

                          13 U/L                    0-55        

 

                          Serum or plasma protein measurement (mass/volume)              7.3 g/dL           

                                        6.4-8.2        

 

                          Serum or plasma albumin measurement (mass/volume)              3.9 g/dL           

                                        3.2-4.5        









                                        Comprehensive Metabolic Panel - 18 19:25         









                    Albumin              3.8 g/dL              3.6-5.1        

 

                    ALP                 63 U/L                      

 

                    ALT                 23 U/L              6-45        

 

                    Anion Gap              13                  6-14        

 

                    AST                 25 U/L              2-40        

 

                    BUN                 13 mg/dL              5-25        

 

                    Calcium              8.8 mg/dL              8.3-10.4        

 

                    Chloride              109 mmol/L                      

 

                    CO2                 21 mEq/L              22-33        

 

                    Creat               0.77 mg/dL              0.50-1.50        

 

                    eGFR                89 mL/min/1.73m2              >59        

 

                    Globulin              3.3 g/dL              2.3-3.5        

 

                    Glucose              90 mg/dL                      

 

                    Osmo                287                 280-295        

 

                    Potassium              3.8 mmol/L              3.5-5.3        

 

                    Sodium              139 mmol/L              134-148        

 

                    TBil                0.3 mg/dL              0.2-1.2        

 

                    TP                  7.1 g/dL              6.0-8.3        









                                        Lipase - 18 19:25         









                    Lipase              47 U/L              7-59        









                                        Urinalysis - 18 19:25         









                    Icotest              N/A                 Negative        

 

                    Urine Volume              Urine Volume Sufficient (10mL)                        

 

                    Urine Yeast              No Yeast present                        

 

                    Urine-Appearance              Slightly Cloudy               Clear        

 

                    Urine-Bacteria              2+                           

 

                    Urine-Bilirubin              Negative               Negative        

 

                    Urine-Blood              Trace-intact               Negative        

 

                    Urine-Color              Yellow               Colorless-Lt. Yellow        

 

                    Urine-Epithelial Cells              TNTC                         

 

                    Urine-Glucose              Negative               Negative        

 

                    Urine-Ketones              Negative               Negative        

 

                    Urine-Leukocytes              1+                  Negative        

 

                    Urine-Mucus              3+                           

 

                    Urine-Nitrite              Negative               Negative        

 

                    Urine-Other              Trichomonas                        

 

                    Urine-pH              5.5                 5-8.5        

 

                    Urine-Protein              1+                  Negative        

 

                    Urine-RBC              2-5/HPF                        

 

                    Urine-Specific Gravity              >=1.030               1.000-1.030        

 

                    Urine-WBC              5-10/HPF                        

 

                    Urobilinogen              0.2                 0.2-1.0        









                                        Urine Culture - 18 19:25         









                    PRELIM CULTURE RESULTS              No Growth 24 hours                        

 

                          FINAL CULTURE RESULTS              <10,000 Mixed Aysha Probable Skin Contaminant No

 Further Workup done                             

 

                    MEDIA PLATED              Setup at 21:22 on 2018                        

 

                    CULTURE SOURCE              VOID                         









                                        Protime  - 18 07:27         









                    INR                 1.0                 1.0-4.0        

 

                    Protime              11.8 Sec              9.9-12.8        









                                        Surgical Pathology - 18 08:31         









                    Surg Path              Sent to [facility]                        









                                        Complete blood count (CBC) with automated white blood cell (WBC) differential - 

19 19:48         









                          Blood leukocytes automated count (number/volume)              12.4 10*3/uL        

                                        4.3-11.0        

 

                          Blood erythrocytes automated count (number/volume)              4.61 10*6/uL      

                                        4.35-5.85        

 

                          Venous blood hemoglobin measurement (mass/volume)              13.6 g/dL          

                                        11.5-16.0        

 

                    Blood hematocrit (volume fraction)              39 %                35-52        

 

                    Automated erythrocyte mean corpuscular volume              85 [foz_us]              

80-99        

 

                                        Automated erythrocyte mean corpuscular hemoglobin (mass per erythrocyte)        

                          30 pg                     25-34        

 

                                        Automated erythrocyte mean corpuscular hemoglobin concentration measurement (mass/volume)

                          35 g/dL                   32-36        

 

                    Automated erythrocyte distribution width ratio              12.3 %              10.0-

14.5        

 

                    Automated blood platelet count (count/volume)              335 10*3/uL              

130-400        

 

                          Automated blood platelet mean volume measurement              9.2 [foz_us]        

                                        7.4-10.4        

 

                    Automated blood neutrophils/100 leukocytes              65 %                42-75     

   

 

                    Automated blood lymphocytes/100 leukocytes              25 %                12-44     

   

 

                    Blood monocytes/100 leukocytes              9 %                 0-12        

 

                    Automated blood eosinophils/100 leukocytes              1 %                 0-10       

 

 

                    Automated blood basophils/100 leukocytes              0 %                 0-10        

 

                          Blood neutrophils automated count (number/volume)              8.1 10*3           

                                        1.8-7.8        

 

                          Blood lymphocytes automated count (number/volume)              3.1 10*3           

                                        1.0-4.0        

 

                    Blood monocytes automated count (number/volume)              1.1 10*3              0.0-

1.0        

 

                    Automated eosinophil count              0.1 10*3/uL              0.0-0.3        

 

                    Automated blood basophil count (count/volume)              0.0 10*3/uL              

0.0-0.1        









                                        Comprehensive metabolic panel - 19 19:48         









                          Serum or plasma sodium measurement (moles/volume)              139 mmol/L         

                                        135-145        

 

                          Serum or plasma potassium measurement (moles/volume)              3.4 mmol/L      

                                        3.6-5.0        

 

                          Serum or plasma chloride measurement (moles/volume)              107 mmol/L       

                                                

 

                    Carbon dioxide              19 mmol/L              21-32        

 

                          Serum or plasma anion gap determination (moles/volume)              13 mmol/L     

                                        5-14        

 

                          Serum or plasma urea nitrogen measurement (mass/volume)              19 mg/dL     

                                        7-18        

 

                          Serum or plasma creatinine measurement (mass/volume)              1.06 mg/dL      

                                        0.60-1.30        

 

                    Serum or plasma urea nitrogen/creatinine mass ratio              18                  NRG

        

 

                                        Serum or plasma creatinine measurement with calculation of estimated glomerular 

filtration rate              >                         NRG        

 

                          Serum or plasma glucose measurement (mass/volume)              87 mg/dL           

                                                

 

                          Serum or plasma calcium measurement (mass/volume)              9.7 mg/dL          

                                        8.5-10.1        

 

                          Serum or plasma total bilirubin measurement (mass/volume)              1.4 mg/dL  

                                        0.1-1.0        

 

                                        Serum or plasma alkaline phosphatase measurement (enzymatic activity/volume)    

                          69 U/L                            

 

                                        Serum or plasma aspartate aminotransferase measurement (enzymatic activity/volume)

                          20 U/L                    5-34        

 

                                        Serum or plasma alanine aminotransferase measurement (enzymatic activity/volume)

                          15 U/L                    0-55        

 

                          Serum or plasma protein measurement (mass/volume)              7.8 g/dL           

                                        6.4-8.2        

 

                          Serum or plasma albumin measurement (mass/volume)              4.2 g/dL           

                                        3.2-4.5        

 

                    CALCIUM CORRECTED              9.5 mg/dL              8.5-10.1        









                                        Serum or plasma C reactive protein measurement (mass/volume) - 19 19:48   

      









                          Serum or plasma C reactive protein measurement (mass/volume)              0.20 mg/dL

                                        0.00-0.50        









                                        Serum or plasma salicylates measurement (mass/volume) - 19 19:48         









                          Serum or plasma salicylates measurement (mass/volume)              < mg/dL        

                                        5.0-20.0        









                                        Serum or plasma acetaminophen measurement (mass/volume) - 19 19:48        

 









                          Serum or plasma acetaminophen measurement (mass/volume)              < ug/mL      

                                        10-30        









                                        Serum or plasma ethanol measurement (mass/volume) - 19 19:48         









                    Serum or plasma ethanol measurement (mass/volume)              < mg/dL              

<10        









                                        Complete urinalysis with reflex to culture - 19 20:28         









                    Urine color determination              IVAN               NRG        

 

                    Urine clarity determination              CLEAR               NRG        

 

                    Urine pH measurement by test strip              6                   5-9        

 

                    Specific gravity of urine by test strip              1.020               1.016-1.022

        

 

                    Urine protein assay by test strip, semi-quantitative              2+                  NEGATIVE

        

 

                    Urine glucose detection by automated test strip              NEGATIVE               

NEGATIVE        

 

                          Erythrocytes detection in urine sediment by light microscopy              NEGATIVE

                                        NEGATIVE        

 

                    Urine ketones detection by automated test strip              2+                  NEGATIVE

        

 

                    Urine nitrite detection by test strip              NEGATIVE               NEGATIVE  

      

 

                    Urine total bilirubin detection by test strip              NEGATIVE               NEGATIVE

        

 

                          Urine urobilinogen measurement by automated test strip (mass/volume)              

NORMAL                                  NORMAL        

 

                    Urine leukocyte esterase detection by dipstick              1+                  NEGATIVE

        

 

                                        Automated urine sediment erythrocyte count by microscopy (number/high power field)

                          NONE                      NRG        

 

                                        Automated urine sediment leukocyte count by microscopy (number/high power field)

                           [HPF]                    NRG        

 

                          Bacteria detection in urine sediment by light microscopy              FEW         

                                        NRG        

 

                                        Squamous epithelial cells detection in urine sediment by light microscopy       

                          5-10                      NRG        

 

                          Crystals detection in urine sediment by light microscopy              NONE        

                                        NRG        

 

                          Casts detection in urine sediment by light microscopy              NONE           

                                        NRG        

 

                          Mucus detection in urine sediment by light microscopy              LARGE          

                                        NRG        

 

                    Complete urinalysis with reflex to culture              YES                 NRG       

 









                                        Urine drug screening test - 19 20:28         









                          Urine phencyclidine detection by screening method              NEGATIVE           

                                        NEGATIVE        

 

                          Urine benzodiazepines detection by screening method              NEGATIVE         

                                        NEGATIVE        

 

                    Urine cocaine detection              NEGATIVE               NEGATIVE        

 

                          Urine amphetamines detection by screening method              POSITIVE            

                                        NEGATIVE        

 

                          Urine methamphetamine detection by screening method              POSITIVE         

                                        NEGATIVE        

 

                          Urine cannabinoids detection by screening method              NEGATIVE            

                                        NEGATIVE        

 

                    Urine opiates detection by screening method              NEGATIVE               NEGATIVE

        

 

                    Urine barbiturates detection              POSITIVE               NEGATIVE        

 

                          Screening urine tricyclic antidepressants detection              NEGATIVE         

                                        NEGATIVE        

 

                    Urine methadone detection by screening method              NEGATIVE               NEGATIVE

        

 

                    Urine oxycodone detection              NEGATIVE               NEGATIVE        

 

                    Urine propoxyphene detection              NEGATIVE               NEGATIVE        



                                                                                
            



Encounters

      





                ACCT No.              Visit Date/Time              Discharge              Status      

                Pt. Type              Provider              Facility              Loc./Unit      

                                        Complaint        

 

                [id number]              2013 15:28:00              2013 23:59:59              

[facility]              Outpatient              ESTIVEN MOSER DO                                 

                                                 

 

                [id number]              2013 08:32:00              2013 23:59:59              

[facility]              Outpatient              ESTIVEN MOSER DO                                 

                                                 

 

                [id number]              2013 15:18:00              2013 23:59:59              

[facility]                 Outpatient              ANDREW ROACH                          

                                                             

 

                [id number]              10/10/2013 13:29:00              10/10/2013 23:59:59              

[facility]                 Outpatient              TRISTA PEACE                     

                                                             

 

                [id number]              2013 10:18:00              2013 23:59:59              

CLS              Outpatient              AGA JOHNSON MD                                

                                                 

 

                [id number]              2013 15:17:00              2013 23:59:59              

CLS                 Outpatient              TRISTA PEACE                     

                                                             

 

                [id number]              2012 14:55:00              2012 23:59:59              

CLS                 Outpatient              [facility]                      

                                                             

 

                [id number]              10/24/2012 15:07:00              10/24/2012 23:59:59              CLS

                    Outpatient              [facility]                         

                                                             

 

             [id number]              2013 10:28:00                                            Document

 Registration                                                                       

 

             [id number]              2013 13:27:00                                            Document

 Registration                                                                       

 

             [id number]              2013 14:33:00                                            Document

 Registration                                                                       

 

                    [id number]              2018 08:48:00              2018 23:59:59      

                CLS              Outpatient              CADENCE MATTSON              [facility]              CARD                      RUQ ABD PAIN        

 

                    [id number]              2018 13:43:00              2018 16:30:00      

                DIS              Emergency              ROLAN PAVON, CHRISTIANA BUENO              [facility]              ER                        RIGHT SIDE PAIN DIARRHEA VOMITING 

FEVER        

 

                    [id number]              2017 09:05:00              2017 23:59:59      

                CLS              Outpatient              EDELMIRA CHENG MD              [facility]              RAD                       LUMBAR PAIN W/RADICULOPATHY      

  

 

                    [id number]              06/15/2017 14:23:00              06/15/2017 23:59:59      

                CLS              Outpatient              EDELMIRA CHENG MD              [facility]              RAD                       CERVICAL THORACIC AND LUMBAR PAIN

        

 

                    [id number]              2017 09:00:00              2017 23:59:59      

                CLS              [facility]              ADONAY JAMES DO              [facility]              RAD                       R10.11        

 

                    [id number]              2017 13:29:00              2017 08:33:00      

                DIS              Inpatient              ADONAY JAMES DO              [facility]              4TH                       CELLULITIS FACE/DENTAL ABSCESS   

     

 

                    [id number]              2017 06:07:00              2017 13:48:00      

                DIS              Outpatient              CARLINE VILLA DO              [facility]              SDC                       CPP;ENDOMETRIOSIS        

 

                    [id number]              2017 10:05:00              2017 11:46:00      

                DIS              Outpatient              CARLINE VILLA DO              [facility]              PREOP                     CPP;ENDOMETRIOSIS        

 

                    [id number]              2016 15:41:00              2016 13:10:00      

                DIS              Inpatient              CARLINE VILLA DO              [facility]              LDRP                      REPEAT         

 

                    [id number]              10/24/2016 15:17:00              10/24/2016 17:30:00      

                DIS              Outpatient              BETSY TOVAR MD              [facility]              WSo                       CONTRACTIONS        

 

                    [id number]              10/05/2016 19:26:00              10/05/2016 20:55:00      

                DIS              Outpatient              JUSTIN PAVON, ANNA BENITEZ              [facility]              WSo                       CONTRACTIONS        

 

                    [id number]              2016 08:15:00              2016 23:59:59      

                CLS              Outpatient              CARLNIE VILLA DO              [facility]              RAD                       ABDOMINAL PAIN IN PREGNANCY      

  

 

                    [id number]              2016 13:32:00              2016 23:59:59      

                CLS              Outpatient              CARLINE VILLA DO              [facility]              RAD                       ABDOMINAL PAIN, PALPABLE ABDOMINAL

 AORTA        

 

                    [id number]              04/10/2016 22:37:00              2016 00:58:00      

                DIS              Emergency              AZIZA SNELL              [facility]              ER                        ABD PAIN/BLEEDING 7 WEEKS PREGNANT

        

 

                    [id number]              2016 09:04:00              2016 23:59:59      

                CLS              Outpatient              KELLIE ADAMS              [facility]              OCC                                

 

                    [id number]              2016 11:19:00              2016 15:04:00      

                DIS              Emergency              AZIZA SNELL              [facility]              ER                        ABD/BACK PAIN;POSSIBLY PREGNANT   

     

 

                    [id number]              2014 20:30:00              2014 14:20:00      

                DIS              Inpatient              CARLINE VILLA DO              [facility]              LDRP                               

 

                    [id number]              2014 22:12:00              2014 00:40:00      

                DIS              Outpatient              CARLINE VILLA DO              [facility]                                

 

                    [id number]              2014 13:52:00              2014 14:35:00      

                DIS              Outpatient              CARLINE VILLA DO              [facility]                                

 

                    [id number]              2014 19:09:00              2014 22:05:00      

                DIS              Outpatient              CARLINE VILLA DO              [facility]                                

 

                    [id number]              2014 14:48:00              2014 16:35:00      

                DIS              Outpatient              CARLINE VILLA DO              [facility]                                

 

                    [id number]              2014 15:40:00              2014 18:30:00      

                DIS              Inpatient              CAR[facility]ILLA DO              [facility]        

 

                    [id number]              2013 01:11:00              2013 03:23:00      

                DIS              Emergency              JACKELIN BYRD DO              [facility]              ER                                 

 

                    [id number]              10/14/2013 23:50:00              10/16/2013 13:40:00      

           DIS              Inpatient                                                           

         

 

                    [id number]              2013 19:33:00              2013 23:59:59      

             CLS              Outpatient                                                        

                                                 

 

                [id number]              2019 19:18:00                              ACT         

                    Emergency              CATHY GREER MD              [facility]                 ER                        PASSED OUT        

 

                [id number]              2019 16:53:00                              ACT         

                    Emergency              CHARO LORENZO MD              [facility]               ER                        ASSAULT        

 

                [id number]              2016 16:20:00                                          

             Document Registration                                                                   

 

 

                [id number]              2016 11:18:00                                          

             Document Registration                                                                   

 

 

                [id number]              2013 20:51:00                                          

             Document Registration                                                                   

 

 

                [id number]              2012 18:40:00                                          

             Document Registration                                                                   

 

 

                [id number]              2012 17:48:00                                          

             Document Registration                                                                   

 

 

                [id number]              2011 11:04:00                                          

             Document Registration                                                                   

 

 

                [id number]              2011 19:14:00                                          

             Document Registration                                                                   

 

 

                [id number]              2018 06:07:00              2018 09:17:00              

DIS                 Outpatient              Vance Arnold                         

                                                             

 

                [id number]              2018 12:55:00              2018 23:59:00              

DIS              Outpatient              EDELMIRA CHENG                                  

                                                 

 

                [id number]              2018 19:00:00              2018 23:13:00              

DIS              Outpatient              CHARO SEAY                                     

                                                 

 

                [id number]              2018 00:00:00              2018 23:59:00              

DIS                 Outpatient              Vance Arnold                         

                                                             

 

                [id number]              2018 13:15:00              2018 23:59:00              

DIS              Outpatient              EDELMIRA CHENG                                  

                                                 

 

             [id number]              2018 08:22:34                                            Document

 Registration                                                                       

 

             [id number]              01/10/2018 06:33:04                                            Document

 Registration

## 2019-06-03 NOTE — XMS REPORT
[facility]

                             Created on: 2019



Lisset Mabry

External Reference #: [id number]

: 1990

Sex: Female



Demographics







                          Address                    [address] [place] [address]  [id number]

 

                          Preferred Language        Unknown

 

                          Marital Status            Unknown

 

                          Shinto Affiliation     Unknown

 

                          Race                      Unknown

 

                          Ethnic Group              Unknown





Author







                          Author                    Migration,  Doctor

 

                          Organization              [facility]

 

                          Address                   Unknown

 

                          Phone                     Unavailable







Care Team Providers







                    Care Team Member Name    Role                Phone

 

                    Migration,  Doctor    Unavailable         Unavailable







PROBLEMS







          Type      Condition    ICD9-CM Code    PFO74-IC Code    Onset Dates    Condition Status    SNOMED

 Code

 

          Problem    Pregnancy examination or test, positive result    V72.42                        Active    151219873



 

          Problem    Abdominal pain, periumbilic    789.05                        Active    882398144

 

          Problem    Abdominal pain, other specified site    789.09                        Active    72557076

 

          Problem    Dysuria    788.1                         Active    98006398

 

          Problem    Urinary frequency    788.41                        Active    731802998

 

          Problem    Rash and other nonspecific skin eruption    782.1                         Active    842577005



 

          Problem    Lumbago    724.2                         Active    786639247

 

          Problem    Unspecified backache    724.5                         Active    864985009

 

          Problem    Posttraumatic stress disorder    309.81                        Active    91524058

 

          Problem    Polydipsia    783.5                         Active    29973005

 

          Problem    Obesity, unspecified    278.00                        Active    350637115

 

          Problem    Cough     786.2                         Active    04931654

 

          Problem    Irregular menstrual cycle    626.4                         Active    81313799

 

             Problem      Unspecified symptom associated with female genital organs    625.9                       

                          Active                    [id number]

 

          Problem    Urinary tract infection, site not specified    599.0                         Active    97563636



 

          Problem    Anal or rectal pain    569.42                        Active    78862784







ALLERGIES

No Information



ENCOUNTERS







                Encounter       Location        Date            Diagnosis

 

                          [facility]     [address] [place] [address]

                                         

 

                          [facility]     [id number] [place] [address]

                                         

 

                          [facility]     [id number] [place] [address]

                                         

 

                          [facility]     [id number] [place] [address]

                                         

 

                          [facility]     [address] [place] [address]

                                         

 

                          [facility] [place] [facility]     [address] [id number] [place] [address]

                                         

 

                          [facility] [place] [facility]     [address] [id number] [place] [address]

                                         

 

                          [facility] [place] [facility]     [address] [id number] [place] [address]

                                         

 

                          [facility] [place] [facility]     [address] [id number] [place] [address]

                                         

 

                          [facility] [place] [facility]     [address] [id number] [place] [address]

                                         

 

                          [facility] [place] [facility]     [address] [id number] [place] [address]

                                         

 

                          [facility] [place] [facility]     [address] [id number] [place] [address]

                          24 Oct, 2013               

 

                          [facility] [place] [facility]     [address] [id number] [place] [address]

                          11 Oct, 2013               

 

                          [facility] [place] [facility]     [address] [id number] [place] [address]

                          10 Oct, 2013               

 

                          [facility] [place] [facility]     [address] [id number] [place] [address]

                          10 Oct, 2013               

 

                          [facility] [place] [facility]     [address] [id number] [place] [address]

                          16 Aug, 2013               

 

                          [facility] [place] [facility]     [address] [id number] [place] [address]

                          02 Aug, 2013               

 

                          [facility] [place] [facility]     [address] [id number] [place] [address]

                                         

 

                          [facility] [place] [facility]     [address] [id number] [place] [address]

                                         

 

                          [facility] [place] [facility]     [address] [id number] [place] [address]

                                         

 

                          [facility] [place] [facility]     [address] [id number] [place] [address]

                                         

 

                          [facility]     [address] [id number] [place] [address]

                                         

 

                          [facility] [place] [facility]     [address] [id number] [place] [address]

                          31 May, 2013               

 

                          [facility] [place] [facility]     [address] [id number] [place] [address]

                          31 May, 2013               

 

                          [facility] [place] [facility]     [address] [id number] [place] [address]

                          01 May, 2013               

 

                          [facility]     [address] [id number] [place] [address]

                                         

 

                          [facility] [place] [facility]     [address] [id number] [place] [address]

                          10 Apr, 2013               

 

                          [facility]     [address] [id number] [place] [address]

                          22 Mar, 2013               

 

                          [facility] [place] [facility]     [address] [id number] [place] [address]

                          18 Mar, 2013               

 

                          [facility] [place] [facility]     [address] [id number] [place] [address]

                          05 Mar, 2013               

 

                          [facility] [place] [facility]     [address] [id number] [place] [address]

                          04 Mar, 2013               

 

                          [facility] [place] [facility]     [address] [id number] [place] [address]

                          04 Mar, 2013               

 

                          [facility] [place] [facility]     [address] [id number] [place] [address]

                                         

 

                          [facility] [place] [facility]     [address] [id number] [place] [address]

                          17 Dec, 2012               

 

                          [facility] [place] [facility]     [address] [id number] [place] [address]

                          05 Dec, 2012               

 

                          [facility] [place] [facility]     [address] [id number] [place] [address]

                          05 Dec, 2012               

 

                          [facility] [place] [facility]     [address] [id number] [place] [address]

                          03 Dec, 2012               

 

                          [facility]     [address] [id number] [place] [address]

                          03 Dec, 2012               

 

                          [facility] [place] [facility]     [address] [id number] [place] [address]

                                         

 

                          [facility] [place] [facility]     [address] [id number] [place] [address]

                                         

 

                          [facility] [place] [facility]     [address] [id number] [place] [address]

                                         

 

                          [facility] [place] [facility]     [id number] [place] [address], [place] [address]

                                         

 

                          [facility] [place] [facility]     [address] [id number] [place] [address]

                                         

 

                          [facility] [place] [facility]     [address] [id number] [place] [address]

                                         

 

                          [facility] [place] [facility]     [address] [id number] [place] [address]

                                         

 

                          [facility] [place] [facility]     [address] [id number] [place] [address]

                                         

 

                          [facility] [place] [facility]     [address] [facility] [id number] [place] [address]

                                         

 

                          [facility] [place] [facility]     [address] [id number] [place] [address]

                          24 Oct, 2012               

 

                          [facility] [place] [facility]     [address] [id number] [place] [address]

                          24 Oct, 2012               

 

                          [facility] [place] [facility]     [address] [id number] [place] [address]

                          24 Sep, 2012               

 

                          [facility] [place] [facility]     [address] [id number] [place] [address]

                          19 Sep, 2012               

 

                          [facility] [place] [facility]     [address] [id number] [place] [address]

                          17 Sep, 2012               

 

                          [facility]     [address] [facility] [id number] [place] [address]

                                         

 

                          [facility]     [address] [facility] [id number] [place] [address]

                          15 Oct, 2010               







IMMUNIZATIONS

No Known Immunizations



SOCIAL HISTORY

Never Assessed



REASON FOR VISIT

[facility]



PLAN OF CARE





VITAL SIGNS





MEDICATIONS

Unknown Medications



RESULTS

No Results



PROCEDURES

No Known procedures



INSTRUCTIONS





MEDICATIONS ADMINISTERED

No Known Medications

## 2019-06-03 NOTE — XMS REPORT
[facility]

                             Created on: 2019



Lisset Mabry

External Reference #: [id number]

: 1990

Sex: Female



Demographics







                          Address                    [address] [place] [address]  [address]

 

                          Preferred Language        Unknown

 

                          Marital Status            Unknown

 

                          Muslim Affiliation     Unknown

 

                          Race                      Unknown

 

                          Ethnic Group              Unknown





Author







                          Author                    Migration,  Doctor

 

                          Organization              [facility]

 

                          Address                   Unknown

 

                          Phone                     Unavailable







Care Team Providers







                    Care Team Member Name    Role                Phone

 

                    Migration,  Doctor    Unavailable         Unavailable







PROBLEMS







          Type      Condition    ICD9-CM Code    ZVW52-MX Code    Onset Dates    Condition Status    SNOMED

 Code

 

          Problem    Pregnancy examination or test, positive result    V72.42                        Active    137654354



 

          Problem    Abdominal pain, periumbilic    789.05                        Active    [id number]

 

          Problem    Abdominal pain, other specified site    789.09                        Active    69768352

 

          Problem    Dysuria    788.1                         Active    48539439

 

          Problem    Urinary frequency    788.41                        Active    072946370

 

          Problem    Rash and other nonspecific skin eruption    782.1                         Active    776412362



 

          Problem    Lumbago    724.2                         Active    945553508

 

          Problem    Unspecified backache    724.5                         Active    470095566

 

          Problem    Posttraumatic stress disorder    309.81                        Active    06148108

 

          Problem    Polydipsia    783.5                         Active    96301614

 

          Problem    Obesity, unspecified    278.00                        Active    836429432

 

          Problem    Cough     786.2                         Active    13045828

 

          Problem    Irregular menstrual cycle    626.4                         Active    80827066

 

             Problem      Unspecified symptom associated with female genital organs    625.9                       

                          Active                    208928617

 

          Problem    Urinary tract infection, site not specified    599.0                         Active    10993194



 

          Problem    Anal or rectal pain    569.42                        Active    66679156







ALLERGIES

No Information



ENCOUNTERS







                Encounter       Location        Date            Diagnosis

 

                          [facility]     [address] [place] [address]

                                         

 

                          [facility]     [id number] [place] [address]

                                         

 

                          [facility]     [id number] [place] [address]

                                         

 

                          [facility]     [id number] [place] [address]

                                         

 

                          [facility]     [address] [place] [address]

                                         

 

                          [facility] [place] [facility]     [address] [id number] [place] [address]

                                         

 

                          [facility] [place] [facility]     [address] [id number] [place] [address]

                                         

 

                          [facility] [place] [facility]     [address] [id number] [place] [address]

                                         

 

                          [facility] [place] [facility]     [address] [id number] [place] [address]

                                         

 

                          [facility] [place] [facility]     [address] [id number] [place] [address]

                                         

 

                          [facility] [place] [facility]     [address] [id number] [place] [address]

                                         

 

                          [facility] [place] [facility]     [address] [id number] [place] [address]

                          24 Oct, 2013               

 

                          [facility] [place] [facility]     [address] [id number] [place] [address]

                          11 Oct, 2013               

 

                          [facility] [place] [facility]     [address] [id number] [place] [address]

                          10 Oct, 2013               

 

                          [facility] [place] [facility]     [address] [id number] [place] [address]

                          10 Oct, 2013               

 

                          [facility] [place] [facility]     [address] [id number] [place] [address]

                          16 Aug, 2013               

 

                          [facility] [place] [facility]     [address] [id number] [place] [address]

                          02 Aug, 2013               

 

                          [facility] [place] [facility]     [address] [id number] [place] [address]

                                         

 

                          [facility] [place] [facility]     [address] [id number] [place] [address]

                                         

 

                          [facility] [place] [facility]     [address] [id number] [place] [address]

                                         

 

                          [facility] [place] [facility]     [address] [id number] [place] [address]

                                         

 

                          [facility]     [address] [id number] [place] [address]

                                         

 

                          [facility] [place] [facility]     [address] [id number] [place] [address]

                          31 May, 2013               

 

                          [facility] [place] [facility]     [address] [id number] [place] [address]

                          31 May, 2013               

 

                          [facility] [place] [facility]     [address] [id number] [place] [address]

                          01 May, 2013               

 

                          [facility]     [address] [id number] [place] [address]

                                         

 

                          [facility] [place] [facility]     [address] [id number] [place] [address]

                          10 Apr, 2013               

 

                          [facility]     [address] [id number] [place] [address]

                          22 Mar, 2013               

 

                          [facility] [place] [facility]     [address] [id number] [place] [address]

                          18 Mar, 2013               

 

                          [facility] [place] [facility]     [address] [id number] [place] [address]

                          05 Mar, 2013               

 

                          [facility] [place] [facility]     [address] [id number] [place] [address]

                          04 Mar, 2013               

 

                          [facility] [place] [facility]     [address] [id number] [place] [address]

                          04 Mar, 2013               

 

                          [facility] [place] [facility]     [address] [id number] [place] [address]

                                         

 

                          [facility] [place] [facility]     [address] [id number] [place] [address]

                          17 Dec, 2012               

 

                          [facility] [place] [facility]     [address] [id number] [place] [address]

                          05 Dec, 2012               

 

                          [facility] [place] [facility]     [address] [id number] [place] [address]

                          05 Dec, 2012               

 

                          [facility] [place] [facility]     [address] [id number] [place] [address]

                          03 Dec, 2012               

 

                          [facility]     [address] [id number] [place] [address]

                          03 Dec, 2012               

 

                          [facility] [place] [facility]     [address] [id number] [place] [address]

                                         

 

                          [facility] [place] [facility]     [address] [id number] [place] [address]

                                         

 

                          [facility] [place] [facility]     [address] [id number] [place] [address]

                                         

 

                          [facility] [place] [facility]     [id number] [place] [address], [place] [address]

                                         

 

                          [facility] [place] [facility]     [address] [id number] [place] [address]

                                         

 

                          [facility] [place] [facility]     [address] [id number] [place] [address]

                                         

 

                          [facility] [place] [facility]     [address] [id number] [place] [address]

                                         

 

                          [facility] [place] [facility]     [address] [id number] [place] [address]

                                         

 

                          [facility] [place] [facility]     [address] [facility] [id number] [place] [address]

                                         

 

                          [facility] [place] [facility]     [address] [id number] [place] [address]

                          24 Oct, 2012               

 

                          [facility] [place] [facility]     [address] [id number] [place] [address]

                          24 Oct, 2012               

 

                          [facility] [place] [facility]     [address] [id number] [place] [address]

                          24 Sep, 2012               

 

                          [facility] [place] [facility]     [address] [id number] [place] [address]

                          19 Sep, 2012               

 

                          [facility] [place] [facility]     [address] [id number] [place] [address]

                          17 Sep, 2012               

 

                          [facility]     [address] [facility] [id number] [place] [address]

                                         

 

                          [facility]     [address] [facility] [id number] [place] [address]

                          15 Oct, 2010               







IMMUNIZATIONS

No Known Immunizations



SOCIAL HISTORY

Never Assessed



REASON FOR VISIT

[facility]



[place] C[place]





VITAL SIGNS





MEDICATIONS

Unknown Medications



RESULTS

No Results



PROCEDURES

No Known procedures



INSTRUCTIONS





MEDICATIONS ADMINISTERED

No Known Medications

## 2019-06-03 NOTE — XMS REPORT
[facility]

                             Created on: 2019



Lisset Mabry

External Reference #: [id number]

: 1990

Sex: Female



Demographics







                          Address                    [address] [place] [address]

 

                          Preferred Language        Unknown

 

                          Marital Status            Unknown

 

                          Advent Affiliation     Unknown

 

                          Race                      Unknown

 

                          Ethnic Group              Unknown





Author







                          Author                    Migration,  Doctor

 

                          Organization              [facility]

 

                          Address                   Unknown

 

                          Phone                     Unavailable







Care Team Providers







                    Care Team Member Name    Role                Phone

 

                    Migration,  Doctor    Unavailable         Unavailable







PROBLEMS







          Type      Condition    ICD9-CM Code    KLE54-VK Code    Onset Dates    Condition Status    SNOMED

 Code

 

          Problem    Pregnancy examination or test, positive result    V72.42                        Active    910532684



 

          Problem    Abdominal pain, periumbilic    789.05                        Active    937712526

 

          Problem    Abdominal pain, other specified site    789.09                        Active    49432107

 

          Problem    Dysuria    788.1                         Active    38966805

 

          Problem    Urinary frequency    788.41                        Active    145081423

 

          Problem    Rash and other nonspecific skin eruption    782.1                         Active    429791345



 

          Problem    Lumbago    724.2                         Active    629885554

 

          Problem    Unspecified backache    724.5                         Active    662366840

 

          Problem    Posttraumatic stress disorder    309.81                        Active    97990976

 

          Problem    Polydipsia    783.5                         Active    52507015

 

          Problem    Obesity, unspecified    278.00                        Active    488363126

 

          Problem    Cough     786.2                         Active    38976464

 

          Problem    Irregular menstrual cycle    626.4                         Active    15358712

 

             Problem      Unspecified symptom associated with female genital organs    625.9                       

                          Active                    [id number]

 

          Problem    Urinary tract infection, site not specified    599.0                         Active    99598059



 

          Problem    Anal or rectal pain    569.42                        Active    18649975







ALLERGIES

No Information



ENCOUNTERS







                Encounter       Location        Date            Diagnosis

 

                          [facility]     [address] [place] [address]

                                         

 

                          [facility]     [id number] [place] [address]

                                         

 

                          [facility]     [id number] [place] [address]

                                         

 

                          [facility]     [id number] [place] [address]

                                         

 

                          [facility]     [address] [place] [address]

                                         

 

                          [facility] [place] [facility]     [address] [id number] [place] [address]

                                         

 

                          [facility] [place] [facility]     [address] [id number] [place] [address]

                                         

 

                          [facility] [place] [facility]     [address] [id number] [place] [address]

                                         

 

                          [facility] [place] [facility]     [address] [id number] [place] [address]

                                         

 

                          [facility] [place] [facility]     [address] [id number] [place] [address]

                                         

 

                          [facility] [place] [facility]     [address] [id number] [place] [address]

                                         

 

                          [facility] [place] [facility]     [address] [id number] [place] [address]

                          24 Oct, 2013               

 

                          [facility] [place] [facility]     [address] [id number] [place] [address]

                          11 Oct, 2013               

 

                          [facility] [place] [facility]     [address] [id number] [place] [address]

                          10 Oct, 2013               

 

                          [facility] [place] [facility]     [address] [id number] [place] [address]

                          10 Oct, 2013               

 

                          [facility] [place] [facility]     [address] [id number] [place] [address]

                          16 Aug, 2013               

 

                          [facility] [place] [facility]     [address] [id number] [place] [address]

                          02 Aug, 2013               

 

                          [facility] [place] [facility]     [address] [id number] [place] [address]

                                         

 

                          [facility] [place] [facility]     [address] [id number] [place] [address]

                                         

 

                          [facility] [place] [facility]     [address] [id number] [place] [address]

                                         

 

                          [facility] [place] [facility]     [address] [id number] [place] [address]

                                         

 

                          [facility]     [address] [id number] [place] [address]

                                         

 

                          [facility] [place] [facility]     [address] [id number] [place] [address]

                          31 May, 2013               

 

                          [facility] [place] [facility]     [address] [id number] [place] [address]

                          31 May, 2013               

 

                          [facility] [place] [facility]     [address] [id number] [place] [address]

                          01 May, 2013               

 

                          [facility]     [address] [id number] [place] [address]

                                         

 

                          [facility] [place] [facility]     [address] [id number] [place] [address]

                          10 Apr, 2013               

 

                          [facility]     [address] [id number] [place] [address]

                          22 Mar, 2013               

 

                          [facility] [place] [facility]     [address] [id number] [place] [address]

                          18 Mar, 2013               

 

                          [facility] [place] [facility]     [address] [id number] [place] [address]

                          05 Mar, 2013               

 

                          [facility] [place] [facility]     [address] [id number] [place] [address]

                          04 Mar, 2013               

 

                          [facility] [place] [facility]     [address] [id number] [place] [address]

                          04 Mar, 2013               

 

                          [facility] [place] [facility]     [address] [id number] [place] [address]

                                         

 

                          [facility] [place] [facility]     [address] [id number] [place] [address]

                          17 Dec, 2012               

 

                          [facility] [place] [facility]     [address] [id number] [place] [address]

                          05 Dec, 2012               

 

                          [facility] [place] [facility]     [address] [id number] [place] [address]

                          05 Dec, 2012               

 

                          [facility] [place] [facility]     [address] [id number] [place] [address]

                          03 Dec, 2012               

 

                          [facility]     [address] [id number] [place] [address]

                          03 Dec, 2012               

 

                          [facility] [place] [facility]     [address] [id number] [place] [address]

                                         

 

                          [facility] [place] [facility]     [address] [id number] [place] [address]

                                         

 

                          [facility] [place] [facility]     [address] [id number] [place] [address]

                                         

 

                          [facility] [place] [facility]     [id number] [place] [address], [place] [address]

                                         

 

                          [facility] [place] [facility]     [address] [id number] [place] [address]

                                         

 

                          [facility] [place] [facility]     [address] [id number] [place] [address]

                                         

 

                          [facility] [place] [facility]     [address] [id number] [place] [address]

                                         

 

                          [facility] [place] [facility]     [address] [id number] [place] [address]

                                         

 

                          [facility] [place] [facility]     [address] [facility] [id number] [place] [address]

                                         

 

                          [facility] [place] [facility]     [address] [id number] [place] [address]

                          24 Oct, 2012               

 

                          [facility] [place] [facility]     [address] [id number] [place] [address]

                          24 Oct, 2012               

 

                          [facility] [place] [facility]     [address] [id number] [place] [address]

                          24 Sep, 2012               

 

                          [facility] [place] [facility]     [address] [id number] [place] [address]

                          19 Sep, 2012               

 

                          [facility] [place] [facility]     [address] [id number] [place] [address]

                          17 Sep, 2012               

 

                          [facility]     [address] [facility] [id number] [place] [address]

                                         

 

                          [facility]     [address] [facility] [id number] [place] [address]

                          15 Oct, 2010               







IMMUNIZATIONS

No Known Immunizations



SOCIAL HISTORY

Never Assessed



REASON FOR VISIT

[facility]



PLAN OF CARE





VITAL SIGNS





MEDICATIONS

Unknown Medications



RESULTS

No Results



PROCEDURES

No Known procedures



INSTRUCTIONS





MEDICATIONS ADMINISTERED

No Known Medications

## 2019-06-04 VITALS — DIASTOLIC BLOOD PRESSURE: 66 MMHG | SYSTOLIC BLOOD PRESSURE: 102 MMHG

## 2019-06-04 VITALS — SYSTOLIC BLOOD PRESSURE: 81 MMHG | DIASTOLIC BLOOD PRESSURE: 52 MMHG

## 2019-06-04 VITALS — SYSTOLIC BLOOD PRESSURE: 91 MMHG | DIASTOLIC BLOOD PRESSURE: 61 MMHG

## 2019-06-04 VITALS — DIASTOLIC BLOOD PRESSURE: 77 MMHG | SYSTOLIC BLOOD PRESSURE: 103 MMHG

## 2019-06-04 VITALS — SYSTOLIC BLOOD PRESSURE: 86 MMHG | DIASTOLIC BLOOD PRESSURE: 49 MMHG

## 2019-06-04 VITALS — DIASTOLIC BLOOD PRESSURE: 61 MMHG | SYSTOLIC BLOOD PRESSURE: 96 MMHG

## 2019-06-04 VITALS — SYSTOLIC BLOOD PRESSURE: 90 MMHG | DIASTOLIC BLOOD PRESSURE: 60 MMHG

## 2019-06-04 VITALS — DIASTOLIC BLOOD PRESSURE: 66 MMHG | SYSTOLIC BLOOD PRESSURE: 95 MMHG

## 2019-06-04 VITALS — DIASTOLIC BLOOD PRESSURE: 72 MMHG | SYSTOLIC BLOOD PRESSURE: 103 MMHG

## 2019-06-04 VITALS — DIASTOLIC BLOOD PRESSURE: 54 MMHG | SYSTOLIC BLOOD PRESSURE: 93 MMHG

## 2019-06-04 VITALS — DIASTOLIC BLOOD PRESSURE: 69 MMHG | SYSTOLIC BLOOD PRESSURE: 108 MMHG

## 2019-06-04 VITALS — DIASTOLIC BLOOD PRESSURE: 54 MMHG | SYSTOLIC BLOOD PRESSURE: 78 MMHG

## 2019-06-04 VITALS — DIASTOLIC BLOOD PRESSURE: 49 MMHG | SYSTOLIC BLOOD PRESSURE: 80 MMHG

## 2019-06-04 VITALS — SYSTOLIC BLOOD PRESSURE: 92 MMHG | DIASTOLIC BLOOD PRESSURE: 60 MMHG

## 2019-06-04 LAB
ALBUMIN SERPL-MCNC: 3.6 GM/DL (ref 3.2–4.5)
ALP SERPL-CCNC: 59 U/L (ref 40–136)
ALT SERPL-CCNC: 13 U/L (ref 0–55)
BASOPHILS # BLD AUTO: 0 10^3/UL (ref 0–0.1)
BASOPHILS NFR BLD AUTO: 0 % (ref 0–10)
BILIRUB SERPL-MCNC: 1.4 MG/DL (ref 0.1–1)
BUN/CREAT SERPL: 22
CALCIUM SERPL-MCNC: 8.9 MG/DL (ref 8.5–10.1)
CHLORIDE SERPL-SCNC: 109 MMOL/L (ref 98–107)
CO2 SERPL-SCNC: 19 MMOL/L (ref 21–32)
CREAT SERPL-MCNC: 0.82 MG/DL (ref 0.6–1.3)
EOSINOPHIL # BLD AUTO: 0.1 10^3/UL (ref 0–0.3)
EOSINOPHIL NFR BLD AUTO: 3 % (ref 0–10)
ERYTHROCYTE [DISTWIDTH] IN BLOOD BY AUTOMATED COUNT: 12.4 % (ref 10–14.5)
GFR SERPLBLD BASED ON 1.73 SQ M-ARVRAT: > 60 ML/MIN
GLUCOSE SERPL-MCNC: 64 MG/DL (ref 70–105)
HCT VFR BLD CALC: 37 % (ref 35–52)
HGB BLD-MCNC: 12.3 G/DL (ref 11.5–16)
LYMPHOCYTES # BLD AUTO: 2 X 10^3 (ref 1–4)
LYMPHOCYTES NFR BLD AUTO: 40 % (ref 12–44)
MAGNESIUM SERPL-MCNC: 2.1 MG/DL (ref 1.8–2.4)
MANUAL DIFFERENTIAL PERFORMED BLD QL: NO
MCH RBC QN AUTO: 29 PG (ref 25–34)
MCHC RBC AUTO-ENTMCNC: 33 G/DL (ref 32–36)
MCV RBC AUTO: 88 FL (ref 80–99)
MONOCYTES # BLD AUTO: 0.5 X 10^3 (ref 0–1)
MONOCYTES NFR BLD AUTO: 10 % (ref 0–12)
NEUTROPHILS # BLD AUTO: 2.3 X 10^3 (ref 1.8–7.8)
NEUTROPHILS NFR BLD AUTO: 47 % (ref 42–75)
PHOSPHATE SERPL-MCNC: 3.9 MG/DL (ref 2.3–4.7)
PLATELET # BLD: 271 10^3/UL (ref 130–400)
PMV BLD AUTO: 9.7 FL (ref 7.4–10.4)
POTASSIUM SERPL-SCNC: 3.4 MMOL/L (ref 3.6–5)
PROT SERPL-MCNC: 6.7 GM/DL (ref 6.4–8.2)
SODIUM SERPL-SCNC: 140 MMOL/L (ref 135–145)
WBC # BLD AUTO: 5 10^3/UL (ref 4.3–11)

## 2019-06-04 RX ADMIN — SODIUM CHLORIDE SCH MLS/HR: 900 INJECTION, SOLUTION INTRAVENOUS at 07:43

## 2019-06-04 RX ADMIN — SODIUM CHLORIDE SCH MLS/HR: 900 INJECTION, SOLUTION INTRAVENOUS at 03:09

## 2019-06-04 NOTE — XMS REPORT
Continuity of Care Document

                             Created on: 2019



JORGE LOVE

External Reference #: [id number]

: 1990

Sex: Female



Demographics







                          Address                    [address]

[place]  [address]

 

                          Home Phone                [phone] x

 

                          Preferred Language        Unknown

 

                          Marital Status            Unknown

 

                          [employer] Affiliation     Unknown

 

                          Race                      Unknown

 

                          Ethnic Group              Unknown





Author







                          Organization              Unknown

 

                          Address                   Unknown

 

                          Phone                     [phone]



              



Allergies

      





             Active              Description              Code              Type              Severity

                Reaction              Onset              Reported/Identified              Relationship

 to Patient                             Clinical Status        

 

             Yes              CYCLOBENZAPRINE                                            UNKNOWN     

                GI PROBLEMS - NAUSEA                                                      

                                                 

 

             Yes              NO KNOWN DRUG ALLERGIES                                            UNKNOWN

                NO KNOWN DRUG ALLERG                                                 

                                                 

 

                Yes              hydromorphone HCl              [id number]              Drug Allergy  

             Mild              ITCHING                             10/15/2013               

                                                 

 

                Yes              No Known Drug Allergies              [id number]              Drug Allergy

              Unknown              N/A                             2017              

                                                 



                        



Medications

      





                Medication              Packaging              Start Date              Stop Date      

                    Route               Dosage              Sig        

 

                                      LACTATED RINGERS 1000CC IV BAG INJ                        ml          

             2018                                                     

   CONTINUOUSEVERY 0 Hour                  

 

                                                    ONDANSETRON VIAL INJ 4 MG/2CC (ZOFRAN 2CC VIAL)                     

             MG              2018                                      

                                                    PRN ONCE                  

 

                                      Hydromorphone  inj 2mg/cc vial (Dilaudid)                        MG   

             2018                                              

          PRN ONCE                  

 

                                      LACTATED RINGERS 1000CC IV BAG INJ                        ml          

             2018                                                     

   ONCE&1925                  

 

                                      LACTATED RINGERS 1000CC IV BAG INJ                        ml          

             2018                                                     

   ONCE&                  

 

                                      Hydromorphone  inj 2mg/cc vial (Dilaudid)                        MG   

             2018                                              

          ONCE&                  

 

                                TRAMADOL TAB 50 MG (ULTRAM)                        MG                  2018                                                          ONCE&211

                  

 

                                      LACTATED RINGERS 1000CC IV BAG INJ                        ml          

             2018                                                     

   CONTINUOUSEVERY 0 Hour                  



                                



Problems

      





             Date Dx Coded              Attending              Type              Code              Diagnosis

                                        Diagnosed By        

 

                2010              YAS DIAZ PSYD                              244.9    

                          HYPOTHYROIDISM                       

 

                2010              YAS DIAZ PSYD                              278.00   

                          OBESITY                            

 

                2010              YAS DIAZ PSYD                              V25.01   

                          Oral Contraceptives                       

 

                2010              TRISTA PEACE                              244.9   

                          HYPOTHYROIDISM                       

 

                2010              TRISTA PEACE                              278.00  

                          Obesity                            

 

                2010              FRANCI PEACEIA R                              V25.01  

                          Oral Contraceptives                       

 

                2010              AGA JOHNSON MD                              244.9           

                          HYPOTHYROIDISM                       

 

                2010              AGA JOHNSON MD                              278.00          

                          Obesity                            

 

                2010              AGA JOHNSON MD                              V25.01          

                          Oral Contraceptives                       

 

             2010                                            244.9              HYPOTHYROIDISM 

                                                 

 

             2010                                            278.00              Obesity       

                                                 

 

             2010                                            V25.01              Oral Contraceptives

                                                 

 

             2010                                            244.9              HYPOTHYROIDISM 

                                                 

 

             2010                                            278.00              Obesity       

                                                 

 

             2010                                            V25.01              Oral Contraceptives

                                                 

 

             2010                                            244.9              HYPOTHYROIDISM 

                                                 

 

             2010                                            278.00              Obesity       

                                                 

 

             2010                                            V25.01              Oral Contraceptives

                                                 

 

                2010              TRISTA PEACE R                              244.9   

                          HYPOTHYROIDISM                       

 

                2010              FRANCI PEACEIA R                              278.00  

                          Obesity                            

 

                2010              FRANCI PEACEIA R                              V25.01  

                          Oral Contraceptives                       

 

                2010              ANDREW ROACH T                              244.9        

                          HYPOTHYROIDISM                       

 

                2010              ANDREW ROACH T                              278.00       

                          Obesity                            

 

                2010              ESTELITA JHA ANDREW T                              V25.01       

                          Oral Contraceptives                       

 

             2010              MOSER DO, ESTIVEN K                             244.9              

HYPOTHYROIDISM                                   

 

                2010              MOSER DO, ESTIVEN K                              278.00           

                          Obesity                            

 

                2010              MOSER DO, ESTIVEN K                              V25.01           

                          Oral Contraceptives                       

 

             2010              MOSER DO, ESTIVEN K                             244.9              

HYPOTHYROIDISM                                   

 

                2010              MOSER DO, ESTIVEN K                              278.00           

                          Obesity                            

 

                2010              MOSER DO, ESTIVEN K                              V25.01           

                          Oral Contraceptives                       

 

                2010              YAS DIAZ PSYD L                              244.9    

                          HYPOTHYROIDISM                       

 

                2010              YAS DIAZ PSYD ANN L                              278.00   

                          OBESITY                            

 

                2010              YAS DIAZ PSYD L                              V25.01   

                          Oral Contraceptives                       

 

                2010              YAS DIAZ PSYD L                              V25.49   

                          SURVEILLANCE OF OTHER CONTRACEPTIVE METHOD                       

 

                2010              TRISTA PEACE R                              V25.49  

                          SURVEILLANCE OF OTHER CONTRACEPTIVE METHOD                       

 

                2010              AGA JOHNSON MD                              V25.49          

                          SURVEILLANCE OF OTHER CONTRACEPTIVE METHOD                       

 

             2010                                            V25.49              SURVEILLANCE OF

 OTHER CONTRACEPTIVE METHOD                       

 

             2010                                            V25.49              SURVEILLANCE OF

 OTHER CONTRACEPTIVE METHOD                       

 

             2010                                            V25.49              SURVEILLANCE OF

 OTHER CONTRACEPTIVE METHOD                       

 

                2010              TRISTA PEACE                              V25.49  

                          SURVEILLANCE OF OTHER CONTRACEPTIVE METHOD                       

 

                2010              ANDREW ROACH                              V25.49       

                          SURVEILLANCE OF OTHER CONTRACEPTIVE METHOD                       

 

                2010              ESTIVEN MOSER DO K                              V25.49           

                          SURVEILLANCE OF OTHER CONTRACEPTIVE METHOD                       

 

                2010              ESTIVEN MOSER DO K                              V25.49           

                          SURVEILLANCE OF OTHER CONTRACEPTIVE METHOD                       

 

                2010              YAS DIAZ PSYD                              V25.49   

                          SURVEILLANCE OF OTHER CONTRACEPTIVE METHOD                       

 

                2011              YAS DIAZ PSYD                              611.0    

                          INFLAMMATORY DISEASE OF BREAST                       

 

                2011              TRISTA PEACE                              611.0   

                          INFLAMMATORY DISEASE OF BREAST                       

 

                2011              AGA JOHNSON MD                              611.0           

                          INFLAMMATORY DISEASE OF BREAST                       

 

             2011                                            611.0              INFLAMMATORY DISEASE

 OF BREAST                                       

 

             2011                                            611.0              INFLAMMATORY DISEASE

 OF BREAST                                       

 

             2011                                            611.0              INFLAMMATORY DISEASE

 OF BREAST                                       

 

                2011              TRISTA PEACE                              611.0   

                          INFLAMMATORY DISEASE OF BREAST                       

 

                2011              ANDREW ROACH                              611.0        

                          INFLAMMATORY DISEASE OF BREAST                       

 

             2011              ESTIVEN MOSER DO K                             611.0              

INFLAMMATORY DISEASE OF BREAST                       

 

             2011              ESTIVEN MOSER DO                             611.0              

INFLAMMATORY DISEASE OF BREAST                       

 

                2011              YAS DIAZ PSYD                              611.0    

                          INFLAMMATORY DISEASE OF BREAST                       

 

           2011                             Ot              521.00                             

         

 

           2011                             Ot              525.9                              

        

 

           2011                             Ot              787.03                             

         

 

                2011              YAS DIAZ PSYD                              278.01   

                          OBESITY, MORBID (BMI >40)                       

 

                2011              YAS DIAZ PSYD                              780.79   

                          fatigue                            

 

                2011              TRISTA PEACE                              278.01  

                          OBESITY MORBID                       

 

                2011              TRISTA PEACE                              780.79  

                          fatigue                            

 

                2011              AGA JOHNSON MD                              278.01          

                          OBESITY MORBID                       

 

                2011              AGA JOHNSON MD                              780.79          

                          fatigue                            

 

             2011                                            278.01              OBESITY MORBID

                                                 

 

             2011                                            780.79              fatigue       

                                                 

 

             2011                                            278.01              OBESITY MORBID

                                                 

 

             2011                                            780.79              fatigue       

                                                 

 

             2011                                            278.01              OBESITY MORBID

                                                 

 

             2011                                            780.79              fatigue       

                                                 

 

                2011              TRISTA PEACE R                              278.01  

                          OBESITY MORBID                       

 

                2011              TRISTA PEACE R                              780.79  

                          fatigue                            

 

                2011              ESTELITA ABHIJEETANDREW T                              278.01       

                          OBESITY MORBID                       

 

                2011              ESTELITA PARMARELIANDREW T                              780.79       

                          FATIGUE                            

 

                2011              MOSER DO ESTIVEN K                              278.01           

                          OBESITY MORBID                       

 

                2011              MOSER DO, ESTIVEN K                              780.79           

                          FATIGUE                            

 

                2011              MOSER DO, ESTIVEN K                              278.01           

                          OBESITY MORBID                       

 

                2011              MOSER DO, ESTIVEN K                              780.79           

                          FATIGUE                            

 

                2011              YAS DIAZ PSYD                              278.01   

                          OBESITY, MORBID (BMI >40)                       

 

                2011              YAS DIAZ PSYD                              780.79   

                          fatigue                            

 

                2011              YAS DIAZ PSYD                              616.10   

                          VAGINITIS VULVOVAGINITIS UNSPECIFIED                       

 

                2011              YAS DIAZ PSYD                              V25.09   

                          CONTRACEPTIVE COUNSELING                       

 

                2011              YAS DIAZ PSYD                              V72.31   

                          GYN EXAM, ROUTINE                       

 

                2011              TRISTA PEACE R                              616.10  

                          VAGINITIS VULVOVAGINITIS UNSPECIFIED                       

 

                2011              TRISTA PEACE R                              V25.09  

                          CONTRACEPTIVE COUNSELING                       

 

                2011              TRISTA PEACE                              V72.31  

                          GYN EXAM, ROUTINE                       

 

                2011              AGA JOHNSON MD                              616.10          

                          VAGINITIS VULVOVAGINITIS UNSPECIFIED                       

 

                2011              AGA JOHNSON MD                              V25.09          

                          CONTRACEPTIVE COUNSELING                       

 

                2011              AGA JHONSON MD                              V72.31          

                          GYN EXAM, ROUTINE                       

 

             2011                                            616.10              VAGINITIS VULVOVAGINITIS

 UNSPECIFIED                                     

 

             2011                                            V25.09              CONTRACEPTIVE 

COUNSELING                                       

 

             2011                                            V72.31              GYN EXAM, ROUTINE

                                                 

 

             2011                                            616.10              VAGINITIS VULVOVAGINITIS

 UNSPECIFIED                                     

 

             2011                                            V25.09              CONTRACEPTIVE 

COUNSELING                                       

 

             2011                                            V72.31              GYN EXAM, ROUTINE

                                                 

 

             2011                                            616.10              VAGINITIS VULVOVAGINITIS

 UNSPECIFIED                                     

 

             2011                                            V25.09              CONTRACEPTIVE 

COUNSELING                                       

 

             2011                                            V72.31              GYN EXAM, ROUTINE

                                                 

 

                2011              TRISTA PEACE R                              616.10  

                          VAGINITIS VULVOVAGINITIS UNSPECIFIED                       

 

                2011              TRISTA PEACE R                              V25.09  

                          CONTRACEPTIVE COUNSELING                       

 

                2011              TRISTA PEACE R                              V72.31  

                          GYN EXAM, ROUTINE                       

 

                2011              ANDREW ROACH                              616.10       

                          VAGINITIS VULVOVAGINITIS UNSPECIFIED                       

 

                2011              ANDREW ROACH                              V25.09       

                          CONTRACEPTIVE COUNSELING                       

 

                2011              ANDREW ROACH                              V72.31       

                          GYN EXAM, ROUTINE                       

 

                2011              MOSER MARIAH MALONEA K                              616.10           

                          VAGINITIS VULVOVAGINITIS UNSPECIFIED                       

 

                2011              MOSER DO ESTIVEN K                              V25.09           

                          CONTRACEPTIVE COUNSELING                       

 

                2011              SHANTI MALONE ESTIVEN K                              V72.31           

                          GYN EXAM, ROUTINE                       

 

                2011              MARIAH MOSER DOA K                              616.10           

                          VAGINITIS VULVOVAGINITIS UNSPECIFIED                       

 

                2011              MOSER MARIAH MALONEA K                              V25.09           

                          CONTRACEPTIVE COUNSELING                       

 

                2011              MOSER MARIAH MALONEA K                              V72.31           

                          GYN EXAM, ROUTINE                       

 

                2011              YAS DIAZ PSYD                              616.10   

                          VAGINITIS VULVOVAGINITIS UNSPECIFIED                       

 

                2011              YAS DIAZ PSYD                              V25.09   

                          CONTRACEPTIVE COUNSELING                       

 

                2011              YAS DIAZ PSYD                              V72.31   

                          GYN EXAM, ROUTINE                       

 

           2011                             Ot              729.1                              

        

 

           2011                             Ot              729.5                              

        

 

           2012                             Ot              616.0                              

        

 

           2012                             Ot              623.8                              

        

 

           2012                             Ot              041.49                             

         

 

           2012                             Ot              564.00                             

         

 

           2012                             Ot              590.80                             

         

 

                2012              YAS DIAZ PSYD                              309.81   

                          AN PTSD                            

 

                2012              TRISTA PEACE                              309.81  

                          AN PTSD                            

 

                2012              ELIZABETH PAVON, AGA                              309.81          

                          AN PTSD                            

 

             2012                                            309.81              AN PTSD       

                                                 

 

             2012                                            309.81              AN PTSD       

                                                 

 

             2012                                            309.81              AN PTSD       

                                                 

 

                2012              TRISTA PEACE R                              309.81  

                          AN PTSD                            

 

                2012              ANDREW ROACH                              309.81       

                          AN PTSD                            

 

                2012              MOSER DO ESTIVEN K                              309.81           

                          AN PTSD                            

 

                2012              MOSER DO ESTIVEN K                              309.81           

                          AN PTSD                            

 

                2012              YAS DIAZ PSYD                              309.81   

                          AN PTSD                            

 

                2012              YAS DIAZ PSYD                              724.5    

                          BACK PAIN, GENERAL                       

 

                2012              YAS DIAZ PSYD                              788.1    

                          DYSURIA                            

 

                2012              TRISTA PEACE R                              724.5   

                          BACK PAIN, GENERAL                       

 

                2012              TRISTA PAECE R                              788.1   

                          pain during urination (dysuria)                       

 

                2012              AGA JOHNSON MD                              724.5           

                          BACK PAIN, GENERAL                       

 

                2012              AGA JOHNSON MD                              788.1           

                          pain during urination (dysuria)                       

 

             2012                                            724.5              BACK PAIN, GENERAL

                                                 

 

             2012                                            788.1              pain during urination

 (dysuria)                                       

 

             2012                                            724.5              BACK PAIN, GENERAL

                                                 

 

             2012                                            788.1              pain during urination

 (dysuria)                                       

 

             2012                                            724.5              BACK PAIN, GENERAL

                                                 

 

             2012                                            788.1              pain during urination

 (dysuria)                                       

 

                2012              TRISTA PEACE R                              724.5   

                          BACK PAIN, GENERAL                       

 

                2012              TRISTA PEACE R                              788.1   

                          pain during urination (dysuria)                       

 

                2012              ANDREW ROACH                              724.5        

                          BACK PAIN, GENERAL                       

 

                2012              ANDREW ROACH                              788.1        

                          pain during urination (dysuria)                       

 

             2012              MOSER DO, ESTIVEN K                             724.5              

BACK PAIN, GENERAL                               

 

             2012              MOSER DO, ESTIVEN K                             788.1              

pain during urination (dysuria)                       

 

             2012              MOSER DO, ESTIVEN K                             724.5              

BACK PAIN, GENERAL                               

 

             2012              MOSER DO, ESTIVEN K                             788.1              

pain during urination (dysuria)                       

 

                2012              YAS DIAZ PSYD                              724.5    

                          BACK PAIN, GENERAL                       

 

                2012              YAS DIAZ PSYD                              788.1    

                          DYSURIA                            

 

           2013                             Ot              276.51                             

         

 

           2013                             Ot              599.0                              

        

 

           2013                             Ot              780.4                              

        

 

                2013              TRISTA PEACE R                              789.09  

                          flank pain right                       

 

                2013              AGA JOHNSON MD                              789.09          

                          flank pain right                       

 

             2013                                            789.09              flank pain right

                                                 

 

             2013                                            789.09              flank pain right

                                                 

 

             2013                                            789.09              flank pain right

                                                 

 

                2013              TRISTA PEACE R                              789.09  

                          flank pain right                       

 

                2013              ANDREW ROACH                              789.09       

                          FLANK PAIN RIGHT                       

 

                2013              ESTIVEN MOSER DO                              789.09           

                          FLANK PAIN RIGHT                       

 

                2013              ESTIVEN MOSER DO K                              789.09           

                          FLANK PAIN RIGHT                       

 

                2013              AGA JOHNSON MD                              278.00          

                          OBESITY                            

 

                2013              AGA JOHNSON MD                              724.2           

                          BACK PAIN, LOWER                       

 

                2013              AGA JOHNSON MD                              789.05          

                          ABDOMINAL PAIN PERIUMBILIC                       

 

             2013                                            278.00              OBESITY       

                                                 

 

             2013                                            724.2              BACK PAIN, LOWER

                                                 

 

             2013                                            789.05              ABDOMINAL PAIN

 PERIUMBILIC                                     

 

             2013                                            278.00              OBESITY       

                                                 

 

             2013                                            724.2              BACK PAIN, LOWER

                                                 

 

             2013                                            789.05              ABDOMINAL PAIN

 PERIUMBILIC                                     

 

             2013                                            278.00              OBESITY       

                                                 

 

             2013                                            724.2              BACK PAIN, LOWER

                                                 

 

             2013                                            789.05              ABDOMINAL PAIN

 PERIUMBILIC                                     

 

                2013              TRISTA PEACE R                              278.00  

                          OBESITY                            

 

                2013              TRISTA PEACE R                              724.2   

                          BACK PAIN, LOWER                       

 

                2013              TRISTA PEACE R                              789.05  

                          ABDOMINAL PAIN PERIUMBILIC                       

 

                2013              ANDREW ROACH                              278.00       

                          OBESITY                            

 

                2013              ANDREW ROACH                              724.2        

                          BACK PAIN, LOWER                       

 

                2013              ANDREW ROACH                              789.05       

                          ABDOMINAL PAIN PERIUMBILIC                       

 

                2013              MARIAH MOSER DOA K                              278.00           

                          OBESITY                            

 

             2013              ESTIVEN MOSER DO                             724.2              

BACK PAIN, LOWER                                 

 

                2013              MOSER DO, ESTIVEN K                              789.05           

                          ABDOMINAL PAIN PERIUMBILIC                       

 

                2013              MOSER DO, ESTIVEN K                              278.00           

                          OBESITY                            

 

             2013              MOSER DO, ESTIVEN K                             724.2              

BACK PAIN, LOWER                                 

 

                2013              MOSER DO, ESTIVEN K                              789.05           

                          ABDOMINAL PAIN PERIUMBILIC                       

 

             2013                                            626.4              IRREGULAR MENSTRUAL

 CYCLE                                           

 

             2013                                            783.5              POLYDIPSIA     

                                                 

 

             2013                                            788.41              URINARY FREQUENCY

                                                 

 

             2013                                            626.4              IRREGULAR MENSTRUAL

 CYCLE                                           

 

             2013                                            783.5              POLYDIPSIA     

                                                 

 

             2013                                            788.41              URINARY FREQUENCY

                                                 

 

             2013                                            626.4              IRREGULAR MENSTRUAL

 CYCLE                                           

 

             2013                                            783.5              POLYDIPSIA     

                                                 

 

             2013                                            788.41              URINARY FREQUENCY

                                                 

 

                2013              TRISTA PEACE R                              626.4   

                          IRREGULAR MENSTRUAL CYCLE                       

 

                2013              TRISTA PEACE R                              783.5   

                          POLYDIPSIA                         

 

                2013              TRISTA PEACE R                              788.41  

                          URINARY FREQUENCY                       

 

                2013              ANDREW ROACH                              626.4        

                          IRREGULAR MENSTRUAL CYCLE                       

 

                2013              ANDREW ROACH                              783.5        

                          POLYDIPSIA                         

 

                2013              ANDREW ROACH                              788.41       

                          URINARY FREQUENCY                       

 

             2013              MOSER DO, ESTIVEN K                             626.4              

IRREGULAR MENSTRUAL CYCLE                        

 

             2013              MOSER DO, ESTIVEN K                             783.5              

POLYDIPSIA                                       

 

                2013              MOSER DO, ESTIVEN K                              788.41           

                          URINARY FREQUENCY                       

 

             2013              MOSER DO, ESTIVEN K                             626.4              

IRREGULAR MENSTRUAL CYCLE                        

 

             2013              MOSER DO, ESTIVEN K                             783.5              

POLYDIPSIA                                       

 

                2013              MOSER DO, ESTIVEN K                              788.41           

                          URINARY FREQUENCY                       

 

             2013                                            569.42              ANAL OR RECTAL

 PAIN                                            

 

             2013                                            625.9              PELVIC PAIN    

                                                 

 

             2013                                            569.42              ANAL OR RECTAL

 PAIN                                            

 

             2013                                            625.9              PELVIC PAIN    

                                                 

 

                2013              TRISTA PEACE R                              569.42  

                          ANAL OR RECTAL PAIN                       

 

                2013              TRISTA PEACE R                              625.9   

                          PELVIC PAIN                        

 

                2013              ANDREW ROACH                              569.42       

                          ANAL OR RECTAL PAIN                       

 

                2013              ANDREW ROACH                              625.9        

                          PELVIC PAIN                        

 

                2013              MOSER DO ESTIVEN K                              569.42           

                          ANAL OR RECTAL PAIN                       

 

             2013              MOSER DO, ESTIVEN K                             625.9              

PELVIC PAIN                                      

 

                2013              MOSER DO, ESTIVEN K                              569.42           

                          ANAL OR RECTAL PAIN                       

 

             2013              MOSER DO, ESTIVEN K                             625.9              

PELVIC PAIN                                      

 

             2013                                            786.2              COUGH          

                                                 

 

                2013              TRISTA PEACE R                              786.2   

                          COUGH                              

 

                2013              ANDREW ROACH                              786.2        

                          COUGH                              

 

             2013              MOSER DO, ESTIVEN K                             786.2              

COUGH                                            

 

             2013              MOSER DO, ESTIVEN K                             786.2              

COUGH                                            

 

                10/10/2013              TRISTA PEACE R                              599.0   

                          URINARY TRACT INFECTION                       

 

                10/10/2013              ANDREW ROACH                              599.0        

                          URINARY TRACT INFECTION                       

 

             10/10/2013              MOSER DO ESTIVEN K                             599.0              

URINARY TRACT INFECTION                          

 

             10/10/2013              MOSER DO, ESTIVEN K                             599.0              

URINARY TRACT INFECTION                          

 

             2013              MOSER DO ESTIVEN K                             782.1              

RASH AND OTHER NONSPECIFIC SKIN ERUPTION                       

 

             2013              MOSER DO ESTIVEN K                             782.1              

RASH AND OTHER NONSPECIFIC SKIN ERUPTION                       

 

                2013              MOSER DO ESTIVEN K                              V72.42           

                          PREGNANCY TEST POSITIVE RESULT                       

 

             2013              JACKELIN BYRD DO              Ot              623.8              

                                                 

 

                2013              JACKELIN BYRD DO              Ot              640.03           

                                                             

 

             2013              JACKELIN BYRD DO              Ot              696.3              

                                                 

 

                2014              CARLINE VILLA DO              Ot              338.29      

                          OTHER CHRONIC PAIN                       

 

                2014              CARLINE VILLA DO              Ot              493.90      

                          ASTHMA, UNSPECIFIED                       

 

                2014              CARLINE VILLA DO              Ot              642.33      

                          TRANS HYPERTEN-ANTEPART                       

 

                2014              CARLINE VILLA DO              Ot              648.93      

                          OTH CURR COND-ANTEPARTUM                       

 

                2014              CARLINE VILLA DO              Ot              784.0       

                          HEADACHE                           

 

                2014              CARLINE VILLA DO              Ot              V12.54      

                          PERSONAL HX OF TIA,  CEREBRAL INFARCTION                       

 

                2014              CARLINE VILLA DO              Ot              525.9       

                                                             

 

                2014              CARLINE VILLA DO              Ot              648.93      

                                                             

 

                2014              FENECH DOCARLINE              Ot              784.0       

                                                             

 

                2014              FENECH DO, CARLINE AHUJA              Ot              787.02      

                                                             

 

                2014              FENECH DO, CARLINE AHUJA              Ot              599.0       

                                                             

 

                2014              FENECH DO, CARLINE AHUJA              Ot              646.63      

                                                             

 

                2014              FENECH DO, CARLINE AHUJA              Ot              V22.1       

                                                             

 

                2014              FENECH DOCARLINE              Ot              644.03      

                                                             

 

                2014              FENECH DO, CARLINE AHUJA              Ot              654.23      

                                                             

 

                2014              FENECH DO, CARLINE AHUJA              Ot              285.1       

                                                             

 

                2014              FENECH DO, CARLINE AHUJA              Ot              648.22      

                                                             

 

                2014              FENECH DO, CARLINE AHUJA              Ot              654.21      

                                                             

 

                2014              BHAVANIECH CARLINE MALONE              Ot              V27.0       

                                                             

 

                2016              AZIZA SNELL              Ot              N83.20     

                          UNSPECIFIED OVARIAN CYSTS                       

 

                2016              AZIZA SNELL Ot              Z32.02     

                          ENCOUNTER FOR PREGNANCY TEST, RESULT NEG                       

 

                2016              AZIZA SNELL              Ot              N39.0      

                          URINARY TRACT INFECTION, SITE NOT SPECIF                       

 

                2016              AZIZA SNELL              Ot              O20.0      

                          THREATENED                        

 

                2016              AZIZA SNELL              Ot              Z3A.01     

                          LESS THAN 8 WEEKS GESTATION OF PREGNANCY                       

 

                2016              AZIZA SNELL Ot              Z87.891    

                          PERSONAL HISTORY OF NICOTINE DEPENDENCE                       

 

                2016              DAISY MALONE CARLINE AHUJA              Ot              N13.1       

                          HYDRONEPHROSIS W URETERAL STRICTURE, NEC                       

 

                2016              DAISY MALONE CARLINE S              Ot              O43.192     

                          OTHER MALFORMATION OF PLACENTA, SECOND T                       

 

                2016              DAISY MALONE CARLINE AHUJA              Ot              O99.89      

                          OTH DISEASES AND CONDITIONS COMPL PREG/C                       

 

                2016              DAISY MALONE CARLINE AHUJA              Ot              R09.89      

                          OTH SYMPTOMS AND SIGNS INVOLVING THE CIR                       

 

                2016              DAISY MALONE CARLINE AHUJA              Ot              O34.21      

                          MATERNAL CARE FOR SCAR FROM PREVIOUS ROXANA                       

 

                2016              DAISY MALONE CARLINE S              Ot              O43.192     

                          OTHER MALFORMATION OF PLACENTA, SECOND T                       

 

                2016              DAISY MALONE CARLINE AHUJA              Ot              R09.89      

                          OTH SYMPTOMS AND SIGNS INVOLVING THE CIR                       

 

                2016              DAISY MALONE CARLINE AHUJA              Ot              N13.1       

                          HYDRONEPHROSIS W URETERAL STRICTURE, NEC                       

 

                2016              FENECH DO, CARLINE S              Ot              O43.192     

                          OTHER MALFORMATION OF PLACENTA, SECOND T                       

 

                2016              FENECH DO, CARLINE S              Ot              O99.89      

                          OTH DISEASES AND CONDITIONS COMPL PREG/C                       

 

                2016              FENECH DO, CARLINE S              Ot              R09.89      

                          OTH SYMPTOMS AND SIGNS INVOLVING THE CIR                       

 

                2016              FENECH DO, CARLINE S              Ot              N13.1       

                          HYDRONEPHROSIS W URETERAL STRICTURE, NEC                       

 

                2016              FENECH DO, CARLINE S              Ot              O43.192     

                          OTHER MALFORMATION OF PLACENTA, SECOND T                       

 

                2016              FENECH DO, CARLINE S              Ot              O99.89      

                          OTH DISEASES AND CONDITIONS COMPL PREG/C                       

 

                2016              FENECH DO, CARLINE S              Ot              R09.89      

                          OTH SYMPTOMS AND SIGNS INVOLVING THE CIR                       

 

                2016              FENECH DO, CARLINE S              Ot              O34.21      

                          MATERNAL CARE FOR SCAR FROM PREVIOUS ROXANA                       

 

                2016              FENECH DO, CARLINE S              Ot              O43.192     

                          OTHER MALFORMATION OF PLACENTA, SECOND T                       

 

                2016              FENECH DO, CARLINE S              Ot              R09.89      

                          OTH SYMPTOMS AND SIGNS INVOLVING THE CIR                       

 

                10/05/2016              FENECH DO, CARLINE S              Ot              N13.1       

                          HYDRONEPHROSIS W URETERAL STRICTURE, NEC                       

 

                10/05/2016              FENECH DO, CARLINE S              Ot              O43.192     

                          OTHER MALFORMATION OF PLACENTA, SECOND T                       

 

                10/05/2016              FENECH DO, CARLINE S              Ot              O99.89      

                          OTH DISEASES AND CONDITIONS COMPL PREG/C                       

 

                10/05/2016              FENECH DO, CARLINE S              Ot              R09.89      

                          OTH SYMPTOMS AND SIGNS INVOLVING THE CIR                       

 

                10/05/2016              FENECH DO, CARLINE S              Ot              O34.21      

                          MATERNAL CARE FOR SCAR FROM PREVIOUS ROXANA                       

 

                10/05/2016              FENECH DO, CARLINE S              Ot              O43.192     

                          OTHER MALFORMATION OF PLACENTA, SECOND T                       

 

                10/05/2016              FENECH DO, CARLINE S              Ot              R09.89      

                          OTH SYMPTOMS AND SIGNS INVOLVING THE CIR                       

 

                10/05/2016              ANNA ANDERSON MD              Ot              M54.5         

                          LOW BACK PAIN                       

 

                10/05/2016              ANNA ANDERSON MD              Ot              O99.89        

                          OTH DISEASES AND CONDITIONS COMPL PREG/C                       

 

                10/05/2016              ANNA ANDERSON MD              Ot              Z3A.32        

                          32 WEEKS GESTATION OF PREGNANCY                       

 

                10/10/2016              ANNA ANDERSON MD              Ot              M54.5         

                          LOW BACK PAIN                       

 

                10/10/2016              ANNA ANDERSON MD              Ot              O99.89        

                          OTH DISEASES AND CONDITIONS COMPL PREG/C                       

 

                10/10/2016              ANNA ANDERSON MD              Ot              Z3A.32        

                          32 WEEKS GESTATION OF PREGNANCY                       

 

                10/10/2016              FENECH DO, CARLINE S              Ot              N13.1       

                          HYDRONEPHROSIS W URETERAL STRICTURE, NEC                       

 

                10/10/2016              FENECH DO, CARLINE S              Ot              O43.192     

                          OTHER MALFORMATION OF PLACENTA, SECOND T                       

 

                10/10/2016              FENECH DO, CARLINE S              Ot              O99.89      

                          OTH DISEASES AND CONDITIONS COMPL PREG/C                       

 

                10/10/2016              FENECH DO, CARLINE S              Ot              R09.89      

                          OTH SYMPTOMS AND SIGNS INVOLVING THE CIR                       

 

                10/10/2016              FENECH DO, CARLINE S              Ot              O34.21      

                          MATERNAL CARE FOR SCAR FROM PREVIOUS ROXANA                       

 

                10/10/2016              FENECH DO, CARLINE S              Ot              O43.192     

                          OTHER MALFORMATION OF PLACENTA, SECOND T                       

 

                10/10/2016              FENECH DO, CARLINE S              Ot              R09.89      

                          OTH SYMPTOMS AND SIGNS INVOLVING THE CIR                       

 

                10/12/2016              ANNA ANDERSON MD, Ot              M54.5         

                          LOW BACK PAIN                       

 

                10/12/2016              ANNA ANDERSON MD              Ot              O99.89        

                          OTH DISEASES AND CONDITIONS COMPL PREG/C                       

 

                10/12/2016              ANNA ANDERSON MD              Ot              Z3A.32        

                          32 WEEKS GESTATION OF PREGNANCY                       

 

                10/24/2016              BETSY TOVAR MD, Ot              O47.9  

                          FALSE LABOR, UNSPECIFIED                       

 

                10/24/2016              BETSY TOVAR MD, Ot.35 

                          35 WEEKS GESTATION OF PREGNANCY                       

 

                10/27/2016              BETSY TOVAR MD, Ot              O47.9  

                          FALSE LABOR, UNSPECIFIED                       

 

                10/27/2016              BETSY TOVAR MD, Ot.00 

                          WEEKS OF GESTATION OF PREGNANCY NOT SPEC                       

 

                10/27/2016              BETSY TOVAR MD, Ot              O47.9  

                          FALSE LABOR, UNSPECIFIED                       

 

                10/27/2016              BETSY TOVAR MD, Ot.35 

                          35 WEEKS GESTATION OF PREGNANCY                       

 

                2016              FENECH DO CARLINE S              Ot              O26.893     

                          OTH PREGNANCY RELATED CONDITIONS, THIRD                        

 

                2016              FENECH DO, CARLINE S              Ot              O34.211     

                          MATERN CARE FOR LOW TRANSVERSE SCAR FROM                       

 

                2016              FENECH DOCARLINE              Ot              R10.2       

                          PELVIC AND PERINEAL PAIN                       

 

                2016              BHAVANIECH DOCARLINE              Ot              Z3A.37      

                          37 WEEKS GESTATION OF PREGNANCY                       

 

                2017              DAISY DOCARLINE S              Ot              N13.1       

                          HYDRONEPHROSIS W URETERAL STRICTURE, NEC                       

 

                2017              FENECH DO, CARLINE S              Ot              O43.192     

                          OTHER MALFORMATION OF PLACENTA, SECOND T                       

 

                2017              BHAVANIECH DO, CARLINE S              Ot              O99.89      

                          OTH DISEASES AND CONDITIONS COMPL PREG/C                       

 

                2017              FENECH DO, CARLINE S              Ot              R09.89      

                          OTH SYMPTOMS AND SIGNS INVOLVING THE CIR                       

 

                2017              FENECH DO, CARLINE S              Ot              O34.21      

                          MATERNAL CARE FOR SCAR FROM PREVIOUS ROXANA                       

 

                2017              BHAVANIECH DOCARLINE S              Ot              O43.192     

                          OTHER MALFORMATION OF PLACENTA, SECOND T                       

 

                2017              BHAVANIECH DOCARLINE S              Ot              R09.89      

                          OTH SYMPTOMS AND SIGNS INVOLVING THE CIR                       

 

                2017              BHAVANIECH DOCARLINE              Ot              N80.9       

                          ENDOMETRIOSIS, UNSPECIFIED                       

 

                2017              FENECH DOCALRINE S              Ot              Z01.812     

                          ENCOUNTER FOR PREPROCEDURAL LABORATORY E                       

 

                2017              BHAVANIECH DO, CARLINE S              Ot              N80.9       

                          ENDOMETRIOSIS, UNSPECIFIED                       

 

                2017              FENECH DO, CARLINE S              Ot              Z01.812     

                          ENCOUNTER FOR PREPROCEDURAL LABORATORY E                       

 

                2017              DAISY DO, CARLINE AHUJA              Ot              N13.1       

                          HYDRONEPHROSIS W URETERAL STRICTURE, NEC                       

 

                2017              BHAVANIECH DOCARLINE S              Ot              O43.192     

                          OTHER MALFORMATION OF PLACENTA, SECOND T                       

 

                2017              BHAVANIECH DOCARLINE S              Ot              O99.89      

                          OTH DISEASES AND CONDITIONS COMPL PREG/C                       

 

                2017              BHAVANIECH DO, CARLINE S              Ot              R09.89      

                          OTH SYMPTOMS AND SIGNS INVOLVING THE CIR                       

 

                2017              FENECH DOCARLINE S              Ot              O34.21      

                          MATERNAL CARE FOR SCAR FROM PREVIOUS ROXANA                       

 

                2017              BHAVANIECH DOCARLINE S              Ot              O43.192     

                          OTHER MALFORMATION OF PLACENTA, SECOND T                       

 

                2017              FENECH DOCARLINE S              Ot              R09.89      

                          OTH SYMPTOMS AND SIGNS INVOLVING THE CIR                       

 

                2017              FENECH DOCARLINE S              Ot              N73.6       

                          FEMALE PELVIC PERITONEAL ADHESIONS (POST                       

 

                2017              FENECH DOCARLINE S              Ot              N94.5       

                          SECONDARY DYSMENORRHEA                       

 

                2017              DAISY CARLINE MALONE S              Ot              R10.2       

                          PELVIC AND PERINEAL PAIN                       

 

                2017              CARLINE VILLA DO              Ot              R33.9       

                          RETENTION OF URINE, UNSPECIFIED                       

 

                2017              CARLINE VILLA DO              Ot              Z87.42      

                          PERSONAL HISTORY OF OTH DISEASES OF THE                        

 

                2017              JACOB MALONE ADONAY              Ot              F17.210         

                          NICOTINE DEPENDENCE, CIGARETTES, UNCOMPL                       

 

                2017              JACOB DO ADONAY              Ot              K04.7           

                          PERIAPICAL ABSCESS WITHOUT SINUS                       

 

                2017              JAMES DO, ADONAY              Ot              K59.09          

                          OTHER CONSTIPATION                       

 

                2017              JAMES DO, ADONAY              Ot              L03.211         

                          CELLULITIS OF FACE                       

 

                2017              JAMES DO ADONAY              Ot              R79.89          

                          OTHER SPECIFIED ABNORMAL FINDINGS OF BLO                       

 

                2017              JACOB DO ADONAY              Ot              F17.210         

                          NICOTINE DEPENDENCE, CIGARETTES, UNCOMPL                       

 

                2017              JACOB DO ADONAY              Ot              K04.7           

                          PERIAPICAL ABSCESS WITHOUT SINUS                       

 

                2017              JAMES DO ADONAY              Ot              K59.09          

                          OTHER CONSTIPATION                       

 

                2017              JAMES DO, ADONAY              Ot              L03.211         

                          CELLULITIS OF FACE                       

 

                2017              JAMES DO ADONAY              Ot              R79.89          

                          OTHER SPECIFIED ABNORMAL FINDINGS OF BLO                       

 

                2017              BHAVANIJOSE CARLINE MALONE S              Ot              N73.6       

                          FEMALE PELVIC PERITONEAL ADHESIONS (POST                       

 

                2017              BHAVANICARLINE GARCIA DO S              Ot              N94.5       

                          SECONDARY DYSMENORRHEA                       

 

                2017              CARLINE VILLA DO S              Ot              R10.2       

                          PELVIC AND PERINEAL PAIN                       

 

                2017              CARLINE VILLA DO              Ot              R33.9       

                          RETENTION OF URINE, UNSPECIFIED                       

 

                2017              CARLINE VILLA DO              Ot              Z87.42      

                          PERSONAL HISTORY OF OTH DISEASES OF THE                        

 

                2017              CARLINE VILLA DO S              Ot              N73.6       

                          FEMALE PELVIC PERITONEAL ADHESIONS (POST                       

 

                2017              CARLINE VILLA DO S              Ot              N94.5       

                          SECONDARY DYSMENORRHEA                       

 

                2017              CARLINE VILLA DO S              Ot              R10.2       

                          PELVIC AND PERINEAL PAIN                       

 

                2017              CARLINE VILLA DO S              Ot              R33.9       

                          RETENTION OF URINE, UNSPECIFIED                       

 

                2017              CARLINE VILLA DO              Ot              Z87.42      

                          PERSONAL HISTORY OF OTH DISEASES OF THE                        

 

                2017              CARLINE VILLA DO S              Ot              N13.1       

                          HYDRONEPHROSIS W URETERAL STRICTURE, NEC                       

 

                2017              BHAVANIECH DO, CARLINE S              Ot              O43.192     

                          OTHER MALFORMATION OF PLACENTA, SECOND T                       

 

                2017              DAISY DO, CARLINE S              Ot              O99.89      

                          OTH DISEASES AND CONDITIONS COMPL PREG/C                       

 

                2017              DAISY DO CARLINE S              Ot              R09.89      

                          OTH SYMPTOMS AND SIGNS INVOLVING THE CIR                       

 

                2017              DAISY DO CARLINE S              Ot              O34.21      

                          MATERNAL CARE FOR SCAR FROM PREVIOUS ROXANA                       

 

                2017              DAISY DO, CARLINE S              Ot              O43.192     

                          OTHER MALFORMATION OF PLACENTA, SECOND T                       

 

                2017              DAISY DO CARLINE S              Ot              R09.89      

                          OTH SYMPTOMS AND SIGNS INVOLVING THE CIR                       

 

                2017              JOLANTA JAMES DOI              Ot              F17.210         

                          NICOTINE DEPENDENCE, CIGARETTES, UNCOMPL                       

 

                2017              JOLANTA JAMES DOI              Ot              K04.7           

                          PERIAPICAL ABSCESS WITHOUT SINUS                       

 

                2017              JOLANTA JAMES DOI              Ot              K59.09          

                          OTHER CONSTIPATION                       

 

                2017              JACOB MALONE ADONAY              Ot              L03.211         

                          CELLULITIS OF FACE                       

 

                2017              JACOB MALONE ADONAY              Ot              R79.89          

                          OTHER SPECIFIED ABNORMAL FINDINGS OF BLO                       

 

                06/15/2017              DAISY MALONE CARLINE S              Ot              N13.1       

                          HYDRONEPHROSIS W URETERAL STRICTURE, NEC                       

 

                06/15/2017              DAISY DO, CARLINE S              Ot              O43.192     

                          OTHER MALFORMATION OF PLACENTA, SECOND T                       

 

                06/15/2017              DAISY DO CARLINE S              Ot              O99.89      

                          OTH DISEASES AND CONDITIONS COMPL PREG/C                       

 

                06/15/2017              DAISY DO CARLINE S              Ot              R09.89      

                          OTH SYMPTOMS AND SIGNS INVOLVING THE CIR                       

 

                06/15/2017              DAISY DO CARLINE S              Ot              O34.21      

                          MATERNAL CARE FOR SCAR FROM PREVIOUS ROXANA                       

 

                06/15/2017              DAISY DO, CARLINE S              Ot              O43.192     

                          OTHER MALFORMATION OF PLACENTA, SECOND T                       

 

                06/15/2017              DAISY DOCARLINE S              Ot              R09.89      

                          OTH SYMPTOMS AND SIGNS INVOLVING THE CIR                       

 

                2017              EDELMIRA CHENG MD              Ot              M41.84      

                          OTHER FORMS OF SCOLIOSIS, THORACIC REGIO                       

 

                2017              EDELMIRA CHENG MD              Ot              M54.2       

                          CERVICALGIA                        

 

                2017              EDELMIRA CHENG MD              Ot              M54.5       

                          LOW BACK PAIN                       

 

                2017              EDELMIRA CHENG MD              Ot              M54.6       

                          PAIN IN THORACIC SPINE                       

 

                2017              EDELMIRA CHENG MD              Ot              M41.84      

                          OTHER FORMS OF SCOLIOSIS, THORACIC REGIO                       

 

                2017              EDELMIRA CHENG MD              Ot              M54.2       

                          CERVICALGIA                        

 

                2017              EDELMIRA CHENG MD              Ot              M54.5       

                          LOW BACK PAIN                       

 

                2017              EDELMIRA CHENG MD              Ot              M54.6       

                          PAIN IN THORACIC SPINE                       

 

                2017              FENECH DO, CARLINE S              Ot              N13.1       

                          HYDRONEPHROSIS W URETERAL STRICTURE, NEC                       

 

                2017              FENECH DO, CARLINE S              Ot              O43.192     

                          OTHER MALFORMATION OF PLACENTA, SECOND T                       

 

                2017              FENECH DO, CARLINE S              Ot              O99.89      

                          OTH DISEASES AND CONDITIONS COMPL PREG/C                       

 

                2017              FENECH DO, CARLINE S              Ot              R09.89      

                          OTH SYMPTOMS AND SIGNS INVOLVING THE CIR                       

 

                2017              FENECH DO, CARLINE S              Ot              O34.21      

                          MATERNAL CARE FOR SCAR FROM PREVIOUS ROXANA                       

 

                2017              FENECH DO, CARLINE S              Ot              O43.192     

                          OTHER MALFORMATION OF PLACENTA, SECOND T                       

 

                2017              FENECH DO, CARLINE S              Ot              R09.89      

                          OTH SYMPTOMS AND SIGNS INVOLVING THE CIR                       

 

                2017              EDELMIRA CHENG MD              Ot              M41.84      

                          OTHER FORMS OF SCOLIOSIS, THORACIC REGIO                       

 

                2017              EDELMIRA CHENG MD              Ot              M54.2       

                          CERVICALGIA                        

 

                2017              EDELMIRA CHENG MD              Ot              M54.5       

                          LOW BACK PAIN                       

 

                2017              EDELMIRA CHENG MD              Ot              M54.6       

                          PAIN IN THORACIC SPINE                       

 

                2017              EDELMIRA CHENG MD              Ot              M54.5       

                          LOW BACK PAIN                       

 

                2017              EDELMIRA CHENG MD              Ot              R93.7       

                          ABNORMAL FINDINGS ON DIAGNOSTIC IMAGING                        

 

                2017              EDELMIRA CHENG MD              Ot              M54.5       

                          LOW BACK PAIN                       

 

                2017              EDELMIRA CHENG MD              Ot              R93.7       

                          ABNORMAL FINDINGS ON DIAGNOSTIC IMAGING                        

 

             2018              EDELMIRA CHENG              780.79              

OTHER MALAISE AND FATIGUE                        

 

             2018              EDELMIRA CHENG              787.02              

NAUSEA ALONE                                     

 

             2018              EDELMIRA CHENG              P96.89              

OTHER SPECIFIED CONDITIONS ORIGINATING IN THE  PERIOD                     

  

 

             2018              EDELMIRA CHENG              W              R11.0              NAUSEA

                                                 

 

             2018              EDELMIRA CHENG              W              780.79              

OTHER MALAISE AND FATIGUE                        

 

             2018              EDELMIRA CHENG              W              787.02              

NAUSEA ALONE                                     

 

             2018              EDELMIRA CHENG              W              P96.89              

OTHER SPECIFIED CONDITIONS ORIGINATING IN THE  PERIOD                     

  

 

             2018              EDELMIRA CHENG              R11.0              NAUSEA

                                                 

 

             2018              EDELMIRA CHENG              W              780.79              

OTHER MALAISE AND FATIGUE                        

 

             2018              EDELMIRA CHENG              W              787.02              

NAUSEA ALONE                                     

 

             2018              EDELMIRA CHENG              W              P96.89              

OTHER SPECIFIED CONDITIONS ORIGINATING IN THE  PERIOD                     

  

 

             2018              EDELMIRA CHENG              W              R11.0              NAUSEA

                                                 

 

             2018              EDELMIRA CHENG              W              R53.83              

OTHER FATIGUE                                    

 

             01/10/2018                             W              780.79              OTHER MALAISE 

AND FATIGUE                                      

 

             01/10/2018                             W              R53.83              OTHER FATIGUE 

                                                 

 

             01/10/2018                             W              780.79              OTHER MALAISE 

AND FATIGUE                                      

 

             01/10/2018                             W              R53.83              OTHER FATIGUE 

                                                 

 

             01/10/2018                             W              780.79              OTHER MALAISE 

AND FATIGUE                                      

 

             01/10/2018                             W              R53.83              OTHER FATIGUE 

                                                 

 

                2018              CARLINE VILLA DO              Ot              N13.1       

                          HYDRONEPHROSIS W URETERAL STRICTURE, NEC                       

 

                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     

                          OTHER MALFORMATION OF PLACENTA, SECOND T                       

 

                2018              CARLINE VILLA DO              Ot              O99.89      

                          OTH DISEASES AND CONDITIONS COMPL PREG/C                       

 

                2018              CARLINE VILLA DO              Ot              R09.89      

                          OTH SYMPTOMS AND SIGNS INVOLVING THE CIR                       

 

                2018              CARLINE VILLA DO              Ot              O34.21      

                          MATERNAL CARE FOR SCAR FROM PREVIOUS ROXANA                       

 

                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     

                          OTHER MALFORMATION OF PLACENTA, SECOND T                       

 

                2018              CARLINE VILLA DO              Ot              R09.89      

                          OTH SYMPTOMS AND SIGNS INVOLVING THE CIR                       

 

                2018              EDELMIRA CHENG MD              Ot              M41.84      

                          OTHER FORMS OF SCOLIOSIS, THORACIC REGIO                       

 

                2018              EDELMIRA CHENG MD              Ot              M54.2       

                          CERVICALGIA                        

 

                2018              EDELMIRA CHENG MD              Ot              M54.5       

                          LOW BACK PAIN                       

 

                2018              PROSPER PAVON, EDELMIRA LUQUE              Ot              M54.6       

                          PAIN IN THORACIC SPINE                       

 

                2018              EDELMIRA CHENG MD              Ot              M54.5       

                          LOW BACK PAIN                       

 

                2018              EDELMIRA CHENG MD              Ot              R93.7       

                          ABNORMAL FINDINGS ON DIAGNOSTIC IMAGING                        

 

                2018              CHRISTIANA GONGORA MD              Ot              F32.9        

                          MAJOR DEPRESSIVE DISORDER, SINGLE EPISOD                       

 

                2018              CHRISTIANA GONGORA MD              Ot              F41.9        

                          ANXIETY DISORDER, UNSPECIFIED                       

 

                2018              CHRISTIANA GONGORA MD              Ot              G43.909      

                          MIGRAINE, UNSP, NOT INTRACTABLE, WITHOUT                       

 

                2018              CHRISTIANA GONGORA MD              Ot              J45.909      

                          UNSPECIFIED ASTHMA, UNCOMPLICATED                       

 

                2018              CHRISTIANA GONGORA MD              Ot              K52.9        

                          NONINFECTIVE GASTROENTERITIS AND COLITIS                       

 

                2018              CHRISTIANA GONGORA MD              Ot              R11.10       

                          VOMITING, UNSPECIFIED                       

 

                2018              CHRISTIANA GONGORA MD              Ot              Z82.49       

                          FAMILY HX OF ISCHEM HEART DIS AND OTH DI                       

 

                2018              CHRISTIANA GONGORA MD              Ot              Z86.73       

                          PRSNL HX OF TIA (TIA), AND CEREB INFRC W                       

 

                2018              CHRISTIANA GONGORA MD              Ot              Z87.448      

                          PERSONAL HISTORY OF OTHER DISEASES OF UR                       

 

                2018              CHRISTIANA GONGORA MD              Ot              Z87.59       

                          PERSONAL HISTORY OF COMP OF PREG, CHLDBR                       

 

                2018              CHRISTIANA GONGORA MD              Ot              Z87.891      

                          PERSONAL HISTORY OF NICOTINE DEPENDENCE                       

 

                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      

                          ACQUIRED ABSENCE OF BOTH CERVIX AND UTER                       

 

                2018              CHRISTIANA GONGORA MD              Ot              F32.9        

                          MAJOR DEPRESSIVE DISORDER, SINGLE EPISOD                       

 

                2018              CHRISTIANA GONGORA MD              Ot              F41.9        

                          ANXIETY DISORDER, UNSPECIFIED                       

 

                2018              CHRISTIANA GONGORA MD              Ot              G43.909      

                          MIGRAINE, UNSP, NOT INTRACTABLE, WITHOUT                       

 

                2018              CHRISTIANA GONGORA MD              Ot              J45.909      

                          UNSPECIFIED ASTHMA, UNCOMPLICATED                       

 

                2018              CHRISTIANA GONGORA MD              Ot              K52.9        

                          NONINFECTIVE GASTROENTERITIS AND COLITIS                       

 

                2018              CHRISTIANA GONGORA MD              Ot              R11.10       

                          VOMITING, UNSPECIFIED                       

 

                2018              CHRISTIANA GONGORA MD              Ot              Z82.49       

                          FAMILY HX OF ISCHEM HEART DIS AND OTH DI                       

 

                2018              CHRISTIANA GONGORA MD              Ot              Z86.73       

                          PRSNL HX OF TIA (TIA), AND CEREB INFRC W                       

 

                2018              CHRISTIANA GONGORA MD, Ot              Z87.448      

                          PERSONAL HISTORY OF OTHER DISEASES OF UR                       

 

                2018              CHRISTIANA GONGORA MD, Ot              Z87.59       

                          PERSONAL HISTORY OF COMP OF PREG, CHLDBR                       

 

                2018              CHRISTIANA GONGORA MD              Ot              Z87.891      

                          PERSONAL HISTORY OF NICOTINE DEPENDENCE                       

 

                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      

                          ACQUIRED ABSENCE OF BOTH CERVIX AND UTER                       

 

                2018              CADENCE MATTSON              Ot              R10.11        

                          RIGHT UPPER QUADRANT PAIN                       

 

             2018              CHARO SEAY              W              620.2              OTHER

 AND UNSPECIFIED OVARIAN CYST                       

 

             2018              CHARO SEAY              W              787.01              NAUSEA

 WITH VOMITING                                   

 

             2018              CHARO SEAY              A              789.01              ABDOMINAL

 PAIN, RIGHT UPPER QUADRANT                       

 

             2018              KATERYNA SEAYUA              W              791.9              OTHER

 NONSPECIFIC FINDINGS ON EXAMINATION OF URINE                       

 

             2018              CHARO SEAY              W              N83.201              UNSPECIFIED

 OVARIAN CYST, RIGHT SIDE                        

 

             2018              KATERYNA SEAYUA              A              R10.11              RIGHT

 UPPER QUADRANT PAIN                             

 

             2018              KATERYNA SEAYUA              W              R11.2              NAUSEA

 WITH VOMITING, UNSPECIFIED                       

 

             2018              KATERYNA SEAYUA              W              R82.99              OTHER

 ABNORMAL FINDINGS IN URINE                       

 

                2018              CADENCE MATTSONP              Ot              R10.11        

                          RIGHT UPPER QUADRANT PAIN                       

 

                2018              Vance Arnold              A               530.81      

                          ESOPHAGEAL REFLUX                       

 

                2018              Vance Arnold              W               535.50      

                                        UNSPECIFIED GASTRITIS AND GASTRODUODENITIS, WITHOUT MENTION OF HEMORRHAGE  

                                                 

 

                2018              Vance Arnold              W               552.3       

                          DIAPHRAGMATIC HERNIA WITH OBSTRUCTION                       

 

                2018              Vance Arnold              A               K21.9       

                          GASTRO-ESOPHAGEAL REFLUX DISEASE WITHOUT ESOPHAGITIS                       

 

                2018              Vance Arnold              W               K29.60      

                          OTHER GASTRITIS WITHOUT BLEEDING                                             

      

 

                2018              Vance Arnold              W               K44.9       

                          DIAPHRAGMATIC HERNIA WITHOUT OBSTRUCTION OR GANGRENE                          

     

 

                2018              DAISY MALONE, CARLINE AHUJA              Ot              N13.1       

                          HYDRONEPHROSIS W URETERAL STRICTURE, NEC                       

 

                2018              DAISY DO, CARLINE AHUJA              Ot              O43.192     

                          OTHER MALFORMATION OF PLACENTA, SECOND T                       

 

                2018              DAISY MALONE, CARLINE AHUJA              Ot              O99.89      

                          OTH DISEASES AND CONDITIONS COMPL PREG/C                       

 

                2018              DAISY MALONE, CARLINE AHUJA              Ot              R09.89      

                          OTH SYMPTOMS AND SIGNS INVOLVING THE CIR                       

 

                2018              DAISY MALONE, CARLINE AHUJA              Ot              O34.21      

                          MATERNAL CARE FOR SCAR FROM PREVIOUS ROXANA                       

 

                2018              DAISY MALONE, CARLINE AHUJA              Ot              O43.192     

                          OTHER MALFORMATION OF PLACENTA, SECOND T                       

 

                2018              DAISY DO, CARLINE AHUJA              Ot              R09.89      

                          OTH SYMPTOMS AND SIGNS INVOLVING THE CIR                       

 

                2018              PROSPER PAVON, EDELMIRA LUQUE              Ot              M41.84      

                          OTHER FORMS OF SCOLIOSIS, THORACIC REGIO                       

 

                2018              PROSPER PAVON, EDELMIRA L              Ot              M54.2       

                          CERVICALGIA                        

 

                2018              PROSPER PAVON, EDELMIRA L              Ot              M54.5       

                          LOW BACK PAIN                       

 

                2018              PROSPER PAVON, EDELMIRA L              Ot              M54.6       

                          PAIN IN THORACIC SPINE                       

 

                2018              PROSPER PAVON, EDELMIRA L              Ot              M54.5       

                          LOW BACK PAIN                       

 

                2018              PROSPER PAVON, EDELMIRA L              Ot              R93.7       

                          ABNORMAL FINDINGS ON DIAGNOSTIC IMAGING                        

 

                2018              CADENCE MATTSON              Ot              R10.11        

                          RIGHT UPPER QUADRANT PAIN                       



                                                                                
                                                                                
                                                                                
                                                                                
                                                                                
                                                                                
                                                                                
                                                                                
                                                                                
                                                                                
                                                                                
                                                                                
            



Procedures

      





                Code              Description              Performed By              Performed On     

   

 

                                      17119                                    INDIV PSYTX 45/50 MIN        

                                                    2012        

 

                                      38443                                    UA W/ CULTURE IF INDICATED   

                                                    2013        

 

                                      62863                                    UA W/MICROSCOPY              

                                                    2013        

 

                                      90253                                    UA LONG DIP                  

                                                    2013        

 

                                      25427                                    XRAY ABDOMEN 2 VIEWS         

                                                    2013        

 

                                      89324                                    ROUTINE VENIPUNCTURE         

                                                    2013        

 

                                      91449                                    UA W/ CULTURE IF INDICATED   

                                                    2013        

 

                                      42486                                    URINE PREGNANCY TEST (IN-HOUSE)

                                                    2013        

 

                                67984                                    TSH                            

                                        2013        

 

                                      20897                                    A1C (IN-HOUSE)               

                                                    2013        

 

                                      36051                                    UA W/ CULTURE IF INDICATED   

                                                    2013        

 

                                      32170                                    UA W/ CULTURE IF INDICATED   

                                                    10/10/2013        

 

                                      89024                                    CULTURE URINE                

                                                    10/10/2013        

 

                                      OBSTETRIC                                    CANDICE BILL             

                                                    2013        

 

                                      56136                                    URINE PREGNANCY TEST (IN-HOUSE)

                                                    2013        

 

                                      [id number]                                    EXTRACTION OF POC, LOW CERVICAL,

 OPEN AP                                                  2016        



                                                



Results

      





                    Test                Result              Range        









                                        Complete urinalysis with reflex to culture - 10/05/16 19:40         









                    Urine color determination              YELLOW               NRG        

 

                    Urine clarity determination              SLIGHTLY CLOUDY               NRG        

 

                    Urine pH measurement by test strip              7                   5-9        

 

                    Specific gravity of urine by test strip              1.015               1.016-1.022

        

 

                          Urine protein assay by test strip, semi-quantitative              NEGATIVE        

                                        NEGATIVE        

 

                    Urine glucose detection by automated test strip              NEGATIVE               

NEGATIVE        

 

                          Erythrocytes detection in urine sediment by light microscopy              NEGATIVE

                                        NEGATIVE        

 

                    Urine ketones detection by automated test strip              NEGATIVE               

NEGATIVE        

 

                    Urine nitrite detection by test strip              NEGATIVE               NEGATIVE  

      

 

                    Urine total bilirubin detection by test strip              NEGATIVE               NEGATIVE

        

 

                          Urine urobilinogen measurement by automated test strip (mass/volume)              

4 mg/dL                                 NORMAL        

 

                    Urine leukocyte esterase detection by dipstick              2+                  NEGATIVE

        

 

                                        Automated urine sediment erythrocyte count by microscopy (number/high power field)

                          NONE                      NRG        

 

                                        Automated urine sediment leukocyte count by microscopy (number/high power field)

                           [HPF]                    NRG        

 

                          Bacteria detection in urine sediment by light microscopy              TRACE       

                                        NRG        

 

                                        Squamous epithelial cells detection in urine sediment by light microscopy       

                          10-25                     NRG        

 

                          Crystals detection in urine sediment by light microscopy              NONE        

                                        NRG        

 

                          Casts detection in urine sediment by light microscopy              NONE           

                                        NRG        

 

                          Mucus detection in urine sediment by light microscopy              NEGATIVE       

                                        NRG        

 

                    Complete urinalysis with reflex to culture              YES                 NRG       

 









                                        Bacterial urine culture - 10/05/16 19:40         









                    Bacterial urine culture              [id number]               NRG        

 

                    COLONY COUNT              >100,000/ML               NRG        

 

                    FTX;REPORTABLE              PLUS MIXED GRAM POSITIVES               NRG        

 

                    FREE TEXT ENTRY 2              <10,000/ML               NRG        









                                        Complete urinalysis with reflex to culture - 10/24/16 16:00         









                    Urine color determination              YELLOW               NRG        

 

                    Urine clarity determination              SLIGHTLY CLOUDY               NRG        

 

                    Urine pH measurement by test strip              6                   5-9        

 

                    Specific gravity of urine by test strip              1.025               1.016-1.022

        

 

                    Urine protein assay by test strip, semi-quantitative              2+                  NEGATIVE

        

 

                    Urine glucose detection by automated test strip              NEGATIVE               

NEGATIVE        

 

                          Erythrocytes detection in urine sediment by light microscopy              NEGATIVE

                                        NEGATIVE        

 

                    Urine ketones detection by automated test strip              4+                  NEGATIVE

        

 

                    Urine nitrite detection by test strip              NEGATIVE               NEGATIVE  

      

 

                    Urine total bilirubin detection by test strip              NEGATIVE               NEGATIVE

        

 

                          Urine urobilinogen measurement by automated test strip (mass/volume)              

4 mg/dL                                 NORMAL        

 

                    Urine leukocyte esterase detection by dipstick              2+                  NEGATIVE

        

 

                                        Automated urine sediment erythrocyte count by microscopy (number/high power field)

                          NONE                      NRG        

 

                                        Automated urine sediment leukocyte count by microscopy (number/high power field)

                           [HPF]                    NRG        

 

                          Bacteria detection in urine sediment by light microscopy              LARGE       

                                        NRG        

 

                                        Squamous epithelial cells detection in urine sediment by light microscopy       

                          25-50                     NRG        

 

                          Crystals detection in urine sediment by light microscopy              NONE        

                                        NRG        

 

                          Casts detection in urine sediment by light microscopy              NONE           

                                        NRG        

 

                          Mucus detection in urine sediment by light microscopy              LARGE          

                                        NRG        

 

                    Complete urinalysis with reflex to culture              YES                 NRG       

 









                                        Urine drug screening test - 10/24/16 16:00         









                          Urine phencyclidine detection by screening method              NEGATIVE           

                                        NEGATIVE        

 

                          Urine benzodiazepines detection by screening method              NEGATIVE         

                                        NEGATIVE        

 

                    Urine cocaine detection              NEGATIVE               NEGATIVE        

 

                          Urine amphetamines detection by screening method              NEGATIVE            

                                        NEGATIVE        

 

                          Urine methamphetamine detection by screening method              NEGATIVE         

                                        NEGATIVE        

 

                          Urine cannabinoids detection by screening method              NEGATIVE            

                                        NEGATIVE        

 

                    Urine opiates detection by screening method              NEGATIVE               NEGATIVE

        

 

                    Urine barbiturates detection              POSITIVE               NEGATIVE        

 

                          Screening urine tricyclic antidepressants detection              NEGATIVE         

                                        NEGATIVE        

 

                    Urine methadone detection by screening method              NEGATIVE               NEGATIVE

        

 

                    Urine oxycodone detection              NEGATIVE               NEGATIVE        

 

                    Urine propoxyphene detection              NEGATIVE               NEGATIVE        

 

                    Urine buprenophrine screen              NEGATIVE               NEGATIVE        









                                        Bacterial urine culture - 10/24/16 16:00         









                    URINE CULTURE RESULTS              10,000/ML - 100,000/ML               NRG        









                                        Complete blood count (CBC) with automated white blood cell (WBC) differential - 

16 15:55         









                          Blood leukocytes automated count (number/volume)              11.7 10*3/uL        

                                        4.3-11.0        

 

                          Blood erythrocytes automated count (number/volume)              3.87 10*6/uL      

                                        4.35-5.85        

 

                          Venous blood hemoglobin measurement (mass/volume)              11.2 g/dL          

                                        11.5-16.0        

 

                    Blood hematocrit (volume fraction)              33 %                35-52        

 

                    Automated erythrocyte mean corpuscular volume              85 [foz_us]              

80-99        

 

                                        Automated erythrocyte mean corpuscular hemoglobin (mass per erythrocyte)        

                          29 pg                     25-34        

 

                                        Automated erythrocyte mean corpuscular hemoglobin concentration measurement (mass/volume)

                          34 g/dL                   32-36        

 

                    Automated erythrocyte distribution width ratio              12.3 %              10.0-

14.5        

 

                    Automated blood platelet count (count/volume)              314 10*3/uL              

130-400        

 

                          Automated blood platelet mean volume measurement              9.9 [foz_us]        

                                        7.4-10.4        

 

                    Automated blood neutrophils/100 leukocytes              74 %                42-75     

   

 

                    Automated blood lymphocytes/100 leukocytes              18 %                12-44     

   

 

                    Blood monocytes/100 leukocytes              7 %                 0-12        

 

                    Automated blood eosinophils/100 leukocytes              1 %                 0-10       

 

 

                    Automated blood basophils/100 leukocytes              0 %                 0-10        

 

                          Blood neutrophils automated count (number/volume)              8.7 10*3           

                                        1.8-7.8        

 

                          Blood lymphocytes automated count (number/volume)              2.2 10*3           

                                        1.0-4.0        

 

                    Blood monocytes automated count (number/volume)              0.8 10*3              0.0-

1.0        

 

                    Automated eosinophil count              0.1 10*3/uL              0.0-0.3        

 

                    Automated blood basophil count (count/volume)              0.0 10*3/uL              

0.0-0.1        









                                        Blood type T Indirect antibody screen panel - 16 15:55         









                    ABO+Rh group              AN                  NRG        

 

                    Transfusion band number              [id number]               NRG        

 

                    Blood group antibody screen              NEGATIVE               NRG        









                                        Complete blood count (CBC) with automated white blood cell (WBC) differential - 

16 05:45         









                          Blood leukocytes automated count (number/volume)              12.6 10*3/uL        

                                        4.3-11.0        

 

                          Blood erythrocytes automated count (number/volume)              3.55 10*6/uL      

                                        4.35-5.85        

 

                          Venous blood hemoglobin measurement (mass/volume)              10.2 g/dL          

                                        11.5-16.0        

 

                    Blood hematocrit (volume fraction)              30 %                35-52        

 

                    Automated erythrocyte mean corpuscular volume              85 [foz_us]              

80-99        

 

                                        Automated erythrocyte mean corpuscular hemoglobin (mass per erythrocyte)        

                          29 pg                     25-34        

 

                                        Automated erythrocyte mean corpuscular hemoglobin concentration measurement (mass/volume)

                          34 g/dL                   32-36        

 

                    Automated erythrocyte distribution width ratio              12.4 %              10.0-

14.5        

 

                    Automated blood platelet count (count/volume)              276 10*3/uL              

130-400        

 

                          Automated blood platelet mean volume measurement              9.9 [foz_us]        

                                        7.4-10.4        

 

                    Automated blood neutrophils/100 leukocytes              76 %                42-75     

   

 

                    Automated blood lymphocytes/100 leukocytes              17 %                12-44     

   

 

                    Blood monocytes/100 leukocytes              7 %                 0-12        

 

                    Automated blood eosinophils/100 leukocytes              1 %                 0-10       

 

 

                    Automated blood basophils/100 leukocytes              0 %                 0-10        

 

                          Blood neutrophils automated count (number/volume)              9.5 10*3           

                                        1.8-7.8        

 

                          Blood lymphocytes automated count (number/volume)              2.1 10*3           

                                        1.0-4.0        

 

                    Blood monocytes automated count (number/volume)              0.9 10*3              0.0-

1.0        

 

                    Automated eosinophil count              0.1 10*3/uL              0.0-0.3        

 

                    Automated blood basophil count (count/volume)              0.0 10*3/uL              

0.0-0.1        









                                        RH IMMUNE GLOBULIN [facility] - 16 05:45         









                          RH IMMUNE GLOBULIN [facility]                             PRSMD TRFSD    16 [id number]

                                        NRG        









                                        Fetal cell screen - 16 05:45         









                    FETAL SCREEN LOT NUMBER              [id number]               NRG        

 

                    Transfusion band number              [id number]               NRG        

 

                    [id number]              1 300ug              NRG        

 

                    Erythrocytes.fetal/1000 erythrocytes              16               NRG        



 

                    Fetal cell screen              18               NRG        

 

                    Fetal cell screen              NEGATIVE               NEGATIVE        

 

                    Lot number              [id number]               NRG        









                                        Complete blood count (CBC) with automated white blood cell (WBC) differential - 

17 10:35         









                          Blood leukocytes automated count (number/volume)              6.6 10*3/uL         

                                        4.3-11.0        

 

                          Blood erythrocytes automated count (number/volume)              4.68 10*6/uL      

                                        4.35-5.85        

 

                          Venous blood hemoglobin measurement (mass/volume)              13.4 g/dL          

                                        11.5-16.0        

 

                    Blood hematocrit (volume fraction)              39 %                35-52        

 

                    Automated erythrocyte mean corpuscular volume              84 [foz_us]              

80-99        

 

                                        Automated erythrocyte mean corpuscular hemoglobin (mass per erythrocyte)        

                          29 pg                     25-34        

 

                                        Automated erythrocyte mean corpuscular hemoglobin concentration measurement (mass/volume)

                          34 g/dL                   32-36        

 

                    Automated erythrocyte distribution width ratio              13.1 %              10.0-

14.5        

 

                    Automated blood platelet count (count/volume)              340 10*3/uL              

130-400        

 

                          Automated blood platelet mean volume measurement              9.7 [foz_us]        

                                        7.4-10.4        

 

                    Automated blood neutrophils/100 leukocytes              63 %                42-75     

   

 

                    Automated blood lymphocytes/100 leukocytes              30 %                12-44     

   

 

                    Blood monocytes/100 leukocytes              6 %                 0-12        

 

                    Automated blood eosinophils/100 leukocytes              2 %                 0-10       

 

 

                    Automated blood basophils/100 leukocytes              0 %                 0-10        

 

                          Blood neutrophils automated count (number/volume)              4.2 10*3           

                                        1.8-7.8        

 

                          Blood lymphocytes automated count (number/volume)              2.0 10*3           

                                        1.0-4.0        

 

                    Blood monocytes automated count (number/volume)              0.4 10*3              0.0-

1.0        

 

                    Automated eosinophil count              0.1 10*3/uL              0.0-0.3        

 

                    Automated blood basophil count (count/volume)              0.0 10*3/uL              

0.0-0.1        









                                        Blood type T Indirect antibody screen panel - 17 10:35         









                    ABO+Rh group              AN                  NRG        

 

                    Transfusion band number              TNP                 NRG        

 

                    Blood group antibody screen              NEGATIVE               NRG        









                                        Methicillin resistant Staphylococcus aureus (MRSA) screening culture - 17 

10:35         









                          Methicillin resistant Staphylococcus aureus (MRSA) screening culture              

NEG                                     NRG        









                                        Urine beta human chorionic gonadotropin (hCG) measurement - 17 06:18      

   









                          Urine beta human chorionic gonadotropin (hCG) measurement              NEGATIVE   

                                        NEGATIVE        









                                        Blood type T Indirect antibody screen panel - 17 06:25         









                    ABO+Rh group              AN                  NRG        

 

                    Transfusion band number              [id number]               NRG        

 

                    Blood group antibody screen              NEGATIVE               NRG        









                                        Complete blood count (CBC) with automated white blood cell (WBC) differential - 

17 10:45         









                          Blood leukocytes automated count (number/volume)              8.6 10*3/uL         

                                        4.3-11.0        

 

                          Blood erythrocytes automated count (number/volume)              4.23 10*6/uL      

                                        4.35-5.85        

 

                          Venous blood hemoglobin measurement (mass/volume)              12.1 g/dL          

                                        11.5-16.0        

 

                    Blood hematocrit (volume fraction)              37 %                35-52        

 

                    Automated erythrocyte mean corpuscular volume              87 [foz_us]              

80-99        

 

                                        Automated erythrocyte mean corpuscular hemoglobin (mass per erythrocyte)        

                          29 pg                     25-34        

 

                                        Automated erythrocyte mean corpuscular hemoglobin concentration measurement (mass/volume)

                          33 g/dL                   32-36        

 

                    Automated erythrocyte distribution width ratio              13.3 %              10.0-

14.5        

 

                    Automated blood platelet count (count/volume)              270 10*3/uL              

130-400        

 

                          Automated blood platelet mean volume measurement              9.1 [foz_us]        

                                        7.4-10.4        

 

                    Automated blood neutrophils/100 leukocytes              70 %                42-75     

   

 

                    Automated blood lymphocytes/100 leukocytes              21 %                12-44     

   

 

                    Blood monocytes/100 leukocytes              7 %                 0-12        

 

                    Automated blood eosinophils/100 leukocytes              3 %                 0-10       

 

 

                    Automated blood basophils/100 leukocytes              0 %                 0-10        

 

                          Blood neutrophils automated count (number/volume)              6.0 10*3           

                                        1.8-7.8        

 

                          Blood lymphocytes automated count (number/volume)              1.8 10*3           

                                        1.0-4.0        

 

                    Blood monocytes automated count (number/volume)              0.6 10*3              0.0-

1.0        

 

                    Automated eosinophil count              0.2 10*3/uL              0.0-0.3        

 

                    Automated blood basophil count (count/volume)              0.0 10*3/uL              

0.0-0.1        









                                        Comprehensive metabolic panel - 17 10:45         









                          Serum or plasma sodium measurement (moles/volume)              138 mmol/L         

                                        135-145        

 

                          Serum or plasma potassium measurement (moles/volume)              4.0 mmol/L      

                                        3.6-5.0        

 

                          Serum or plasma chloride measurement (moles/volume)              106 mmol/L       

                                                

 

                    Carbon dioxide              24 mmol/L              21-32        

 

                          Serum or plasma anion gap determination (moles/volume)              8 mmol/L      

                                        5-14        

 

                          Serum or plasma urea nitrogen measurement (mass/volume)              5 mg/dL      

                                        7-18        

 

                          Serum or plasma creatinine measurement (mass/volume)              0.75 mg/dL      

                                        0.60-1.30        

 

                    Serum or plasma urea nitrogen/creatinine mass ratio              7                   NRG

        

 

                                        Serum or plasma creatinine measurement with calculation of estimated glomerular 

filtration rate              >                         NRG        

 

                          Serum or plasma glucose measurement (mass/volume)              84 mg/dL           

                                                

 

                          Serum or plasma calcium measurement (mass/volume)              8.4 mg/dL          

                                        8.5-10.1        

 

                          Serum or plasma total bilirubin measurement (mass/volume)              0.4 mg/dL  

                                        0.1-1.0        

 

                                        Serum or plasma alkaline phosphatase measurement (enzymatic activity/volume)    

                          67 U/L                            

 

                                        Serum or plasma aspartate aminotransferase measurement (enzymatic activity/volume)

                          44 U/L                    5-34        

 

                                        Serum or plasma alanine aminotransferase measurement (enzymatic activity/volume)

                          76 U/L                    0-55        

 

                          Serum or plasma protein measurement (mass/volume)              6.4 g/dL           

                                        6.4-8.2        

 

                          Serum or plasma albumin measurement (mass/volume)              3.5 g/dL           

                                        3.2-4.5        









                                        Complete blood count (CBC) with automated white blood cell (WBC) differential - 

17 05:08         









                          Blood leukocytes automated count (number/volume)              6.6 10*3/uL         

                                        4.3-11.0        

 

                          Blood erythrocytes automated count (number/volume)              3.99 10*6/uL      

                                        4.35-5.85        

 

                          Venous blood hemoglobin measurement (mass/volume)              11.5 g/dL          

                                        11.5-16.0        

 

                    Blood hematocrit (volume fraction)              35 %                35-52        

 

                    Automated erythrocyte mean corpuscular volume              87 [foz_us]              

80-99        

 

                                        Automated erythrocyte mean corpuscular hemoglobin (mass per erythrocyte)        

                          29 pg                     25-34        

 

                                        Automated erythrocyte mean corpuscular hemoglobin concentration measurement (mass/volume)

                          33 g/dL                   32-36        

 

                    Automated erythrocyte distribution width ratio              13.1 %              10.0-

14.5        

 

                    Automated blood platelet count (count/volume)              279 10*3/uL              

130-400        

 

                          Automated blood platelet mean volume measurement              9.8 [foz_us]        

                                        7.4-10.4        

 

                    Automated blood neutrophils/100 leukocytes              62 %                42-75     

   

 

                    Automated blood lymphocytes/100 leukocytes              29 %                12-44     

   

 

                    Blood monocytes/100 leukocytes              6 %                 0-12        

 

                    Automated blood eosinophils/100 leukocytes              3 %                 0-10       

 

 

                    Automated blood basophils/100 leukocytes              0 %                 0-10        

 

                          Blood neutrophils automated count (number/volume)              4.1 10*3           

                                        1.8-7.8        

 

                          Blood lymphocytes automated count (number/volume)              1.9 10*3           

                                        1.0-4.0        

 

                    Blood monocytes automated count (number/volume)              0.4 10*3              0.0-

1.0        

 

                    Automated eosinophil count              0.2 10*3/uL              0.0-0.3        

 

                    Automated blood basophil count (count/volume)              0.0 10*3/uL              

0.0-0.1        









                                        Comprehensive metabolic panel - 17 05:08         









                          Serum or plasma sodium measurement (moles/volume)              138 mmol/L         

                                        135-145        

 

                          Serum or plasma potassium measurement (moles/volume)              4.3 mmol/L      

                                        3.6-5.0        

 

                          Serum or plasma chloride measurement (moles/volume)              105 mmol/L       

                                                

 

                    Carbon dioxide              26 mmol/L              21-32        

 

                          Serum or plasma anion gap determination (moles/volume)              7 mmol/L      

                                        5-14        

 

                          Serum or plasma urea nitrogen measurement (mass/volume)              5 mg/dL      

                                        7-18        

 

                          Serum or plasma creatinine measurement (mass/volume)              0.70 mg/dL      

                                        0.60-1.30        

 

                    Serum or plasma urea nitrogen/creatinine mass ratio              7                   NRG

        

 

                                        Serum or plasma creatinine measurement with calculation of estimated glomerular 

filtration rate              >                         NRG        

 

                          Serum or plasma glucose measurement (mass/volume)              83 mg/dL           

                                                

 

                          Serum or plasma calcium measurement (mass/volume)              8.6 mg/dL          

                                        8.5-10.1        

 

                          Serum or plasma total bilirubin measurement (mass/volume)              0.2 mg/dL  

                                        0.1-1.0        

 

                                        Serum or plasma alkaline phosphatase measurement (enzymatic activity/volume)    

                          77 U/L                            

 

                                        Serum or plasma aspartate aminotransferase measurement (enzymatic activity/volume)

                          45 U/L                    5-34        

 

                                        Serum or plasma alanine aminotransferase measurement (enzymatic activity/volume)

                          61 U/L                    0-55        

 

                          Serum or plasma protein measurement (mass/volume)              6.2 g/dL           

                                        6.4-8.2        

 

                          Serum or plasma albumin measurement (mass/volume)              3.2 g/dL           

                                        3.2-4.5        









                                        Acute hepatitis panel - 17 05:08         









                                        Confirmatory quantitative serum or plasma hepatitis B virus surface antigen measurement

                          Non-Reactive               Non-Reactive        

 

                    Hepatitis A virus IgM antibody assay              Non-Reactive               Non-Reactive

        

 

                    Hepatitis B virus core IgM antibody assay              Non-Reactive               Non-

Reactive        

 

                    Serum hepatitis C virus antibody detection              Non-Reactive               Non-

Reactive        









                                        Thyroid Stimulating Hormone - 18 13:16         









                    TSH                 5.20 mIU/mL              0.32-5.00        









                                        Complete urinalysis with reflex to culture - 18 13:48         









                    Urine color determination              YELLOW               NRG        

 

                    Urine clarity determination              SLIGHTLY CLOUDY               NRG        

 

                    Urine pH measurement by test strip              8                   5-9        

 

                    Specific gravity of urine by test strip              1.015               1.016-1.022

        

 

                          Urine protein assay by test strip, semi-quantitative              NEGATIVE        

                                        NEGATIVE        

 

                    Urine glucose detection by automated test strip              NEGATIVE               

NEGATIVE        

 

                          Erythrocytes detection in urine sediment by light microscopy              NEGATIVE

                                        NEGATIVE        

 

                    Urine ketones detection by automated test strip              NEGATIVE               

NEGATIVE        

 

                    Urine nitrite detection by test strip              NEGATIVE               NEGATIVE  

      

 

                    Urine total bilirubin detection by test strip              NEGATIVE               NEGATIVE

        

 

                          Urine urobilinogen measurement by automated test strip (mass/volume)              

NORMAL                                  NORMAL        

 

                    Urine leukocyte esterase detection by dipstick              NEGATIVE               NEGATIVE

        

 

                                        Automated urine sediment erythrocyte count by microscopy (number/high power field)

                          NONE                      NRG        

 

                                        Automated urine sediment leukocyte count by microscopy (number/high power field)

                          NONE                      NRG        

 

                          Bacteria detection in urine sediment by light microscopy              NEGATIVE    

                                        NRG        

 

                                        Squamous epithelial cells detection in urine sediment by light microscopy       

                          5-10                      NRG        

 

                          Crystals detection in urine sediment by light microscopy              PRESENT     

                                        NRG        

 

                          Casts detection in urine sediment by light microscopy              NONE           

                                        NRG        

 

                          Mucus detection in urine sediment by light microscopy              NEGATIVE       

                                        NRG        

 

                    Complete urinalysis with reflex to culture              NO                  NRG        



 

                          Amorphous sediment detection in urine sediment by light microscopy              MOD

  PHOSPHATE                         NRG        









                                        Urine beta human chorionic gonadotropin (hCG) measurement - 18 13:48      

   









                          Urine beta human chorionic gonadotropin (hCG) measurement              NEGATIVE   

                                        NEGATIVE        









                                        Complete blood count (CBC) with automated white blood cell (WBC) differential - 

18 14:25         









                          Blood leukocytes automated count (number/volume)              6.5 10*3/uL         

                                        4.3-11.0        

 

                          Blood erythrocytes automated count (number/volume)              4.31 10*6/uL      

                                        4.35-5.85        

 

                          Venous blood hemoglobin measurement (mass/volume)              13.1 g/dL          

                                        11.5-16.0        

 

                    Blood hematocrit (volume fraction)              38 %                35-52        

 

                    Automated erythrocyte mean corpuscular volume              89 [foz_us]              

80-99        

 

                                        Automated erythrocyte mean corpuscular hemoglobin (mass per erythrocyte)        

                          30 pg                     25-34        

 

                                        Automated erythrocyte mean corpuscular hemoglobin concentration measurement (mass/volume)

                          34 g/dL                   32-36        

 

                    Automated erythrocyte distribution width ratio              11.9 %              10.0-

14.5        

 

                    Automated blood platelet count (count/volume)              300 10*3/uL              

130-400        

 

                          Automated blood platelet mean volume measurement              9.1 [foz_us]        

                                        7.4-10.4        

 

                    Automated blood neutrophils/100 leukocytes              54 %                42-75     

   

 

                    Automated blood lymphocytes/100 leukocytes              37 %                12-44     

   

 

                    Blood monocytes/100 leukocytes              6 %                 0-12        

 

                    Automated blood eosinophils/100 leukocytes              3 %                 0-10       

 

 

                    Automated blood basophils/100 leukocytes              0 %                 0-10        

 

                          Blood neutrophils automated count (number/volume)              3.5 10*3           

                                        1.8-7.8        

 

                          Blood lymphocytes automated count (number/volume)              2.4 10*3           

                                        1.0-4.0        

 

                    Blood monocytes automated count (number/volume)              0.4 10*3              0.0-

1.0        

 

                    Automated eosinophil count              0.2 10*3/uL              0.0-0.3        

 

                    Automated blood basophil count (count/volume)              0.0 10*3/uL              

0.0-0.1        









                                        Comprehensive metabolic panel - 18 14:25         









                          Serum or plasma sodium measurement (moles/volume)              138 mmol/L         

                                        135-145        

 

                          Serum or plasma potassium measurement (moles/volume)              3.8 mmol/L      

                                        3.6-5.0        

 

                          Serum or plasma chloride measurement (moles/volume)              112 mmol/L       

                                                

 

                    Carbon dioxide              22 mmol/L              21-32        

 

                          Serum or plasma anion gap determination (moles/volume)              4 mmol/L      

                                        5-14        

 

                          Serum or plasma urea nitrogen measurement (mass/volume)              11 mg/dL     

                                        7-18        

 

                          Serum or plasma creatinine measurement (mass/volume)              0.73 mg/dL      

                                        0.60-1.30        

 

                    Serum or plasma urea nitrogen/creatinine mass ratio              15                  NRG

        

 

                                        Serum or plasma creatinine measurement with calculation of estimated glomerular 

filtration rate              >                         NRG        

 

                          Serum or plasma glucose measurement (mass/volume)              99 mg/dL           

                                                

 

                          Serum or plasma calcium measurement (mass/volume)              8.9 mg/dL          

                                        8.5-10.1        

 

                          Serum or plasma total bilirubin measurement (mass/volume)              0.5 mg/dL  

                                        0.1-1.0        

 

                                        Serum or plasma alkaline phosphatase measurement (enzymatic activity/volume)    

                          47 U/L                            

 

                                        Serum or plasma aspartate aminotransferase measurement (enzymatic activity/volume)

                          17 U/L                    5-34        

 

                                        Serum or plasma alanine aminotransferase measurement (enzymatic activity/volume)

                          13 U/L                    0-55        

 

                          Serum or plasma protein measurement (mass/volume)              7.3 g/dL           

                                        6.4-8.2        

 

                          Serum or plasma albumin measurement (mass/volume)              3.9 g/dL           

                                        3.2-4.5        









                                        Comprehensive Metabolic Panel - 18 19:25         









                    Albumin              3.8 g/dL              3.6-5.1        

 

                    ALP                 63 U/L                      

 

                    ALT                 23 U/L              6-45        

 

                    Anion Gap              13                  6-14        

 

                    AST                 25 U/L              2-40        

 

                    BUN                 13 mg/dL              5-25        

 

                    Calcium              8.8 mg/dL              8.3-10.4        

 

                    Chloride              109 mmol/L                      

 

                    CO2                 21 mEq/L              22-33        

 

                    Creat               0.77 mg/dL              0.50-1.50        

 

                    eGFR                89 mL/min/1.73m2              >59        

 

                    Globulin              3.3 g/dL              2.3-3.5        

 

                    Glucose              90 mg/dL                      

 

                    Osmo                287                 280-295        

 

                    Potassium              3.8 mmol/L              3.5-5.3        

 

                    Sodium              139 mmol/L              134-148        

 

                    TBil                0.3 mg/dL              0.2-1.2        

 

                    TP                  7.1 g/dL              6.0-8.3        









                                        Lipase - 18 19:25         









                    Lipase              47 U/L              7-59        









                                        Urinalysis - 18 19:25         









                    Icotest              N/A                 Negative        

 

                    Urine Volume              Urine Volume Sufficient (10mL)                        

 

                    Urine Yeast              No Yeast present                        

 

                    Urine-Appearance              Slightly Cloudy               Clear        

 

                    Urine-Bacteria              2+                           

 

                    Urine-Bilirubin              Negative               Negative        

 

                    Urine-Blood              Trace-intact               Negative        

 

                    Urine-Color              Yellow               Colorless-Lt. Yellow        

 

                    Urine-Epithelial Cells              TNTC                         

 

                    Urine-Glucose              Negative               Negative        

 

                    Urine-Ketones              Negative               Negative        

 

                    Urine-Leukocytes              1+                  Negative        

 

                    Urine-Mucus              3+                           

 

                    Urine-Nitrite              Negative               Negative        

 

                    Urine-Other              Trichomonas                        

 

                    Urine-pH              5.5                 5-8.5        

 

                    Urine-Protein              1+                  Negative        

 

                    Urine-RBC              2-5/HPF                        

 

                    Urine-Specific Gravity              >=1.030               1.000-1.030        

 

                    Urine-WBC              5-10/HPF                        

 

                    Urobilinogen              0.2                 0.2-1.0        









                                        Urine Culture - 18 19:25         









                    PRELIM CULTURE RESULTS              No Growth 24 hours                        

 

                          FINAL CULTURE RESULTS              <10,000 Mixed Aysha Probable Skin Contaminant No

 Further Workup done                             

 

                    MEDIA PLATED              Setup at 21:22 on 2018                        

 

                    CULTURE SOURCE              VOID                         









                                        Protime  - 18 07:27         









                    INR                 1.0                 1.0-4.0        

 

                    Protime              11.8 Sec              9.9-12.8        









                                        Surgical Pathology - 18 08:31         









                    Surg Path              Sent to [facility]                        









                                        Complete blood count (CBC) with automated white blood cell (WBC) differential - 

19 19:48         









                          Blood leukocytes automated count (number/volume)              12.4 10*3/uL        

                                        4.3-11.0        

 

                          Blood erythrocytes automated count (number/volume)              4.61 10*6/uL      

                                        4.35-5.85        

 

                          Venous blood hemoglobin measurement (mass/volume)              13.6 g/dL          

                                        11.5-16.0        

 

                    Blood hematocrit (volume fraction)              39 %                35-52        

 

                    Automated erythrocyte mean corpuscular volume              85 [foz_us]              

80-99        

 

                                        Automated erythrocyte mean corpuscular hemoglobin (mass per erythrocyte)        

                          30 pg                     25-34        

 

                                        Automated erythrocyte mean corpuscular hemoglobin concentration measurement (mass/volume)

                          35 g/dL                   32-36        

 

                    Automated erythrocyte distribution width ratio              12.3 %              10.0-

14.5        

 

                    Automated blood platelet count (count/volume)              335 10*3/uL              

130-400        

 

                          Automated blood platelet mean volume measurement              9.2 [foz_us]        

                                        7.4-10.4        

 

                    Automated blood neutrophils/100 leukocytes              65 %                42-75     

   

 

                    Automated blood lymphocytes/100 leukocytes              25 %                12-44     

   

 

                    Blood monocytes/100 leukocytes              9 %                 0-12        

 

                    Automated blood eosinophils/100 leukocytes              1 %                 0-10       

 

 

                    Automated blood basophils/100 leukocytes              0 %                 0-10        

 

                          Blood neutrophils automated count (number/volume)              8.1 10*3           

                                        1.8-7.8        

 

                          Blood lymphocytes automated count (number/volume)              3.1 10*3           

                                        1.0-4.0        

 

                    Blood monocytes automated count (number/volume)              1.1 10*3              0.0-

1.0        

 

                    Automated eosinophil count              0.1 10*3/uL              0.0-0.3        

 

                    Automated blood basophil count (count/volume)              0.0 10*3/uL              

0.0-0.1        









                                        Comprehensive metabolic panel - 19 19:48         









                          Serum or plasma sodium measurement (moles/volume)              139 mmol/L         

                                        135-145        

 

                          Serum or plasma potassium measurement (moles/volume)              3.4 mmol/L      

                                        3.6-5.0        

 

                          Serum or plasma chloride measurement (moles/volume)              107 mmol/L       

                                                

 

                    Carbon dioxide              19 mmol/L              21-32        

 

                          Serum or plasma anion gap determination (moles/volume)              13 mmol/L     

                                        5-14        

 

                          Serum or plasma urea nitrogen measurement (mass/volume)              19 mg/dL     

                                        7-18        

 

                          Serum or plasma creatinine measurement (mass/volume)              1.06 mg/dL      

                                        0.60-1.30        

 

                    Serum or plasma urea nitrogen/creatinine mass ratio              18                  NRG

        

 

                                        Serum or plasma creatinine measurement with calculation of estimated glomerular 

filtration rate              >                         NRG        

 

                          Serum or plasma glucose measurement (mass/volume)              87 mg/dL           

                                                

 

                          Serum or plasma calcium measurement (mass/volume)              9.7 mg/dL          

                                        8.5-10.1        

 

                          Serum or plasma total bilirubin measurement (mass/volume)              1.4 mg/dL  

                                        0.1-1.0        

 

                                        Serum or plasma alkaline phosphatase measurement (enzymatic activity/volume)    

                          69 U/L                            

 

                                        Serum or plasma aspartate aminotransferase measurement (enzymatic activity/volume)

                          20 U/L                    5-34        

 

                                        Serum or plasma alanine aminotransferase measurement (enzymatic activity/volume)

                          15 U/L                    0-55        

 

                          Serum or plasma protein measurement (mass/volume)              7.8 g/dL           

                                        6.4-8.2        

 

                          Serum or plasma albumin measurement (mass/volume)              4.2 g/dL           

                                        3.2-4.5        

 

                    CALCIUM CORRECTED              9.5 mg/dL              8.5-10.1        









                                        Serum or plasma C reactive protein measurement (mass/volume) - 19 19:48   

      









                          Serum or plasma C reactive protein measurement (mass/volume)              0.20 mg/dL

                                        0.00-0.50        









                                        Serum or plasma salicylates measurement (mass/volume) - 19 19:48         









                          Serum or plasma salicylates measurement (mass/volume)              < mg/dL        

                                        5.0-20.0        









                                        Serum or plasma acetaminophen measurement (mass/volume) - 19 19:48        

 









                          Serum or plasma acetaminophen measurement (mass/volume)              < ug/mL      

                                        10-30        









                                        Serum or plasma ethanol measurement (mass/volume) - 19 19:48         









                    Serum or plasma ethanol measurement (mass/volume)              < mg/dL              

<10        









                                        Complete urinalysis with reflex to culture - 19 20:28         









                    Urine color determination              IVAN               NRG        

 

                    Urine clarity determination              CLEAR               NRG        

 

                    Urine pH measurement by test strip              6                   5-9        

 

                    Specific gravity of urine by test strip              1.020               1.016-1.022

        

 

                    Urine protein assay by test strip, semi-quantitative              2+                  NEGATIVE

        

 

                    Urine glucose detection by automated test strip              NEGATIVE               

NEGATIVE        

 

                          Erythrocytes detection in urine sediment by light microscopy              NEGATIVE

                                        NEGATIVE        

 

                    Urine ketones detection by automated test strip              2+                  NEGATIVE

        

 

                    Urine nitrite detection by test strip              NEGATIVE               NEGATIVE  

      

 

                    Urine total bilirubin detection by test strip              NEGATIVE               NEGATIVE

        

 

                          Urine urobilinogen measurement by automated test strip (mass/volume)              

NORMAL                                  NORMAL        

 

                    Urine leukocyte esterase detection by dipstick              1+                  NEGATIVE

        

 

                                        Automated urine sediment erythrocyte count by microscopy (number/high power field)

                          NONE                      NRG        

 

                                        Automated urine sediment leukocyte count by microscopy (number/high power field)

                           [HPF]                    NRG        

 

                          Bacteria detection in urine sediment by light microscopy              FEW         

                                        NRG        

 

                                        Squamous epithelial cells detection in urine sediment by light microscopy       

                          5-10                      NRG        

 

                          Crystals detection in urine sediment by light microscopy              NONE        

                                        NRG        

 

                          Casts detection in urine sediment by light microscopy              NONE           

                                        NRG        

 

                          Mucus detection in urine sediment by light microscopy              LARGE          

                                        NRG        

 

                    Complete urinalysis with reflex to culture              YES                 NRG       

 









                                        Urine drug screening test - 19 20:28         









                          Urine phencyclidine detection by screening method              NEGATIVE           

                                        NEGATIVE        

 

                          Urine benzodiazepines detection by screening method              NEGATIVE         

                                        NEGATIVE        

 

                    Urine cocaine detection              NEGATIVE               NEGATIVE        

 

                          Urine amphetamines detection by screening method              POSITIVE            

                                        NEGATIVE        

 

                          Urine methamphetamine detection by screening method              POSITIVE         

                                        NEGATIVE        

 

                          Urine cannabinoids detection by screening method              NEGATIVE            

                                        NEGATIVE        

 

                    Urine opiates detection by screening method              NEGATIVE               NEGATIVE

        

 

                    Urine barbiturates detection              POSITIVE               NEGATIVE        

 

                          Screening urine tricyclic antidepressants detection              NEGATIVE         

                                        NEGATIVE        

 

                    Urine methadone detection by screening method              NEGATIVE               NEGATIVE

        

 

                    Urine oxycodone detection              NEGATIVE               NEGATIVE        

 

                    Urine propoxyphene detection              NEGATIVE               NEGATIVE        



                                                                                
            



Encounters

      





                ACCT No.              Visit Date/Time              Discharge              Status      

                Pt. Type              Provider              Facility              Loc./Unit      

                                        Complaint        

 

                [id number]              2013 15:28:00              2013 23:59:59              

[facility]              Outpatient              ESTIVEN MOSER DO                                 

                                                 

 

                [id number]              2013 08:32:00              2013 23:59:59              

[facility]              Outpatient              ESTIVEN MOSER DO                                 

                                                 

 

                [id number]              2013 15:18:00              2013 23:59:59              

CLS                 Outpatient              ANDREW ROACH                          

                                                             

 

                [id number]              10/10/2013 13:29:00              10/10/2013 23:59:59              

CLS                 Outpatient              TRISTA PEACE                     

                                                             

 

                [id number]              2013 10:18:00              2013 23:59:59              

CLS              Outpatient              AGA JOHNSON MD                                

                                                 

 

                [id number]              2013 15:17:00              2013 23:59:59              

CLS                 Outpatient              TRISTA PEACE                     

                                                             

 

                [id number]              2012 14:55:00              2012 23:59:59              

CLS                 Outpatient              [facility]                      

                                                             

 

                [id number]              10/24/2012 15:07:00              10/24/2012 23:59:59              CLS

                    Outpatient              [facility]                         

                                                             

 

             [id number]              2013 10:28:00                                            Document

 Registration                                                                       

 

             [id number]              2013 13:27:00                                            Document

 Registration                                                                       

 

             [id number]              2013 14:33:00                                            Document

 Registration                                                                       

 

                    [id number]              2018 08:48:00              2018 23:59:59      

                CLS              Outpatient              CADENCE MATTSON              [facility]              CARD                      RUQ ABD PAIN        

 

                    [id number]              2018 13:43:00              2018 16:30:00      

                DIS              Emergency              ROLAN PAVON, CHRISTIANA BUENO              [facility]              ER                        RIGHT SIDE PAIN DIARRHEA VOMITING 

FEVER        

 

                    [id number]              2017 09:05:00              2017 23:59:59      

                CLS              Outpatient              EDELMIRA CHENG MD              [facility]              RAD                       LUMBAR PAIN W/RADICULOPATHY      

  

 

                    [id number]              06/15/2017 14:23:00              06/15/2017 23:59:59      

                CLS              Outpatient              EDELMIRA CHENG MD              [facility]              RAD                       CERVICAL THORACIC AND LUMBAR PAIN

        

 

                    [id number]              2017 09:00:00              2017 23:59:59      

                CLS              Preadmit              ADONAY JAMES DO              [facility]              RAD                       R10.11        

 

                    [id number]              2017 13:29:00              2017 08:33:00      

                DIS              Inpatient              ADONAY JAMES DO              [facility]              4TH                       CELLULITIS FACE/DENTAL ABSCESS   

     

 

                    [id number]              2017 06:07:00              2017 13:48:00      

                DIS              Outpatient              CARLINE VILLA DO              [facility]              SDC                       CPP;ENDOMETRIOSIS        

 

                    [id number]              2017 10:05:00              2017 11:46:00      

                DIS              Outpatient              CARLINE VILLA DO              [facility]              PREOP                     CPP;ENDOMETRIOSIS        

 

                    [id number]              2016 15:41:00              2016 13:10:00      

                DIS              Inpatient              CARLINE VILLA DO              [facility]              LDRP                      REPEAT         

 

                    [id number]              10/24/2016 15:17:00              10/24/2016 17:30:00      

                DIS              Outpatient              BETSY TOVAR MD              [facility]              WSo                       CONTRACTIONS        

 

                    [id number]              10/05/2016 19:26:00              10/05/2016 20:55:00      

                DIS              Outpatient              JUSTIN PAVON, ANNA BENITEZ              [facility]              WSo                       CONTRACTIONS        

 

                    [id number]              2016 08:15:00              2016 23:59:59      

                CLS              Outpatient              CARLINE VILLA DO              [facility]              RAD                       ABDOMINAL PAIN IN PREGNANCY      

  

 

                    [id number]              2016 13:32:00              2016 23:59:59      

                CLS              Outpatient              CARLINE VILLA DO              [facility]              RAD                       ABDOMINAL PAIN, PALPABLE ABDOMINAL

 AORTA        

 

                    [id number]              04/10/2016 22:37:00              2016 00:58:00      

                DIS              Emergency              AZIZA SNELL              [facility]              ER                        ABD PAIN/BLEEDING 7 WEEKS PREGNANT

        

 

                    [id number]              2016 09:04:00              2016 23:59:59      

                CLS              Outpatient              KELLIE ADAMS              [facility]              OCC                                

 

                    [id number]              2016 11:19:00              2016 15:04:00      

                DIS              Emergency              AZIZA SNELL              [facility]              ER                        ABD/BACK PAIN;POSSIBLY PREGNANT   

     

 

                    [id number]              2014 20:30:00              2014 14:20:00      

                DIS              Inpatient              CARLINE VILLA DO              [facility]              LDRP                               

 

                    [id number]              2014 22:12:00              2014 00:40:00      

                DIS              Outpatient              CAR[facility] VILLA DO              [facility]                                

 

                    [id number]              2014 13:52:00              2014 14:35:00      

                DIS              Outpatient              CARLINE VILLA DO              [facility]                                

 

                    [id number]              2014 19:09:00              2014 22:05:00      

                DIS              Outpatient              CARLINE VILLA DO              [facility]                                

 

                    [id number]              2014 14:48:00              2014 16:35:00      

                DIS              Outpatient              CARLINE VILLA DO              [facility]                                

 

                    [id number]              2014 15:40:00              2014 18:30:00      

                DIS              Inpatient              [facility]ILL[facility] DO              [facility]        

 

                    [id number]              2013 01:11:00              2013 03:23:00      

                DIS              Emergency              JACKELIN BYRD DO              [facility]              ER                                 

 

                    [id number]              10/14/2013 23:50:00              10/16/2013 13:40:00      

           DIS              Inpatient                                                           

         

 

                    [id number]              2013 19:33:00              2013 23:59:59      

             CLS              Outpatient                                                        

                                                 

 

                [id number]              2019 00:56:00                              ACT         

                    Inpatient              CHRISTIANA GONGORA MD              [facility]

                          ICU                       METHANPHETAMINE ABUSE/OVERDOSE        

 

                [id number]              2019 16:53:00                              ACT         

                    Emergency              CHARO LORENZO MD              [facility]               ER                        ASSAULT        

 

                [id number]              2016 16:20:00                                          

             Document Registration                                                                   

 

 

                [id number]              2016 11:18:00                                          

             Document Registration                                                                   

 

 

                [id number]              2013 20:51:00                                          

             Document Registration                                                                   

 

 

                [id number]              2012 18:40:00                                          

             Document Registration                                                                   

 

 

                [id number]              2012 17:48:00                                          

             Document Registration                                                                   

 

 

                [id number]              2011 11:04:00                                          

             Document Registration                                                                   

 

 

                [id number]              2011 19:14:00                                          

             Document Registration                                                                   

 

 

                [id number]              2018 06:07:00              2018 09:17:00              

DIS                 Outpatient              Vance Arnold                         

                                                             

 

                [id number]              2018 12:55:00              2018 23:59:00              

DIS              Outpatient              EDELMIRA CHENG                                  

                                                 

 

                [id number]              2018 19:00:00              2018 23:13:00              

DIS              Outpatient              CHARO SEAY                                     

                                                 

 

                [id number]              2018 00:00:00              2018 23:59:00              

DIS                 Outpatient              Vance Arnold                         

                                                             

 

                [id number]              2018 13:15:00              2018 23:59:00              

DIS              Outpatient              EDELMIRA CHENG                                  

                                                 

 

             [id number]              2018 08:22:34                                            Document

 Registration                                                                       

 

             [id number]              01/10/2018 06:33:04                                            Document

 Registration

## 2019-06-04 NOTE — DIAGNOSTIC IMAGING REPORT
PROCEDURE: CT head without contrast.



TECHNIQUE: Multiple contiguous axial images were obtained through

the brain without the use of intravenous contrast. Auto Exposure

Controls were utilized during the CT exam to meet ALARA standards

for radiation dose reduction. 



INDICATION:  Altered mental status, headache



There is no mass, shift of midline or hemorrhage to suggest an

acute intracranial abnormality. The ventricles are not abnormally

dilated and stable in size compared to prior exam of 05/25/2012.

The bone windows show no evidence for a fracture or for

destructive lesion. The orbits are symmetrical and within normal

limits. The sinuses are generally clear.



IMPRESSION:

1. There is no evidence for an acute intracranial abnormality.

2. If clinical concern regarding an underlying abnormality

persists, then MRI would be recommended for further study.



Dictated by: 



  Dictated on workstation # [id number]
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
## 2019-06-07 NOTE — XMS REPORT
Continuity of Care Document

                             Created on: 2019



JORGE LOVE

External Reference #: [id number]

: 1990

Sex: Female



Demographics







                          Address                    [address]

[place]  [address]

 

                          Home Phone                [phone] x

 

                          Preferred Language        Unknown

 

                          Marital Status            Unknown

 

                          Yazidism Affiliation     Unknown

 

                          Race                      Unknown

 

                          Ethnic Group              Unknown





Author







                          Organization              Unknown

 

                          Address                   Unknown

 

                          Phone                     [phone]



              



Allergies

      





             Active              Description              Code              Type              Severity

                Reaction              Onset              Reported/Identified              Relationship

 to Patient                             Clinical Status        

 

             Yes              CYCLOBENZAPRINE                                            UNKNOWN     

                GI PROBLEMS - NAUSEA                                                      

                                                 

 

             Yes              NO KNOWN DRUG ALLERGIES                                            UNKNOWN

                NO KNOWN DRUG ALLERG                                                 

                                                 

 

                Yes              hydromorphone HCl              [id number]              Drug Allergy  

             Mild              ITCHING                             10/15/2013               

                                                 

 

                Yes              No Known Drug Allergies              [id number]              Drug Allergy

              Unknown              N/A                             2017              

                                                 



                        



Medications

      





                Medication              Packaging              Start Date              Stop Date      

                    Route               Dosage              Sig        

 

                                      LACTATED RINGERS 1000CC IV BAG INJ                        ml          

             2018                                                     

   CONTINUOUSEVERY 0 Hour                  

 

                                                    ONDANSETRON VIAL INJ 4 MG/2CC (ZOFRAN 2CC VIAL)                     

             MG              2018                                      

                                                    PRN ONCE                  

 

                                      Hydromorphone  inj 2mg/cc vial (Dilaudid)                        MG   

             2018                                              

          PRN ONCE                  

 

                                      LACTATED RINGERS 1000CC IV BAG INJ                        ml          

             2018                                                     

   ONCE&1925                  

 

                                      LACTATED RINGERS 1000CC IV BAG INJ                        ml          

             2018                                                     

   ONCE&0                  

 

                                      Hydromorphone  inj 2mg/cc vial (Dilaudid)                        MG   

             2018                                              

          ONCE&                  

 

                                TRAMADOL TAB 50 MG (ULTRAM)                        MG                  2018                                                          ONCE&211

                  

 

                                      LACTATED RINGERS 1000CC IV BAG INJ                        ml          

             2018                                                     

   CONTINUOUSEVERY 0 Hour                  



                                



Problems

      





             Date Dx Coded              Attending              Type              Code              Diagnosis

                                        Diagnosed By        

 

                2010              YAS DIAZ PSYD                              244.9    

                          HYPOTHYROIDISM                       

 

                2010              YAS DIAZ PSYD                              278.00   

                          OBESITY                            

 

                2010              YAS DIAZ PSYD                              V25.01   

                          Oral Contraceptives                       

 

                2010              TRISTA PEACE                              244.9   

                          HYPOTHYROIDISM                       

 

                2010              TRISTA PEACE                              278.00  

                          Obesity                            

 

                2010              FRANCI PEACEIA R                              V25.01  

                          Oral Contraceptives                       

 

                2010              AGA JOHNSON MD                              244.9           

                          HYPOTHYROIDISM                       

 

                2010              AGA JOHNSON MD                              278.00          

                          Obesity                            

 

                2010              AGA JOHNSON MD                              V25.01          

                          Oral Contraceptives                       

 

             2010                                            244.9              HYPOTHYROIDISM 

                                                 

 

             2010                                            278.00              Obesity       

                                                 

 

             2010                                            V25.01              Oral Contraceptives

                                                 

 

             2010                                            244.9              HYPOTHYROIDISM 

                                                 

 

             2010                                            278.00              Obesity       

                                                 

 

             2010                                            V25.01              Oral Contraceptives

                                                 

 

             2010                                            244.9              HYPOTHYROIDISM 

                                                 

 

             2010                                            278.00              Obesity       

                                                 

 

             2010                                            V25.01              Oral Contraceptives

                                                 

 

                2010              TRISTA PEACE R                              244.9   

                          HYPOTHYROIDISM                       

 

                2010              FRANCI PECAEIA R                              278.00  

                          Obesity                            

 

                2010              FRANCI PEACEIA R                              V25.01  

                          Oral Contraceptives                       

 

                2010              ANDREW ROACH T                              244.9        

                          HYPOTHYROIDISM                       

 

                2010              ANDREW ROACH T                              278.00       

                          Obesity                            

 

                2010              ESTELITA JHA ANDREW T                              V25.01       

                          Oral Contraceptives                       

 

             2010              MOSER DO, ESTIVEN K                             244.9              

HYPOTHYROIDISM                                   

 

                2010              MOSER DO, ESTIVEN K                              278.00           

                          Obesity                            

 

                2010              MOSER DO, ESTIVEN K                              V25.01           

                          Oral Contraceptives                       

 

             2010              MOSER DO, ESTIVEN K                             244.9              

HYPOTHYROIDISM                                   

 

                2010              MOSER DO, ESTIVEN K                              278.00           

                          Obesity                            

 

                2010              MOSER DO, ESTIVEN K                              V25.01           

                          Oral Contraceptives                       

 

                2010              YAS DIAZ PSYD L                              244.9    

                          HYPOTHYROIDISM                       

 

                2010              YAS DIAZ PSYD ANN L                              278.00   

                          OBESITY                            

 

                2010              YAS DIAZ PSYD L                              V25.01   

                          Oral Contraceptives                       

 

                2010              YAS DIAZ PSYD L                              V25.49   

                          SURVEILLANCE OF OTHER CONTRACEPTIVE METHOD                       

 

                2010              TRISTA PEACE R                              V25.49  

                          SURVEILLANCE OF OTHER CONTRACEPTIVE METHOD                       

 

                2010              AGA JOHNSON MD                              V25.49          

                          SURVEILLANCE OF OTHER CONTRACEPTIVE METHOD                       

 

             2010                                            V25.49              SURVEILLANCE OF

 OTHER CONTRACEPTIVE METHOD                       

 

             2010                                            V25.49              SURVEILLANCE OF

 OTHER CONTRACEPTIVE METHOD                       

 

             2010                                            V25.49              SURVEILLANCE OF

 OTHER CONTRACEPTIVE METHOD                       

 

                2010              TRISTA PEACE                              V25.49  

                          SURVEILLANCE OF OTHER CONTRACEPTIVE METHOD                       

 

                2010              ANDREW ROACH                              V25.49       

                          SURVEILLANCE OF OTHER CONTRACEPTIVE METHOD                       

 

                2010              ESTIVEN MOSER DO K                              V25.49           

                          SURVEILLANCE OF OTHER CONTRACEPTIVE METHOD                       

 

                2010              ESTIVEN MOSER DO K                              V25.49           

                          SURVEILLANCE OF OTHER CONTRACEPTIVE METHOD                       

 

                2010              YAS DIAZ PSYD                              V25.49   

                          SURVEILLANCE OF OTHER CONTRACEPTIVE METHOD                       

 

                2011              YAS DIAZ PSYD                              611.0    

                          INFLAMMATORY DISEASE OF BREAST                       

 

                2011              TRISTA PEACE                              611.0   

                          INFLAMMATORY DISEASE OF BREAST                       

 

                2011              AGA JOHNSON MD                              611.0           

                          INFLAMMATORY DISEASE OF BREAST                       

 

             2011                                            611.0              INFLAMMATORY DISEASE

 OF BREAST                                       

 

             2011                                            611.0              INFLAMMATORY DISEASE

 OF BREAST                                       

 

             2011                                            611.0              INFLAMMATORY DISEASE

 OF BREAST                                       

 

                2011              TRISTA PEACE                              611.0   

                          INFLAMMATORY DISEASE OF BREAST                       

 

                2011              ANDREW ROACH                              611.0        

                          INFLAMMATORY DISEASE OF BREAST                       

 

             2011              ESTIVEN MOSER DO K                             611.0              

INFLAMMATORY DISEASE OF BREAST                       

 

             2011              ESTIVEN MOSER DO                             611.0              

INFLAMMATORY DISEASE OF BREAST                       

 

                2011              YAS DIAZ PSYD                              611.0    

                          INFLAMMATORY DISEASE OF BREAST                       

 

           2011                             Ot              521.00                             

         

 

           2011                             Ot              525.9                              

        

 

           2011                             Ot              787.03                             

         

 

                2011              YAS DIAZ PSYD                              278.01   

                          OBESITY, MORBID (BMI >40)                       

 

                2011              YAS DIAZ PSYD                              780.79   

                          fatigue                            

 

                2011              TRISTA PEACE                              278.01  

                          OBESITY MORBID                       

 

                2011              TRISTA PEACE                              780.79  

                          fatigue                            

 

                2011              AGA JOHNSON MD                              278.01          

                          OBESITY MORBID                       

 

                2011              AGA JOHNSON MD                              780.79          

                          fatigue                            

 

             2011                                            278.01              OBESITY MORBID

                                                 

 

             2011                                            780.79              fatigue       

                                                 

 

             2011                                            278.01              OBESITY MORBID

                                                 

 

             2011                                            780.79              fatigue       

                                                 

 

             2011                                            278.01              OBESITY MORBID

                                                 

 

             2011                                            780.79              fatigue       

                                                 

 

                2011              TRISTA PEACE R                              278.01  

                          OBESITY MORBID                       

 

                2011              TRISTA PEACE R                              780.79  

                          fatigue                            

 

                2011              ESTELITA ABHIJEETANDREW T                              278.01       

                          OBESITY MORBID                       

 

                2011              ESTELITA PARMARELIANDREW T                              780.79       

                          FATIGUE                            

 

                2011              MOSER DO ESTIVEN K                              278.01           

                          OBESITY MORBID                       

 

                2011              MOSER DO, ESTIVEN K                              780.79           

                          FATIGUE                            

 

                2011              MOSER DO, ESTIVEN K                              278.01           

                          OBESITY MORBID                       

 

                2011              MOSER DO, ESTIVEN K                              780.79           

                          FATIGUE                            

 

                2011              YAS DIAZ PSYD                              278.01   

                          OBESITY, MORBID (BMI >40)                       

 

                2011              YAS DIAZ PSYD                              780.79   

                          fatigue                            

 

                2011              YAS DIAZ PSYD                              616.10   

                          VAGINITIS VULVOVAGINITIS UNSPECIFIED                       

 

                2011              YAS DIAZ PSYD                              V25.09   

                          CONTRACEPTIVE COUNSELING                       

 

                2011              YAS DIAZ PSYD                              V72.31   

                          GYN EXAM, ROUTINE                       

 

                2011              TRISTA PEACE R                              616.10  

                          VAGINITIS VULVOVAGINITIS UNSPECIFIED                       

 

                2011              TRISTA PEACE R                              V25.09  

                          CONTRACEPTIVE COUNSELING                       

 

                2011              TRISTA PEACE                              V72.31  

                          GYN EXAM, ROUTINE                       

 

                2011              AGA JOHNSON MD                              616.10          

                          VAGINITIS VULVOVAGINITIS UNSPECIFIED                       

 

                2011              AGA JOHNSON MD                              V25.09          

                          CONTRACEPTIVE COUNSELING                       

 

                2011              AGA JOHNSON MD                              V72.31          

                          GYN EXAM, ROUTINE                       

 

             2011                                            616.10              VAGINITIS VULVOVAGINITIS

 UNSPECIFIED                                     

 

             2011                                            V25.09              CONTRACEPTIVE 

COUNSELING                                       

 

             2011                                            V72.31              GYN EXAM, ROUTINE

                                                 

 

             2011                                            616.10              VAGINITIS VULVOVAGINITIS

 UNSPECIFIED                                     

 

             2011                                            V25.09              CONTRACEPTIVE 

COUNSELING                                       

 

             2011                                            V72.31              GYN EXAM, ROUTINE

                                                 

 

             2011                                            616.10              VAGINITIS VULVOVAGINITIS

 UNSPECIFIED                                     

 

             2011                                            V25.09              CONTRACEPTIVE 

COUNSELING                                       

 

             2011                                            V72.31              GYN EXAM, ROUTINE

                                                 

 

                2011              TRISTA PEACE R                              616.10  

                          VAGINITIS VULVOVAGINITIS UNSPECIFIED                       

 

                2011              TRISTA PEACE R                              V25.09  

                          CONTRACEPTIVE COUNSELING                       

 

                2011              TRISTA PEACE R                              V72.31  

                          GYN EXAM, ROUTINE                       

 

                2011              ANDREW ROACH                              616.10       

                          VAGINITIS VULVOVAGINITIS UNSPECIFIED                       

 

                2011              ANDREW ROACH                              V25.09       

                          CONTRACEPTIVE COUNSELING                       

 

                2011              ANDREW ROACH                              V72.31       

                          GYN EXAM, ROUTINE                       

 

                2011              MOSER MARIAH MALONEA K                              616.10           

                          VAGINITIS VULVOVAGINITIS UNSPECIFIED                       

 

                2011              MOSER DO ESTIVEN K                              V25.09           

                          CONTRACEPTIVE COUNSELING                       

 

                2011              SHANTI MALONE ESTIVEN K                              V72.31           

                          GYN EXAM, ROUTINE                       

 

                2011              MARIAH MOSER DOA K                              616.10           

                          VAGINITIS VULVOVAGINITIS UNSPECIFIED                       

 

                2011              MOSER MARIAH MALONEA K                              V25.09           

                          CONTRACEPTIVE COUNSELING                       

 

                2011              MOSER MARAIH MALONEA K                              V72.31           

                          GYN EXAM, ROUTINE                       

 

                2011              YAS DIAZ PSYD                              616.10   

                          VAGINITIS VULVOVAGINITIS UNSPECIFIED                       

 

                2011              YAS DIAZ PSYD                              V25.09   

                          CONTRACEPTIVE COUNSELING                       

 

                2011              YAS DIAZ PSYD                              V72.31   

                          GYN EXAM, ROUTINE                       

 

           2011                             Ot              729.1                              

        

 

           2011                             Ot              729.5                              

        

 

           2012                             Ot              616.0                              

        

 

           2012                             Ot              623.8                              

        

 

           2012                             Ot              041.49                             

         

 

           2012                             Ot              564.00                             

         

 

           2012                             Ot              590.80                             

         

 

                2012              YAS DIAZ PSYD                              309.81   

                          AN PTSD                            

 

                2012              TRISTA PEACE                              309.81  

                          AN PTSD                            

 

                2012              ELIZABETH PAVON, AGA                              309.81          

                          AN PTSD                            

 

             2012                                            309.81              AN PTSD       

                                                 

 

             2012                                            309.81              AN PTSD       

                                                 

 

             2012                                            309.81              AN PTSD       

                                                 

 

                2012              TRISTA PEACE R                              309.81  

                          AN PTSD                            

 

                2012              ANDREW ROACH                              309.81       

                          AN PTSD                            

 

                2012              MOSER DO ESTIVEN K                              309.81           

                          AN PTSD                            

 

                2012              MOSER DO ESTIVEN K                              309.81           

                          AN PTSD                            

 

                2012              YAS DIAZ PSYD                              309.81   

                          AN PTSD                            

 

                2012              YAS DIAZ PSYD                              724.5    

                          BACK PAIN, GENERAL                       

 

                2012              YAS DIAZ PSYD                              788.1    

                          DYSURIA                            

 

                2012              TRISTA PEACE R                              724.5   

                          BACK PAIN, GENERAL                       

 

                2012              TRISTA PEACE R                              788.1   

                          pain during urination (dysuria)                       

 

                2012              AGA JOHNSON MD                              724.5           

                          BACK PAIN, GENERAL                       

 

                2012              AGA JOHNSON MD                              788.1           

                          pain during urination (dysuria)                       

 

             2012                                            724.5              BACK PAIN, GENERAL

                                                 

 

             2012                                            788.1              pain during urination

 (dysuria)                                       

 

             2012                                            724.5              BACK PAIN, GENERAL

                                                 

 

             2012                                            788.1              pain during urination

 (dysuria)                                       

 

             2012                                            724.5              BACK PAIN, GENERAL

                                                 

 

             2012                                            788.1              pain during urination

 (dysuria)                                       

 

                2012              TRISTA PEACE R                              724.5   

                          BACK PAIN, GENERAL                       

 

                2012              TRISTA PEACE R                              788.1   

                          pain during urination (dysuria)                       

 

                2012              ANDREW ROACH                              724.5        

                          BACK PAIN, GENERAL                       

 

                2012              ANDREW ROACH                              788.1        

                          pain during urination (dysuria)                       

 

             2012              MOSER DO, ESTIVEN K                             724.5              

BACK PAIN, GENERAL                               

 

             2012              MOSER DO, ESTIVEN K                             788.1              

pain during urination (dysuria)                       

 

             2012              MOSER DO, ESTIVEN K                             724.5              

BACK PAIN, GENERAL                               

 

             2012              MOSER DO, ESTIVEN K                             788.1              

pain during urination (dysuria)                       

 

                2012              YAS DIAZ PSYD                              724.5    

                          BACK PAIN, GENERAL                       

 

                2012              YAS DIAZ PSYD                              788.1    

                          DYSURIA                            

 

           2013                             Ot              276.51                             

         

 

           2013                             Ot              599.0                              

        

 

           2013                             Ot              780.4                              

        

 

                2013              TRISTA PEACE R                              789.09  

                          flank pain right                       

 

                2013              GAA JOHNSON MD                              789.09          

                          flank pain right                       

 

             2013                                            789.09              flank pain right

                                                 

 

             2013                                            789.09              flank pain right

                                                 

 

             2013                                            789.09              flank pain right

                                                 

 

                2013              TRISTA PEACE R                              789.09  

                          flank pain right                       

 

                2013              ANDREW ROACH                              789.09       

                          FLANK PAIN RIGHT                       

 

                2013              ESTIVEN MOSER DO                              789.09           

                          FLANK PAIN RIGHT                       

 

                2013              ESTIVEN MOSER DO K                              789.09           

                          FLANK PAIN RIGHT                       

 

                2013              AGA JOHNSON MD                              278.00          

                          OBESITY                            

 

                2013              AGA JOHNSON MD                              724.2           

                          BACK PAIN, LOWER                       

 

                2013              AGA JOHNSON MD                              789.05          

                          ABDOMINAL PAIN PERIUMBILIC                       

 

             2013                                            278.00              OBESITY       

                                                 

 

             2013                                            724.2              BACK PAIN, LOWER

                                                 

 

             2013                                            789.05              ABDOMINAL PAIN

 PERIUMBILIC                                     

 

             2013                                            278.00              OBESITY       

                                                 

 

             2013                                            724.2              BACK PAIN, LOWER

                                                 

 

             2013                                            789.05              ABDOMINAL PAIN

 PERIUMBILIC                                     

 

             2013                                            278.00              OBESITY       

                                                 

 

             2013                                            724.2              BACK PAIN, LOWER

                                                 

 

             2013                                            789.05              ABDOMINAL PAIN

 PERIUMBILIC                                     

 

                2013              TRISTA PEACE R                              278.00  

                          OBESITY                            

 

                2013              TRISTA PEACE R                              724.2   

                          BACK PAIN, LOWER                       

 

                2013              TRISTA PEACE R                              789.05  

                          ABDOMINAL PAIN PERIUMBILIC                       

 

                2013              ANDREW ROACH                              278.00       

                          OBESITY                            

 

                2013              ANDREW ROACH                              724.2        

                          BACK PAIN, LOWER                       

 

                2013              ANDREW ROACH                              789.05       

                          ABDOMINAL PAIN PERIUMBILIC                       

 

                2013              MARIAH MOSER DOA K                              278.00           

                          OBESITY                            

 

             2013              ESTIVEN MOSER DO                             724.2              

BACK PAIN, LOWER                                 

 

                2013              MOSER DO, ESTIVEN K                              789.05           

                          ABDOMINAL PAIN PERIUMBILIC                       

 

                2013              MOSER DO, ESTIVEN K                              278.00           

                          OBESITY                            

 

             2013              MOSER DO, ESTIVEN K                             724.2              

BACK PAIN, LOWER                                 

 

                2013              MOSER DO, ESTIVEN K                              789.05           

                          ABDOMINAL PAIN PERIUMBILIC                       

 

             2013                                            626.4              IRREGULAR MENSTRUAL

 CYCLE                                           

 

             2013                                            783.5              POLYDIPSIA     

                                                 

 

             2013                                            788.41              URINARY FREQUENCY

                                                 

 

             2013                                            626.4              IRREGULAR MENSTRUAL

 CYCLE                                           

 

             2013                                            783.5              POLYDIPSIA     

                                                 

 

             2013                                            788.41              URINARY FREQUENCY

                                                 

 

             2013                                            626.4              IRREGULAR MENSTRUAL

 CYCLE                                           

 

             2013                                            783.5              POLYDIPSIA     

                                                 

 

             2013                                            788.41              URINARY FREQUENCY

                                                 

 

                2013              TRISTA PEACE R                              626.4   

                          IRREGULAR MENSTRUAL CYCLE                       

 

                2013              TRISTA PEACE R                              783.5   

                          POLYDIPSIA                         

 

                2013              TRISTA PEACE R                              788.41  

                          URINARY FREQUENCY                       

 

                2013              ANDREW ROACH                              626.4        

                          IRREGULAR MENSTRUAL CYCLE                       

 

                2013              ANDREW ROACH                              783.5        

                          POLYDIPSIA                         

 

                2013              ANDREW ROACH                              788.41       

                          URINARY FREQUENCY                       

 

             2013              MOSER DO, ESTIVEN K                             626.4              

IRREGULAR MENSTRUAL CYCLE                        

 

             2013              MOSER DO, ESTIVEN K                             783.5              

POLYDIPSIA                                       

 

                2013              MOSER DO, ESTIVEN K                              788.41           

                          URINARY FREQUENCY                       

 

             2013              MOSER DO, ESTIVEN K                             626.4              

IRREGULAR MENSTRUAL CYCLE                        

 

             2013              MOSER DO, ESTIVEN K                             783.5              

POLYDIPSIA                                       

 

                2013              MOSER DO, ESTIVEN K                              788.41           

                          URINARY FREQUENCY                       

 

             2013                                            569.42              ANAL OR RECTAL

 PAIN                                            

 

             2013                                            625.9              PELVIC PAIN    

                                                 

 

             2013                                            569.42              ANAL OR RECTAL

 PAIN                                            

 

             2013                                            625.9              PELVIC PAIN    

                                                 

 

                2013              TRISTA PEACE R                              569.42  

                          ANAL OR RECTAL PAIN                       

 

                2013              TRISTA PEACE R                              625.9   

                          PELVIC PAIN                        

 

                2013              ANDREW ROACH                              569.42       

                          ANAL OR RECTAL PAIN                       

 

                2013              ANDREW ROACH                              625.9        

                          PELVIC PAIN                        

 

                2013              MOSER DO ESTIVEN K                              569.42           

                          ANAL OR RECTAL PAIN                       

 

             2013              MOSER DO, ESTIVEN K                             625.9              

PELVIC PAIN                                      

 

                2013              MOSER DO, ESTIVEN K                              569.42           

                          ANAL OR RECTAL PAIN                       

 

             2013              MOSER DO, ESTIVEN K                             625.9              

PELVIC PAIN                                      

 

             2013                                            786.2              COUGH          

                                                 

 

                2013              TRITSA PEACE R                              786.2   

                          COUGH                              

 

                2013              ANDREW ROAHC                              786.2        

                          COUGH                              

 

             2013              MOSER DO, ESTIVEN K                             786.2              

COUGH                                            

 

             2013              MOSER DO, ESTIVEN K                             786.2              

COUGH                                            

 

                10/10/2013              TRISTA PEACE R                              599.0   

                          URINARY TRACT INFECTION                       

 

                10/10/2013              ANDREW ROACH                              599.0        

                          URINARY TRACT INFECTION                       

 

             10/10/2013              MOSER DO ESTIVEN K                             599.0              

URINARY TRACT INFECTION                          

 

             10/10/2013              MOSER DO, ESTIVEN K                             599.0              

URINARY TRACT INFECTION                          

 

             2013              MOSER DO ESTIVEN K                             782.1              

RASH AND OTHER NONSPECIFIC SKIN ERUPTION                       

 

             2013              MOSER DO ESTIVEN K                             782.1              

RASH AND OTHER NONSPECIFIC SKIN ERUPTION                       

 

                2013              MOSER DO ESTIVEN K                              V72.42           

                          PREGNANCY TEST POSITIVE RESULT                       

 

             2013              JACKELIN BYRD DO              Ot              623.8              

                                                 

 

                2013              JACKELIN BYRD DO              Ot              640.03           

                                                             

 

             2013              JACKELIN BYRD DO              Ot              696.3              

                                                 

 

                2014              CARLINE VILLA DO              Ot              338.29      

                          OTHER CHRONIC PAIN                       

 

                2014              CARLINE VILLA DO              Ot              493.90      

                          ASTHMA, UNSPECIFIED                       

 

                2014              CARLINE VILLA DO              Ot              642.33      

                          TRANS HYPERTEN-ANTEPART                       

 

                2014              CARLINE VILLA DO              Ot              648.93      

                          OTH CURR COND-ANTEPARTUM                       

 

                2014              CARLINE VILLA DO              Ot              784.0       

                          HEADACHE                           

 

                2014              CARLINE VILLA DO              Ot              V12.54      

                          PERSONAL HX OF TIA,  CEREBRAL INFARCTION                       

 

                2014              CARLINE VILLA DO              Ot              525.9       

                                                             

 

                2014              CARLINE VILLA DO              Ot              648.93      

                                                             

 

                2014              FENECH DOCARLINE              Ot              784.0       

                                                             

 

                2014              FENECH DO, CARLINE AHUJA              Ot              787.02      

                                                             

 

                2014              FENECH DO, CARLINE AHUJA              Ot              599.0       

                                                             

 

                2014              FENECH DO, CARLINE AHUJA              Ot              646.63      

                                                             

 

                2014              FENECH DO, CARLINE AHUJA              Ot              V22.1       

                                                             

 

                2014              FENECH DOCARLINE              Ot              644.03      

                                                             

 

                2014              FENECH DO, CARLINE AHUJA              Ot              654.23      

                                                             

 

                2014              FENECH DO, CARLINE AHUJA              Ot              285.1       

                                                             

 

                2014              FENECH DO, CARLINE AHUJA              Ot              648.22      

                                                             

 

                2014              FENECH DO, CARLINE AHUJA              Ot              654.21      

                                                             

 

                2014              BHAVANIECH CARLINE MALONE              Ot              V27.0       

                                                             

 

                2016              AZIZA SNELL              Ot              N83.20     

                          UNSPECIFIED OVARIAN CYSTS                       

 

                2016              AZIZA SNELL Ot              Z32.02     

                          ENCOUNTER FOR PREGNANCY TEST, RESULT NEG                       

 

                2016              AZIZA SNELL              Ot              N39.0      

                          URINARY TRACT INFECTION, SITE NOT SPECIF                       

 

                2016              AZIZA SNELL              Ot              O20.0      

                          THREATENED                        

 

                2016              AZIZA SNELL              Ot              Z3A.01     

                          LESS THAN 8 WEEKS GESTATION OF PREGNANCY                       

 

                2016              AZIZA SNELL Ot              Z87.891    

                          PERSONAL HISTORY OF NICOTINE DEPENDENCE                       

 

                2016              DAISY MALONE CARLINE AHUJA              Ot              N13.1       

                          HYDRONEPHROSIS W URETERAL STRICTURE, NEC                       

 

                2016              DAISY MLAONE CARLINE S              Ot              O43.192     

                          OTHER MALFORMATION OF PLACENTA, SECOND T                       

 

                2016              DAISY MALONE CARLINE HAUJA              Ot              O99.89      

                          OTH DISEASES AND CONDITIONS COMPL PREG/C                       

 

                2016              DAISY MALONE CARLINE AHUJA              Ot              R09.89      

                          OTH SYMPTOMS AND SIGNS INVOLVING THE CIR                       

 

                2016              DAISY MALONE CARLINE AHUJA              Ot              O34.21      

                          MATERNAL CARE FOR SCAR FROM PREVIOUS ROXANA                       

 

                2016              DAISY MALONE CARLINE S              Ot              O43.192     

                          OTHER MALFORMATION OF PLACENTA, SECOND T                       

 

                2016              DAISY MALONE CARLINE AHUJA              Ot              R09.89      

                          OTH SYMPTOMS AND SIGNS INVOLVING THE CIR                       

 

                2016              DAISY MALONE CARLINE AHUJA              Ot              N13.1       

                          HYDRONEPHROSIS W URETERAL STRICTURE, NEC                       

 

                2016              FENECH DO, CARLINE S              Ot              O43.192     

                          OTHER MALFORMATION OF PLACENTA, SECOND T                       

 

                2016              FENECH DO, CARLINE S              Ot              O99.89      

                          OTH DISEASES AND CONDITIONS COMPL PREG/C                       

 

                2016              FENECH DO, CARLINE S              Ot              R09.89      

                          OTH SYMPTOMS AND SIGNS INVOLVING THE CIR                       

 

                2016              FENECH DO, CARLINE S              Ot              N13.1       

                          HYDRONEPHROSIS W URETERAL STRICTURE, NEC                       

 

                2016              FENECH DO, CARLINE S              Ot              O43.192     

                          OTHER MALFORMATION OF PLACENTA, SECOND T                       

 

                2016              FENECH DO, CARLINE S              Ot              O99.89      

                          OTH DISEASES AND CONDITIONS COMPL PREG/C                       

 

                2016              FENECH DO, CARLINE S              Ot              R09.89      

                          OTH SYMPTOMS AND SIGNS INVOLVING THE CIR                       

 

                2016              FENECH DO, CARLINE S              Ot              O34.21      

                          MATERNAL CARE FOR SCAR FROM PREVIOUS ROXANA                       

 

                2016              FENECH DO, CARLINE S              Ot              O43.192     

                          OTHER MALFORMATION OF PLACENTA, SECOND T                       

 

                2016              FENECH DO, CARLINE S              Ot              R09.89      

                          OTH SYMPTOMS AND SIGNS INVOLVING THE CIR                       

 

                10/05/2016              FENECH DO, CARLINE S              Ot              N13.1       

                          HYDRONEPHROSIS W URETERAL STRICTURE, NEC                       

 

                10/05/2016              FENECH DO, CARLINE S              Ot              O43.192     

                          OTHER MALFORMATION OF PLACENTA, SECOND T                       

 

                10/05/2016              FENECH DO, CARLINE S              Ot              O99.89      

                          OTH DISEASES AND CONDITIONS COMPL PREG/C                       

 

                10/05/2016              FENECH DO, CARLINE S              Ot              R09.89      

                          OTH SYMPTOMS AND SIGNS INVOLVING THE CIR                       

 

                10/05/2016              FENECH DO, CARLINE S              Ot              O34.21      

                          MATERNAL CARE FOR SCAR FROM PREVIOUS ROXANA                       

 

                10/05/2016              FENECH DO, CARLINE S              Ot              O43.192     

                          OTHER MALFORMATION OF PLACENTA, SECOND T                       

 

                10/05/2016              FENECH DO, CARLINE S              Ot              R09.89      

                          OTH SYMPTOMS AND SIGNS INVOLVING THE CIR                       

 

                10/05/2016              ANNA ANDERSON MD              Ot              M54.5         

                          LOW BACK PAIN                       

 

                10/05/2016              ANNA ANDERSON MD              Ot              O99.89        

                          OTH DISEASES AND CONDITIONS COMPL PREG/C                       

 

                10/05/2016              ANNA ANDERSON MD              Ot              Z3A.32        

                          32 WEEKS GESTATION OF PREGNANCY                       

 

                10/10/2016              ANNA ANDERSON MD              Ot              M54.5         

                          LOW BACK PAIN                       

 

                10/10/2016              ANNA ANDERSON MD              Ot              O99.89        

                          OTH DISEASES AND CONDITIONS COMPL PREG/C                       

 

                10/10/2016              ANNA ANDERSON MD              Ot              Z3A.32        

                          32 WEEKS GESTATION OF PREGNANCY                       

 

                10/10/2016              FENECH DO, CARLINE S              Ot              N13.1       

                          HYDRONEPHROSIS W URETERAL STRICTURE, NEC                       

 

                10/10/2016              FENECH DO, CARLINE S              Ot              O43.192     

                          OTHER MALFORMATION OF PLACENTA, SECOND T                       

 

                10/10/2016              FENECH DO, CARLINE S              Ot              O99.89      

                          OTH DISEASES AND CONDITIONS COMPL PREG/C                       

 

                10/10/2016              FENECH DO, CARLINE S              Ot              R09.89      

                          OTH SYMPTOMS AND SIGNS INVOLVING THE CIR                       

 

                10/10/2016              FENECH DO, CARLINE S              Ot              O34.21      

                          MATERNAL CARE FOR SCAR FROM PREVIOUS ROXANA                       

 

                10/10/2016              FENECH DO, CARLINE S              Ot              O43.192     

                          OTHER MALFORMATION OF PLACENTA, SECOND T                       

 

                10/10/2016              FENECH DO, CARLINE S              Ot              R09.89      

                          OTH SYMPTOMS AND SIGNS INVOLVING THE CIR                       

 

                10/12/2016              ANNA ANDERSON MD, Ot              M54.5         

                          LOW BACK PAIN                       

 

                10/12/2016              ANNA ANDERSON MD              Ot              O99.89        

                          OTH DISEASES AND CONDITIONS COMPL PREG/C                       

 

                10/12/2016              ANNA ANDERSON MD              Ot              Z3A.32        

                          32 WEEKS GESTATION OF PREGNANCY                       

 

                10/24/2016              BETSY TOVAR MD, Ot              O47.9  

                          FALSE LABOR, UNSPECIFIED                       

 

                10/24/2016              BETSY TOVAR MD, Ot.35 

                          35 WEEKS GESTATION OF PREGNANCY                       

 

                10/27/2016              BETSY TOVAR MD, Ot              O47.9  

                          FALSE LABOR, UNSPECIFIED                       

 

                10/27/2016              BETSY TOVAR MD, Ot.00 

                          WEEKS OF GESTATION OF PREGNANCY NOT SPEC                       

 

                10/27/2016              BETSY TOVAR MD, Ot              O47.9  

                          FALSE LABOR, UNSPECIFIED                       

 

                10/27/2016              BETSY TOVAR MD, Ot.35 

                          35 WEEKS GESTATION OF PREGNANCY                       

 

                2016              FENECH DO CARLINE S              Ot              O26.893     

                          OTH PREGNANCY RELATED CONDITIONS, THIRD                        

 

                2016              FENECH DO, CARLINE S              Ot              O34.211     

                          MATERN CARE FOR LOW TRANSVERSE SCAR FROM                       

 

                2016              FENECH DOCARLINE              Ot              R10.2       

                          PELVIC AND PERINEAL PAIN                       

 

                2016              BHAVANIECH DOCARLINE              Ot              Z3A.37      

                          37 WEEKS GESTATION OF PREGNANCY                       

 

                2017              DAISY DOCARLINE S              Ot              N13.1       

                          HYDRONEPHROSIS W URETERAL STRICTURE, NEC                       

 

                2017              FENECH DO, CARLINE S              Ot              O43.192     

                          OTHER MALFORMATION OF PLACENTA, SECOND T                       

 

                2017              BHAVANIECH DO, CARLINE S              Ot              O99.89      

                          OTH DISEASES AND CONDITIONS COMPL PREG/C                       

 

                2017              FENECH DO, CARLINE S              Ot              R09.89      

                          OTH SYMPTOMS AND SIGNS INVOLVING THE CIR                       

 

                2017              FENECH DO, CARLINE S              Ot              O34.21      

                          MATERNAL CARE FOR SCAR FROM PREVIOUS ROXANA                       

 

                2017              BHAVANIECH DOCARLINE S              Ot              O43.192     

                          OTHER MALFORMATION OF PLACENTA, SECOND T                       

 

                2017              BHAVANIECH DOCARLINE S              Ot              R09.89      

                          OTH SYMPTOMS AND SIGNS INVOLVING THE CIR                       

 

                2017              BHAVANIECH DOCARLINE              Ot              N80.9       

                          ENDOMETRIOSIS, UNSPECIFIED                       

 

                2017              FENECH DOCARLINE S              Ot              Z01.812     

                          ENCOUNTER FOR PREPROCEDURAL LABORATORY E                       

 

                2017              BHAVANIECH DO, CARLINE S              Ot              N80.9       

                          ENDOMETRIOSIS, UNSPECIFIED                       

 

                2017              FENECH DO, CARLINE S              Ot              Z01.812     

                          ENCOUNTER FOR PREPROCEDURAL LABORATORY E                       

 

                2017              DAISY DO, CARLINE AHUJA              Ot              N13.1       

                          HYDRONEPHROSIS W URETERAL STRICTURE, NEC                       

 

                2017              BHAVANIECH DOCARLINE S              Ot              O43.192     

                          OTHER MALFORMATION OF PLACENTA, SECOND T                       

 

                2017              BHAVANIECH DOCARLINE S              Ot              O99.89      

                          OTH DISEASES AND CONDITIONS COMPL PREG/C                       

 

                2017              BHAVANIECH DO, CARLINE S              Ot              R09.89      

                          OTH SYMPTOMS AND SIGNS INVOLVING THE CIR                       

 

                2017              FENECH DOCARLINE S              Ot              O34.21      

                          MATERNAL CARE FOR SCAR FROM PREVIOUS ROXANA                       

 

                2017              BHAVANIECH DOCARLINE S              Ot              O43.192     

                          OTHER MALFORMATION OF PLACENTA, SECOND T                       

 

                2017              FENECH DOCARLINE S              Ot              R09.89      

                          OTH SYMPTOMS AND SIGNS INVOLVING THE CIR                       

 

                2017              FENECH DOCARLINE S              Ot              N73.6       

                          FEMALE PELVIC PERITONEAL ADHESIONS (POST                       

 

                2017              FENECH DOCARLINE S              Ot              N94.5       

                          SECONDARY DYSMENORRHEA                       

 

                2017              DAISY CARLINE MALONE S              Ot              R10.2       

                          PELVIC AND PERINEAL PAIN                       

 

                2017              CARLINE VILLA DO              Ot              R33.9       

                          RETENTION OF URINE, UNSPECIFIED                       

 

                2017              CARLINE VILLA DO              Ot              Z87.42      

                          PERSONAL HISTORY OF OTH DISEASES OF THE                        

 

                2017              JACOB MALONE ADONAY              Ot              F17.210         

                          NICOTINE DEPENDENCE, CIGARETTES, UNCOMPL                       

 

                2017              JACOB DO ADONAY              Ot              K04.7           

                          PERIAPICAL ABSCESS WITHOUT SINUS                       

 

                2017              JAMES DO, ADONAY              Ot              K59.09          

                          OTHER CONSTIPATION                       

 

                2017              JAMES DO, ADONAY              Ot              L03.211         

                          CELLULITIS OF FACE                       

 

                2017              JAMES DO ADNOAY              Ot              R79.89          

                          OTHER SPECIFIED ABNORMAL FINDINGS OF BLO                       

 

                2017              JACOB DO ADONAY              Ot              F17.210         

                          NICOTINE DEPENDENCE, CIGARETTES, UNCOMPL                       

 

                2017              JACOB DO ADNOAY              Ot              K04.7           

                          PERIAPICAL ABSCESS WITHOUT SINUS                       

 

                2017              JAMES DO ADONAY              Ot              K59.09          

                          OTHER CONSTIPATION                       

 

                2017              JAMES DO, ADONAY              Ot              L03.211         

                          CELLULITIS OF FACE                       

 

                2017              JAMES DO ADONAY              Ot              R79.89          

                          OTHER SPECIFIED ABNORMAL FINDINGS OF BLO                       

 

                2017              BHAVANIJOSE CARLINE MALONE S              Ot              N73.6       

                          FEMALE PELVIC PERITONEAL ADHESIONS (POST                       

 

                2017              BHAVANICARLINE GARCIA DO S              Ot              N94.5       

                          SECONDARY DYSMENORRHEA                       

 

                2017              CARLINE VILLA DO S              Ot              R10.2       

                          PELVIC AND PERINEAL PAIN                       

 

                2017              CARLINE VILLA DO              Ot              R33.9       

                          RETENTION OF URINE, UNSPECIFIED                       

 

                2017              CARLINE VILLA DO              Ot              Z87.42      

                          PERSONAL HISTORY OF OTH DISEASES OF THE                        

 

                2017              CARLINE VILLA DO S              Ot              N73.6       

                          FEMALE PELVIC PERITONEAL ADHESIONS (POST                       

 

                2017              CARLINE VILLA DO S              Ot              N94.5       

                          SECONDARY DYSMENORRHEA                       

 

                2017              CARLINE VILLA DO S              Ot              R10.2       

                          PELVIC AND PERINEAL PAIN                       

 

                2017              CARLINE VILLA DO S              Ot              R33.9       

                          RETENTION OF URINE, UNSPECIFIED                       

 

                2017              CARLINE VILLA DO              Ot              Z87.42      

                          PERSONAL HISTORY OF OTH DISEASES OF THE                        

 

                2017              CARLINE VILLA DO S              Ot              N13.1       

                          HYDRONEPHROSIS W URETERAL STRICTURE, NEC                       

 

                2017              BHAVANIECH DO, CARLINE S              Ot              O43.192     

                          OTHER MALFORMATION OF PLACENTA, SECOND T                       

 

                2017              DAISY DO, CARLINE S              Ot              O99.89      

                          OTH DISEASES AND CONDITIONS COMPL PREG/C                       

 

                2017              DAISY DO CARLINE S              Ot              R09.89      

                          OTH SYMPTOMS AND SIGNS INVOLVING THE CIR                       

 

                2017              DAISY DO CARLINE S              Ot              O34.21      

                          MATERNAL CARE FOR SCAR FROM PREVIOUS ROXANA                       

 

                2017              DAISY DO, CARLINE S              Ot              O43.192     

                          OTHER MALFORMATION OF PLACENTA, SECOND T                       

 

                2017              DAISY DO CARLINE S              Ot              R09.89      

                          OTH SYMPTOMS AND SIGNS INVOLVING THE CIR                       

 

                2017              JOLANTA JAMES DOI              Ot              F17.210         

                          NICOTINE DEPENDENCE, CIGARETTES, UNCOMPL                       

 

                2017              JOLANTA JAMES DOI              Ot              K04.7           

                          PERIAPICAL ABSCESS WITHOUT SINUS                       

 

                2017              JOLANTA JAMES DOI              Ot              K59.09          

                          OTHER CONSTIPATION                       

 

                2017              JACOB MALONE ADONAY              Ot              L03.211         

                          CELLULITIS OF FACE                       

 

                2017              JACOB MALONE ADONAY              Ot              R79.89          

                          OTHER SPECIFIED ABNORMAL FINDINGS OF BLO                       

 

                06/15/2017              DAISY MALONE CARLINE S              Ot              N13.1       

                          HYDRONEPHROSIS W URETERAL STRICTURE, NEC                       

 

                06/15/2017              DAISY DO, CARLINE S              Ot              O43.192     

                          OTHER MALFORMATION OF PLACENTA, SECOND T                       

 

                06/15/2017              DAISY DO CARLINE S              Ot              O99.89      

                          OTH DISEASES AND CONDITIONS COMPL PREG/C                       

 

                06/15/2017              DAISY DO CARLINE S              Ot              R09.89      

                          OTH SYMPTOMS AND SIGNS INVOLVING THE CIR                       

 

                06/15/2017              DAISY DO CARLINE S              Ot              O34.21      

                          MATERNAL CARE FOR SCAR FROM PREVIOUS ROXANA                       

 

                06/15/2017              DAISY DO, CARLINE S              Ot              O43.192     

                          OTHER MALFORMATION OF PLACENTA, SECOND T                       

 

                06/15/2017              DAISY DOCARLINE S              Ot              R09.89      

                          OTH SYMPTOMS AND SIGNS INVOLVING THE CIR                       

 

                2017              EDELMIRA CHENG MD              Ot              M41.84      

                          OTHER FORMS OF SCOLIOSIS, THORACIC REGIO                       

 

                2017              EDELMIRA CHENG MD              Ot              M54.2       

                          CERVICALGIA                        

 

                2017              EDELMIRA CHENG MD              Ot              M54.5       

                          LOW BACK PAIN                       

 

                2017              EDELMIRA CHENG MD              Ot              M54.6       

                          PAIN IN THORACIC SPINE                       

 

                2017              EDELMIRA CHENG MD              Ot              M41.84      

                          OTHER FORMS OF SCOLIOSIS, THORACIC REGIO                       

 

                2017              EDELMIRA CHENG MD              Ot              M54.2       

                          CERVICALGIA                        

 

                2017              EDELMIRA CHENG MD              Ot              M54.5       

                          LOW BACK PAIN                       

 

                2017              EDELMIRA CHENG MD              Ot              M54.6       

                          PAIN IN THORACIC SPINE                       

 

                2017              FENECH DO, CARLINE S              Ot              N13.1       

                          HYDRONEPHROSIS W URETERAL STRICTURE, NEC                       

 

                2017              FENECH DO, CARLINE S              Ot              O43.192     

                          OTHER MALFORMATION OF PLACENTA, SECOND T                       

 

                2017              FENECH DO, CARLINE S              Ot              O99.89      

                          OTH DISEASES AND CONDITIONS COMPL PREG/C                       

 

                2017              FENECH DO, CARLINE S              Ot              R09.89      

                          OTH SYMPTOMS AND SIGNS INVOLVING THE CIR                       

 

                2017              FENECH DO, CARLINE S              Ot              O34.21      

                          MATERNAL CARE FOR SCAR FROM PREVIOUS ROXANA                       

 

                2017              FENECH DO, CARLINE S              Ot              O43.192     

                          OTHER MALFORMATION OF PLACENTA, SECOND T                       

 

                2017              FENECH DO, CARLINE S              Ot              R09.89      

                          OTH SYMPTOMS AND SIGNS INVOLVING THE CIR                       

 

                2017              EDELMIRA CHENG MD              Ot              M41.84      

                          OTHER FORMS OF SCOLIOSIS, THORACIC REGIO                       

 

                2017              EDELMIRA CHENG MD              Ot              M54.2       

                          CERVICALGIA                        

 

                2017              EDELMIRA CHENG MD              Ot              M54.5       

                          LOW BACK PAIN                       

 

                2017              EDELMIRA CHENG MD              Ot              M54.6       

                          PAIN IN THORACIC SPINE                       

 

                2017              EDELMIRA CHENG MD              Ot              M54.5       

                          LOW BACK PAIN                       

 

                2017              EDELMIRA CHENG MD              Ot              R93.7       

                          ABNORMAL FINDINGS ON DIAGNOSTIC IMAGING                        

 

                2017              EDELMIRA CHENG MD              Ot              M54.5       

                          LOW BACK PAIN                       

 

                2017              EDELMIRA CHENG MD              Ot              R93.7       

                          ABNORMAL FINDINGS ON DIAGNOSTIC IMAGING                        

 

             2018              EDELMIRA CHENG              780.79              

OTHER MALAISE AND FATIGUE                        

 

             2018              EDELMIRA CHENG              787.02              

NAUSEA ALONE                                     

 

             2018              EDELMIRA CHENG              P96.89              

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        

 

                                        Automated urine sediment leukocyte count by microscopy (number/high power field)

                           [HPF]                    NRG        

 

                          Bacteria detection in urine sediment by light microscopy              TRACE       

                                        NRG        

 

                                        Squamous epithelial cells detection in urine sediment by light microscopy       

                          10-                     NRG        

 

                          Crystals detection in urine sediment by light microscopy              NONE        

                                        NRG        

 

                          Casts detection in urine sediment by light microscopy              NONE           

                                        NRG        

 

                          Mucus detection in urine sediment by light microscopy              NEGATIVE       

                                        NRG        

 

                    Complete urinalysis with reflex to culture              YES                 NRG       

 









                                        Bacterial urine culture - 10/05/16 19:40         









                    Bacterial urine culture              [id number]               NRG        

 

                    COLONY COUNT              >100,000/ML               NRG        

 

                    FTX;REPORTABLE              PLUS MIXED GRAM POSITIVES               NRG        

 

                    FREE TEXT ENTRY 2              <10,000/ML               NRG        









                                        Complete urinalysis with reflex to culture - 10/24/16 16:00         









                    Urine color determination              YELLOW               NRG        

 

                    Urine clarity determination              SLIGHTLY CLOUDY               NRG        

 

                    Urine pH measurement by test strip              6                   5-9        

 

                    Specific gravity of urine by test strip              1.025               1.016-1.022

        

 

                    Urine protein assay by test strip, semi-quantitative              2+                  NEGATIVE

        

 

                    Urine glucose detection by automated test strip              NEGATIVE               

NEGATIVE        

 

                          Erythrocytes detection in urine sediment by light microscopy              NEGATIVE

                                        NEGATIVE        

 

                    Urine ketones detection by automated test strip              4+                  NEGATIVE

        

 

                    Urine nitrite detection by test strip              NEGATIVE               NEGATIVE  

      

 

                    Urine total bilirubin detection by test strip              NEGATIVE               NEGATIVE

        

 

                          Urine urobilinogen measurement by automated test strip (mass/volume)              

4 mg/dL                                 NORMAL        

 

                    Urine leukocyte esterase detection by dipstick              2+                  NEGATIVE

        

 

                                        Automated urine sediment erythrocyte count by microscopy (number/high power field)

                          NONE                      NRG        

 

                                        Automated urine sediment leukocyte count by microscopy (number/high power field)

                           [HPF]                    NRG        

 

                          Bacteria detection in urine sediment by light microscopy              LARGE       

                                        NRG        

 

                                        Squamous epithelial cells detection in urine sediment by light microscopy       

                          25-50                     NRG        

 

                          Crystals detection in urine sediment by light microscopy              NONE        

                                        NRG        

 

                          Casts detection in urine sediment by light microscopy              NONE           

                                        NRG        

 

                          Mucus detection in urine sediment by light microscopy              LARGE          

                                        NRG        

 

                    Complete urinalysis with reflex to culture              YES                 NRG       

 









                                        Urine drug screening test - 10/24/16 16:00         









                          Urine phencyclidine detection by screening method              NEGATIVE           

                                        NEGATIVE        

 

                          Urine benzodiazepines detection by screening method              NEGATIVE         

                                        NEGATIVE        

 

                    Urine cocaine detection              NEGATIVE               NEGATIVE        

 

                          Urine amphetamines detection by screening method              NEGATIVE            

                                        NEGATIVE        

 

                          Urine methamphetamine detection by screening method              NEGATIVE         

                                        NEGATIVE        

 

                          Urine cannabinoids detection by screening method              NEGATIVE            

                                        NEGATIVE        

 

                    Urine opiates detection by screening method              NEGATIVE               NEGATIVE

        

 

                    Urine barbiturates detection              POSITIVE               NEGATIVE        

 

                          Screening urine tricyclic antidepressants detection              NEGATIVE         

                                        NEGATIVE        

 

                    Urine methadone detection by screening method              NEGATIVE               NEGATIVE

        

 

                    Urine oxycodone detection              NEGATIVE               NEGATIVE        

 

                    Urine propoxyphene detection              NEGATIVE               NEGATIVE        

 

                    Urine buprenophrine screen              NEGATIVE               NEGATIVE        









                                        Bacterial urine culture - 10/24/16 16:00         









                    URINE CULTURE RESULTS              10,000/ML - 100,000/ML               NRG        









                                        Complete blood count (CBC) with automated white blood cell (WBC) differential - 

16 15:55         









                          Blood leukocytes automated count (number/volume)              11.7 10*3/uL        

                                        4.3-11.0        

 

                          Blood erythrocytes automated count (number/volume)              3.87 10*6/uL      

                                        4.35-5.85        

 

                          Venous blood hemoglobin measurement (mass/volume)              11.2 g/dL          

                                        11.5-16.0        

 

                    Blood hematocrit (volume fraction)              33 %                35-52        

 

                    Automated erythrocyte mean corpuscular volume              85 [foz_us]              

80-99        

 

                                        Automated erythrocyte mean corpuscular hemoglobin (mass per erythrocyte)        

                          29 pg                     25-34        

 

                                        Automated erythrocyte mean corpuscular hemoglobin concentration measurement (mass/volume)

                          34 g/dL                   32-36        

 

                    Automated erythrocyte distribution width ratio              12.3 %              10.0-

14.5        

 

                    Automated blood platelet count (count/volume)              314 10*3/uL              

130-400        

 

                          Automated blood platelet mean volume measurement              9.9 [foz_us]        

                                        7.4-10.4        

 

                    Automated blood neutrophils/100 leukocytes              74 %                42-75     

   

 

                    Automated blood lymphocytes/100 leukocytes              18 %                12-44     

   

 

                    Blood monocytes/100 leukocytes              7 %                 0-12        

 

                    Automated blood eosinophils/100 leukocytes              1 %                 0-10       

 

 

                    Automated blood basophils/100 leukocytes              0 %                 0-10        

 

                          Blood neutrophils automated count (number/volume)              8.7 10*3           

                                        1.8-7.8        

 

                          Blood lymphocytes automated count (number/volume)              2.2 10*3           

                                        1.0-4.0        

 

                    Blood monocytes automated count (number/volume)              0.8 10*3              0.0-

1.0        

 

                    Automated eosinophil count              0.1 10*3/uL              0.0-0.3        

 

                    Automated blood basophil count (count/volume)              0.0 10*3/uL              

0.0-0.1        









                                        Blood type T Indirect antibody screen panel - 16 15:55         









                    ABO+Rh group              AN                  NRG        

 

                    Transfusion band number              [id number]               NRG        

 

                    Blood group antibody screen              NEGATIVE               NRG        









                                        Complete blood count (CBC) with automated white blood cell (WBC) differential - 

16 05:45         









                          Blood leukocytes automated count (number/volume)              12.6 10*3/uL        

                                        4.3-11.0        

 

                          Blood erythrocytes automated count (number/volume)              3.55 10*6/uL      

                                        4.35-5.85        

 

                          Venous blood hemoglobin measurement (mass/volume)              10.2 g/dL          

                                        11.5-16.0        

 

                    Blood hematocrit (volume fraction)              30 %                35-52        

 

                    Automated erythrocyte mean corpuscular volume              85 [foz_us]              

80-99        

 

                                        Automated erythrocyte mean corpuscular hemoglobin (mass per erythrocyte)        

                          29 pg                     25-34        

 

                                        Automated erythrocyte mean corpuscular hemoglobin concentration measurement (mass/volume)

                          34 g/dL                   32-36        

 

                    Automated erythrocyte distribution width ratio              12.4 %              10.0-

14.5        

 

                    Automated blood platelet count (count/volume)              276 10*3/uL              

130-400        

 

                          Automated blood platelet mean volume measurement              9.9 [foz_us]        

                                        7.4-10.4        

 

                    Automated blood neutrophils/100 leukocytes              76 %                42-75     

   

 

                    Automated blood lymphocytes/100 leukocytes              17 %                12-44     

   

 

                    Blood monocytes/100 leukocytes              7 %                 0-12        

 

                    Automated blood eosinophils/100 leukocytes              1 %                 0-10       

 

 

                    Automated blood basophils/100 leukocytes              0 %                 0-10        

 

                          Blood neutrophils automated count (number/volume)              9.5 10*3           

                                        1.8-7.8        

 

                          Blood lymphocytes automated count (number/volume)              2.1 10*3           

                                        1.0-4.0        

 

                    Blood monocytes automated count (number/volume)              0.9 10*3              0.0-

1.0        

 

                    Automated eosinophil count              0.1 10*3/uL              0.0-0.3        

 

                    Automated blood basophil count (count/volume)              0.0 10*3/uL              

0.0-0.1        









                                        RH IMMUNE GLOBULIN [facility] - 16 05:45         









                          RH IMMUNE GLOBULIN [facility]                             PRSMD [id number]    [id number]

                                        NRG        









                                        Fetal cell screen - 16 05:45         









                    FETAL SCREEN LOT NUMBER              [id number]               [facility]        

 

                    Transfusion band number              [id number]               NRG        

 

                    [id number]              1 300ug              NRG        

 

                    Erythrocytes.fetal/1000 erythrocytes              16               NRG        



 

                    Fetal cell screen              18               NRG        

 

                    Fetal cell screen              NEGATIVE               NEGATIVE        

 

                    Lot number              [id number]               NRG        









                                        Complete blood count (CBC) with automated white blood cell (WBC) differential - 

17 10:35         









                          Blood leukocytes automated count (number/volume)              6.6 10*3/uL         

                                        4.3-11.0        

 

                          Blood erythrocytes automated count (number/volume)              4.68 10*6/uL      

                                        4.35-5.85        

 

                          Venous blood hemoglobin measurement (mass/volume)              13.4 g/dL          

                                        11.5-16.0        

 

                    Blood hematocrit (volume fraction)              39 %                35-52        

 

                    Automated erythrocyte mean corpuscular volume              84 [foz_us]              

80-99        

 

                                        Automated erythrocyte mean corpuscular hemoglobin (mass per erythrocyte)        

                          29 pg                     25-34        

 

                                        Automated erythrocyte mean corpuscular hemoglobin concentration measurement (mass/volume)

                          34 g/dL                   32-36        

 

                    Automated erythrocyte distribution width ratio              13.1 %              10.0-

14.5        

 

                    Automated blood platelet count (count/volume)              340 10*3/uL              

130-400        

 

                          Automated blood platelet mean volume measurement              9.7 [foz_us]        

                                        7.4-10.4        

 

                    Automated blood neutrophils/100 leukocytes              63 %                42-75     

   

 

                    Automated blood lymphocytes/100 leukocytes              30 %                12-44     

   

 

                    Blood monocytes/100 leukocytes              6 %                 0-12        

 

                    Automated blood eosinophils/100 leukocytes              2 %                 0-10       

 

 

                    Automated blood basophils/100 leukocytes              0 %                 0-10        

 

                          Blood neutrophils automated count (number/volume)              4.2 10*3           

                                        1.8-7.8        

 

                          Blood lymphocytes automated count (number/volume)              2.0 10*3           

                                        1.0-4.0        

 

                    Blood monocytes automated count (number/volume)              0.4 10*3              0.0-

1.0        

 

                    Automated eosinophil count              0.1 10*3/uL              0.0-0.3        

 

                    Automated blood basophil count (count/volume)              0.0 10*3/uL              

0.0-0.1        









                                        Blood type T Indirect antibody screen panel - 17 10:35         









                    ABO+Rh group              AN                  NRG        

 

                    Transfusion band number              TNP                 NRG        

 

                    Blood group antibody screen              NEGATIVE               NRG        









                                        Methicillin resistant Staphylococcus aureus (MRSA) screening culture - 17 

10:35         









                          Methicillin resistant Staphylococcus aureus (MRSA) screening culture              

NEG                                     NRG        









                                        Urine beta human chorionic gonadotropin (hCG) measurement - 17 06:18      

   









                          Urine beta human chorionic gonadotropin (hCG) measurement              NEGATIVE   

                                        NEGATIVE        









                                        Blood type T Indirect antibody screen panel - 17 06:25         









                    ABO+Rh group              AN                  NRG        

 

                    Transfusion band number              [id number]               NRG        

 

                    Blood group antibody screen              NEGATIVE               NRG        









                                        Complete blood count (CBC) with automated white blood cell (WBC) differential - 

17 10:45         









                          Blood leukocytes automated count (number/volume)              8.6 10*3/uL         

                                        4.3-11.0        

 

                          Blood erythrocytes automated count (number/volume)              4.23 10*6/uL      

                                        4.35-5.85        

 

                          Venous blood hemoglobin measurement (mass/volume)              12.1 g/dL          

                                        11.5-16.0        

 

                    Blood hematocrit (volume fraction)              37 %                35-52        

 

                    Automated erythrocyte mean corpuscular volume              87 [foz_us]              

80-99        

 

                                        Automated erythrocyte mean corpuscular hemoglobin (mass per erythrocyte)        

                          29 pg                     25-34        

 

                                        Automated erythrocyte mean corpuscular hemoglobin concentration measurement (mass/volume)

                          33 g/dL                   32-36        

 

                    Automated erythrocyte distribution width ratio              13.3 %              10.0-

14.5        

 

                    Automated blood platelet count (count/volume)              270 10*3/uL              

130-400        

 

                          Automated blood platelet mean volume measurement              9.1 [foz_us]        

                                        7.4-10.4        

 

                    Automated blood neutrophils/100 leukocytes              70 %                42-75     

   

 

                    Automated blood lymphocytes/100 leukocytes              21 %                12-44     

   

 

                    Blood monocytes/100 leukocytes              7 %                 0-12        

 

                    Automated blood eosinophils/100 leukocytes              3 %                 0-10       

 

 

                    Automated blood basophils/100 leukocytes              0 %                 0-10        

 

                          Blood neutrophils automated count (number/volume)              6.0 10*3           

                                        1.8-7.8        

 

                          Blood lymphocytes automated count (number/volume)              1.8 10*3           

                                        1.0-4.0        

 

                    Blood monocytes automated count (number/volume)              0.6 10*3              0.0-

1.0        

 

                    Automated eosinophil count              0.2 10*3/uL              0.0-0.3        

 

                    Automated blood basophil count (count/volume)              0.0 10*3/uL              

0.0-0.1        









                                        Comprehensive metabolic panel - 17 10:45         









                          Serum or plasma sodium measurement (moles/volume)              138 mmol/L         

                                        135-145        

 

                          Serum or plasma potassium measurement (moles/volume)              4.0 mmol/L      

                                        3.6-5.0        

 

                          Serum or plasma chloride measurement (moles/volume)              106 mmol/L       

                                                

 

                    Carbon dioxide              24 mmol/L              21-32        

 

                          Serum or plasma anion gap determination (moles/volume)              8 mmol/L      

                                        5-14        

 

                          Serum or plasma urea nitrogen measurement (mass/volume)              5 mg/dL      

                                        7-18        

 

                          Serum or plasma creatinine measurement (mass/volume)              0.75 mg/dL      

                                        0.60-1.30        

 

                    Serum or plasma urea nitrogen/creatinine mass ratio              7                   NRG

        

 

                                        Serum or plasma creatinine measurement with calculation of estimated glomerular 

filtration rate              >                         NRG        

 

                          Serum or plasma glucose measurement (mass/volume)              84 mg/dL           

                                                

 

                          Serum or plasma calcium measurement (mass/volume)              8.4 mg/dL          

                                        8.5-10.1        

 

                          Serum or plasma total bilirubin measurement (mass/volume)              0.4 mg/dL  

                                        0.1-1.0        

 

                                        Serum or plasma alkaline phosphatase measurement (enzymatic activity/volume)    

                          67 U/L                            

 

                                        Serum or plasma aspartate aminotransferase measurement (enzymatic activity/volume)

                          44 U/L                    5-34        

 

                                        Serum or plasma alanine aminotransferase measurement (enzymatic activity/volume)

                          76 U/L                    0-55        

 

                          Serum or plasma protein measurement (mass/volume)              6.4 g/dL           

                                        6.4-8.2        

 

                          Serum or plasma albumin measurement (mass/volume)              3.5 g/dL           

                                        3.2-4.5        









                                        Complete blood count (CBC) with automated white blood cell (WBC) differential - 

17 05:08         









                          Blood leukocytes automated count (number/volume)              6.6 10*3/uL         

                                        4.3-11.0        

 

                          Blood erythrocytes automated count (number/volume)              3.99 10*6/uL      

                                        4.35-5.85        

 

                          Venous blood hemoglobin measurement (mass/volume)              11.5 g/dL          

                                        11.5-16.0        

 

                    Blood hematocrit (volume fraction)              35 %                35-52        

 

                    Automated erythrocyte mean corpuscular volume              87 [foz_us]              

80-99        

 

                                        Automated erythrocyte mean corpuscular hemoglobin (mass per erythrocyte)        

                          29 pg                     25-34        

 

                                        Automated erythrocyte mean corpuscular hemoglobin concentration measurement (mass/volume)

                          33 g/dL                   32-36        

 

                    Automated erythrocyte distribution width ratio              13.1 %              10.0-

14.5        

 

                    Automated blood platelet count (count/volume)              279 10*3/uL              

130-400        

 

                          Automated blood platelet mean volume measurement              9.8 [foz_us]        

                                        7.4-10.4        

 

                    Automated blood neutrophils/100 leukocytes              62 %                42-75     

   

 

                    Automated blood lymphocytes/100 leukocytes              29 %                12-44     

   

 

                    Blood monocytes/100 leukocytes              6 %                 0-12        

 

                    Automated blood eosinophils/100 leukocytes              3 %                 0-10       

 

 

                    Automated blood basophils/100 leukocytes              0 %                 0-10        

 

                          Blood neutrophils automated count (number/volume)              4.1 10*3           

                                        1.8-7.8        

 

                          Blood lymphocytes automated count (number/volume)              1.9 10*3           

                                        1.0-4.0        

 

                    Blood monocytes automated count (number/volume)              0.4 10*3              0.0-

1.0        

 

                    Automated eosinophil count              0.2 10*3/uL              0.0-0.3        

 

                    Automated blood basophil count (count/volume)              0.0 10*3/uL              

0.0-0.1        









                                        Comprehensive metabolic panel - 17 05:08         









                          Serum or plasma sodium measurement (moles/volume)              138 mmol/L         

                                        135-145        

 

                          Serum or plasma potassium measurement (moles/volume)              4.3 mmol/L      

                                        3.6-5.0        

 

                          Serum or plasma chloride measurement (moles/volume)              105 mmol/L       

                                                

 

                    Carbon dioxide              26 mmol/L              21-32        

 

                          Serum or plasma anion gap determination (moles/volume)              7 mmol/L      

                                        5-14        

 

                          Serum or plasma urea nitrogen measurement (mass/volume)              5 mg/dL      

                                        7-18        

 

                          Serum or plasma creatinine measurement (mass/volume)              0.70 mg/dL      

                                        0.60-1.30        

 

                    Serum or plasma urea nitrogen/creatinine mass ratio              7                   NRG

        

 

                                        Serum or plasma creatinine measurement with calculation of estimated glomerular 

filtration rate              >                         NRG        

 

                          Serum or plasma glucose measurement (mass/volume)              83 mg/dL           

                                                

 

                          Serum or plasma calcium measurement (mass/volume)              8.6 mg/dL          

                                        8.5-10.1        

 

                          Serum or plasma total bilirubin measurement (mass/volume)              0.2 mg/dL  

                                        0.1-1.0        

 

                                        Serum or plasma alkaline phosphatase measurement (enzymatic activity/volume)    

                          77 U/L                            

 

                                        Serum or plasma aspartate aminotransferase measurement (enzymatic activity/volume)

                          45 U/L                    5-34        

 

                                        Serum or plasma alanine aminotransferase measurement (enzymatic activity/volume)

                          61 U/L                    0-55        

 

                          Serum or plasma protein measurement (mass/volume)              6.2 g/dL           

                                        6.4-8.2        

 

                          Serum or plasma albumin measurement (mass/volume)              3.2 g/dL           

                                        3.2-4.5        









                                        Acute hepatitis panel - 17 05:08         









                                        Confirmatory quantitative serum or plasma hepatitis B virus surface antigen measurement

                          Non-Reactive               Non-Reactive        

 

                    Hepatitis A virus IgM antibody assay              Non-Reactive               Non-Reactive

        

 

                    Hepatitis B virus core IgM antibody assay              Non-Reactive               Non-

Reactive        

 

                    Serum hepatitis C virus antibody detection              Non-Reactive               Non-

Reactive        









                                        Thyroid Stimulating Hormone - 18 13:16         









                    TSH                 5.20 mIU/mL              0.32-5.00        









                                        Complete urinalysis with reflex to culture - 18 13:48         









                    Urine color determination              YELLOW               NRG        

 

                    Urine clarity determination              SLIGHTLY CLOUDY               NRG        

 

                    Urine pH measurement by test strip              8                   5-9        

 

                    Specific gravity of urine by test strip              1.015               1.016-1.022

        

 

                          Urine protein assay by test strip, semi-quantitative              NEGATIVE        

                                        NEGATIVE        

 

                    Urine glucose detection by automated test strip              NEGATIVE               

NEGATIVE        

 

                          Erythrocytes detection in urine sediment by light microscopy              NEGATIVE

                                        NEGATIVE        

 

                    Urine ketones detection by automated test strip              NEGATIVE               

NEGATIVE        

 

                    Urine nitrite detection by test strip              NEGATIVE               NEGATIVE  

      

 

                    Urine total bilirubin detection by test strip              NEGATIVE               NEGATIVE

        

 

                          Urine urobilinogen measurement by automated test strip (mass/volume)              

NORMAL                                  NORMAL        

 

                    Urine leukocyte esterase detection by dipstick              NEGATIVE               NEGATIVE

        

 

                                        Automated urine sediment erythrocyte count by microscopy (number/high power field)

                          NONE                      NRG        

 

                                        Automated urine sediment leukocyte count by microscopy (number/high power field)

                          NONE                      NRG        

 

                          Bacteria detection in urine sediment by light microscopy              NEGATIVE    

                                        NRG        

 

                                        Squamous epithelial cells detection in urine sediment by light microscopy       

                          5-10                      NRG        

 

                          Crystals detection in urine sediment by light microscopy              PRESENT     

                                        NRG        

 

                          Casts detection in urine sediment by light microscopy              NONE           

                                        NRG        

 

                          Mucus detection in urine sediment by light microscopy              NEGATIVE       

                                        NRG        

 

                    Complete urinalysis with reflex to culture              NO                  NRG        



 

                          Amorphous sediment detection in urine sediment by light microscopy              MOD

  PHOSPHATE                         NRG        









                                        Urine beta human chorionic gonadotropin (hCG) measurement - 18 13:48      

   









                          Urine beta human chorionic gonadotropin (hCG) measurement              NEGATIVE   

                                        NEGATIVE        









                                        Complete blood count (CBC) with automated white blood cell (WBC) differential - 

18 14:25         









                          Blood leukocytes automated count (number/volume)              6.5 10*3/uL         

                                        4.3-11.0        

 

                          Blood erythrocytes automated count (number/volume)              4.31 10*6/uL      

                                        4.35-5.85        

 

                          Venous blood hemoglobin measurement (mass/volume)              13.1 g/dL          

                                        11.5-16.0        

 

                    Blood hematocrit (volume fraction)              38 %                35-52        

 

                    Automated erythrocyte mean corpuscular volume              89 [foz_us]              

80-99        

 

                                        Automated erythrocyte mean corpuscular hemoglobin (mass per erythrocyte)        

                          30 pg                     25-34        

 

                                        Automated erythrocyte mean corpuscular hemoglobin concentration measurement (mass/volume)

                          34 g/dL                   32-36        

 

                    Automated erythrocyte distribution width ratio              11.9 %              10.0-

14.5        

 

                    Automated blood platelet count (count/volume)              300 10*3/uL              

130-400        

 

                          Automated blood platelet mean volume measurement              9.1 [foz_us]        

                                        7.4-10.4        

 

                    Automated blood neutrophils/100 leukocytes              54 %                42-75     

   

 

                    Automated blood lymphocytes/100 leukocytes              37 %                12-44     

   

 

                    Blood monocytes/100 leukocytes              6 %                 0-12        

 

                    Automated blood eosinophils/100 leukocytes              3 %                 0-10       

 

 

                    Automated blood basophils/100 leukocytes              0 %                 0-10        

 

                          Blood neutrophils automated count (number/volume)              3.5 10*3           

                                        1.8-7.8        

 

                          Blood lymphocytes automated count (number/volume)              2.4 10*3           

                                        1.0-4.0        

 

                    Blood monocytes automated count (number/volume)              0.4 10*3              0.0-

1.0        

 

                    Automated eosinophil count              0.2 10*3/uL              0.0-0.3        

 

                    Automated blood basophil count (count/volume)              0.0 10*3/uL              

0.0-0.1        









                                        Comprehensive metabolic panel - 18 14:25         









                          Serum or plasma sodium measurement (moles/volume)              138 mmol/L         

                                        135-145        

 

                          Serum or plasma potassium measurement (moles/volume)              3.8 mmol/L      

                                        3.6-5.0        

 

                          Serum or plasma chloride measurement (moles/volume)              112 mmol/L       

                                                

 

                    Carbon dioxide              22 mmol/L              21-32        

 

                          Serum or plasma anion gap determination (moles/volume)              4 mmol/L      

                                        5-14        

 

                          Serum or plasma urea nitrogen measurement (mass/volume)              11 mg/dL     

                                        7-18        

 

                          Serum or plasma creatinine measurement (mass/volume)              0.73 mg/dL      

                                        0.60-1.30        

 

                    Serum or plasma urea nitrogen/creatinine mass ratio              15                  NRG

        

 

                                        Serum or plasma creatinine measurement with calculation of estimated glomerular 

filtration rate              >                         NRG        

 

                          Serum or plasma glucose measurement (mass/volume)              99 mg/dL           

                                                

 

                          Serum or plasma calcium measurement (mass/volume)              8.9 mg/dL          

                                        8.5-10.1        

 

                          Serum or plasma total bilirubin measurement (mass/volume)              0.5 mg/dL  

                                        0.1-1.0        

 

                                        Serum or plasma alkaline phosphatase measurement (enzymatic activity/volume)    

                          47 U/L                            

 

                                        Serum or plasma aspartate aminotransferase measurement (enzymatic activity/volume)

                          17 U/L                    5-34        

 

                                        Serum or plasma alanine aminotransferase measurement (enzymatic activity/volume)

                          13 U/L                    0-55        

 

                          Serum or plasma protein measurement (mass/volume)              7.3 g/dL           

                                        6.4-8.2        

 

                          Serum or plasma albumin measurement (mass/volume)              3.9 g/dL           

                                        3.2-4.5        









                                        Comprehensive Metabolic Panel - 18 19:25         









                    Albumin              3.8 g/dL              3.6-5.1        

 

                    ALP                 63 U/L                      

 

                    ALT                 23 U/L              6-45        

 

                    Anion Gap              13                  6-14        

 

                    AST                 25 U/L              2-40        

 

                    BUN                 13 mg/dL              5-25        

 

                    Calcium              8.8 mg/dL              8.3-10.4        

 

                    Chloride              109 mmol/L                      

 

                    CO2                 21 mEq/L              22-33        

 

                    Creat               0.77 mg/dL              0.50-1.50        

 

                    eGFR                89 mL/min/1.73m2              >59        

 

                    Globulin              3.3 g/dL              2.3-3.5        

 

                    Glucose              90 mg/dL                      

 

                    Osmo                287                 280-295        

 

                    Potassium              3.8 mmol/L              3.5-5.3        

 

                    Sodium              139 mmol/L              134-148        

 

                    TBil                0.3 mg/dL              0.2-1.2        

 

                    TP                  7.1 g/dL              6.0-8.3        









                                        Lipase - 18 19:25         









                    Lipase              47 U/L              7-59        









                                        Urinalysis - 18 19:25         









                    Icotest              N/A                 Negative        

 

                    Urine Volume              Urine Volume Sufficient (10mL)                        

 

                    Urine Yeast              No Yeast present                        

 

                    Urine-Appearance              Slightly Cloudy               Clear        

 

                    Urine-Bacteria              2+                           

 

                    Urine-Bilirubin              Negative               Negative        

 

                    Urine-Blood              Trace-intact               Negative        

 

                    Urine-Color              Yellow               Colorless-Lt. Yellow        

 

                    Urine-Epithelial Cells              TNTC                         

 

                    Urine-Glucose              Negative               Negative        

 

                    Urine-Ketones              Negative               Negative        

 

                    Urine-Leukocytes              1+                  Negative        

 

                    Urine-Mucus              3+                           

 

                    Urine-Nitrite              Negative               Negative        

 

                    Urine-Other              Trichomonas                        

 

                    Urine-pH              5.5                 5-8.5        

 

                    Urine-Protein              1+                  Negative        

 

                    Urine-RBC              2-5/HPF                        

 

                    Urine-Specific Gravity              >=1.030               1.000-1.030        

 

                    Urine-WBC              5-10/HPF                        

 

                    Urobilinogen              0.2                 0.2-1.0        









                                        Urine Culture - 18 19:25         









                    PRELIM CULTURE RESULTS              No Growth 24 hours                        

 

                          FINAL CULTURE RESULTS              <10,000 Mixed Aysha Probable Skin Contaminant No

 Further Workup done                             

 

                    MEDIA PLATED              Setup at 21:22 on 2018                        

 

                    CULTURE SOURCE              VOID                         









                                        Protime  - 18 07:27         









                    INR                 1.0                 1.0-4.0        

 

                    Protime              11.8 Sec              9.9-12.8        









                                        Surgical Pathology - 18 08:31         









                    Surg Path              Sent to [facility]                        









                                        Complete blood count (CBC) with automated white blood cell (WBC) differential - 

19 19:48         









                          Blood leukocytes automated count (number/volume)              12.4 10*3/uL        

                                        4.3-11.0        

 

                          Blood erythrocytes automated count (number/volume)              4.61 10*6/uL      

                                        4.35-5.85        

 

                          Venous blood hemoglobin measurement (mass/volume)              13.6 g/dL          

                                        11.5-16.0        

 

                    Blood hematocrit (volume fraction)              39 %                35-52        

 

                    Automated erythrocyte mean corpuscular volume              85 [foz_us]              

80-99        

 

                                        Automated erythrocyte mean corpuscular hemoglobin (mass per erythrocyte)        

                          30 pg                     25-34        

 

                                        Automated erythrocyte mean corpuscular hemoglobin concentration measurement (mass/volume)

                          35 g/dL                   32-36        

 

                    Automated erythrocyte distribution width ratio              12.3 %              10.0-

14.5        

 

                    Automated blood platelet count (count/volume)              335 10*3/uL              

130-400        

 

                          Automated blood platelet mean volume measurement              9.2 [foz_us]        

                                        7.4-10.4        

 

                    Automated blood neutrophils/100 leukocytes              65 %                42-75     

   

 

                    Automated blood lymphocytes/100 leukocytes              25 %                12-44     

   

 

                    Blood monocytes/100 leukocytes              9 %                 0-12        

 

                    Automated blood eosinophils/100 leukocytes              1 %                 0-10       

 

 

                    Automated blood basophils/100 leukocytes              0 %                 0-10        

 

                          Blood neutrophils automated count (number/volume)              8.1 10*3           

                                        1.8-7.8        

 

                          Blood lymphocytes automated count (number/volume)              3.1 10*3           

                                        1.0-4.0        

 

                    Blood monocytes automated count (number/volume)              1.1 10*3              0.0-

1.0        

 

                    Automated eosinophil count              0.1 10*3/uL              0.0-0.3        

 

                    Automated blood basophil count (count/volume)              0.0 10*3/uL              

0.0-0.1        









                                        Comprehensive metabolic panel - 19 19:48         









                          Serum or plasma sodium measurement (moles/volume)              139 mmol/L         

                                        135-145        

 

                          Serum or plasma potassium measurement (moles/volume)              3.4 mmol/L      

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 VILLA DO              [facility]                                

 

                    [id number]              2014 15:40:00              2014 18:30:00      

                DIS              Inpatient              CARLINE VILLA DO              [facility]                        HBP        

 

                    [id number]              2013 01:11:00              2013 03:23:00      

                DIS              Emergency              JACKELIN BYRD DO              [facility]              ER                                 

 

                    [id number]              10/14/2013 23:50:00              10/16/2013 13:40:00      

           DIS              Inpatient                                                           

         

 

                    [id number]              2013 19:33:00              2013 23:59:59      

             [facility]              Outpatient                                                        

                                                 

 

                [id number]              2016 16:20:00                                          

             Document Registration                                                                   

 

 

                [id number]              2016 11:18:00                                          

             Document Registration                                                                   

 

 

                [id number]              2013 20:51:00                                          

             Document Registration                                                                   

 

 

                [id number]              2012 18:40:00                                          

             Document Registration                                                                   

 

 

                [id number]              2012 17:48:00                                          

             Document Registration                                                                   

 

 

                [id number]              2011 11:04:00                                          

             Document Registration                                                                   

 

 

                [id number]              2011 19:14:00                                          

             Document Registration                                                                   

 

 

                [id number]              2018 06:07:00              2018 09:17:00              

DIS                 Outpatient              Vance Arnold                         

                                                             

 

                [id number]              2018 12:55:00              2018 23:59:00              

DIS              Outpatient              EDELMIRA CHENG                                  

                                                 

 

                [id number]              2018 19:00:00              2018 23:13:00              

DIS              Outpatient              CHARO SEAY                                     

                                                 

 

                [id number]              2018 00:00:00              2018 23:59:00              

DIS                 Outpatient              Vance Arnold                         

                                                             

 

                [id number]              2018 13:15:00              2018 23:59:00              

DIS              Outpatient              EDELMIRA CHENG                                  

                                                 

 

             [id number]              2018 08:22:34                                            Document

 Registration                                                                       

 

             [id number]              01/10/2018 06:33:04                                            Document

 Registration

## 2023-01-24 ENCOUNTER — HOSPITAL ENCOUNTER (EMERGENCY)
Dept: HOSPITAL 75 - ER | Age: 33
Discharge: HOME | End: 2023-01-24
Payer: MEDICAID

## 2023-01-24 VITALS — HEIGHT: 62.99 IN | WEIGHT: 188.94 LBS | BODY MASS INDEX: 33.48 KG/M2

## 2023-01-24 VITALS — DIASTOLIC BLOOD PRESSURE: 82 MMHG | SYSTOLIC BLOOD PRESSURE: 117 MMHG
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
## 2023-01-24 DIAGNOSIS — Z28.310: ICD-10-CM
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
## 2023-01-24 DIAGNOSIS — S82.64XA: Primary | ICD-10-CM

## 2023-01-24 DIAGNOSIS — S51.812A: ICD-10-CM

## 2023-01-24 PROCEDURE — 73610 X-RAY EXAM OF ANKLE: CPT

## 2023-01-24 PROCEDURE — 73090 X-RAY EXAM OF FOREARM: CPT

## 2023-01-24 NOTE — DIAGNOSTIC IMAGING REPORT
INDICATION: 

Injury with laceration.



FINDINGS:

A horizontal subtle lucency at the distal fibula with overlying

soft tissue swelling is presumed a nondisplaced fracture as

opposed to the remnant of the prior physis. No widening of the

mortise. The medial and posterior malleoli, plafond, and talar

dome are intact. 



IMPRESSION: 

Lateral swelling with a subtle nondisplaced horizontal fracture

through the distal fibula. No other injury or foreign body.



Dictated by: 



  Dictated on workstation # [id number]

## 2023-01-24 NOTE — DIAGNOSTIC IMAGING REPORT
INDICATION: Laceration.



FINDINGS: There is a soft tissue injury to the anteromedial

aspect of the forearm present. No retained opaque foreign body.

No bony injury.



IMPRESSION: Two-view left forearm shows soft tissue injury but no

retained foreign body or fracture.



Dictated by: 



  Dictated on workstation # [id number]

## 2023-01-24 NOTE — ED UPPER EXTREMITY
General


Stated Complaint:  FALL IN GARAGE | LT ARM INJ


Source:  patient


Exam Limitations:  no limitations





History of Present Illness


Date Seen by Provider:  2023


Time Seen by Provider:  14:55


Initial Comments


Patient is a 32-year-old female who presents to the hospital for evaluation of a

left arm laceration that occurred a few hours prior to arrival when she fell on 

some metal in her garage.  She states there is some renovating occurring in the 

house and there was some construction debris in the garage.  Patient states she 

slipped causing the fall.  She denies any loss of consciousness or head injury. 

She states the wound was bleeding but was controlled with direct pressure.  

Patient is initially unsure of the date of her last tetanus immunization but 

shortly after I left the room she told nursing that she had a tetanus 

immunization 2 and half years ago.  She also states she has some right ankle 

pain where she twisted it falling.





Allergies and Home Medications


Allergies


Coded Allergies:  


     No Known Drug Allergies (Unverified , 17)





Patient Home Medication List


Home Medication List Reviewed:  Yes


Oxycodone HCl/Acetaminophen (Oxycodone-Acetaminophen 5-325) 5 Mg-325 Mg Tablet, 

1 EACH PO Q4H PRN for PAIN-SEVERE


   Prescribed by: Anita Gilliam on 23 [id number]





Review of Systems


Constitutional:  no symptoms reported


EENTM:  no symptoms reported


Respiratory:  no symptoms reported


Cardiovascular:  no symptoms reported


Gastrointestinal:  no symptoms reported


Genitourinary:  no symptoms reported


Musculoskeletal:  see HPI


Skin:  see HPI





Past Medical-Social-Family Hx


Immunizations Up To Date


Tetanus Booster (TDap):  More than 5yrs


PED Vaccines UTD:  Yes





Seasonal Allergies


Seasonal Allergies:  No





Past Medical History


Surgeries:  Yes (CS X4)


 Section, Hysterectomy


Respiratory:  Yes (AS A CHILD)


Asthma


Cardiac:  No


Neurological:  Yes ("SEIZURE ACTIVITY"-PREGNANCY RELATED)


Headaches /Migraines, TIA


Reproductive Disorders:  Yes (CPP, DYSMENORRHIA)


Female Reproductive Disorders:  Menstrual Problems, Endometriosis, Ovarian Cyst


Sexually Transmitted Disease:  No


HIV/AIDS:  No


Genitourinary:  Yes


Kidney Infection, Bladder Infection


Gastrointestinal:  No


Musculoskeletal:  No


Endocrine:  No


HEENT:  No


Loss of Vision:  Bilateral


Hearing Impairment:  Denies


Cancer:  No


Psychosocial:  Yes


Anxiety, Depression


Integumentary:  No


Recent Skin Changes


Blood Disorders:  No


Adverse Reaction/Blood Tranf:  No





Family Medical History





Diabetes mellitus (grandmother)


Family history: Hypertension


  19 MOTHER


Hereditary disease


  daughter (Ruy Simons's syndrome)


Stroke


  19 FATHER


No Pertinent Family Hx





Physical Exam


Vital Signs





Vital Signs - First Documented








 23





 14:50


 


Temp 35.2


 


Pulse 117


 


Resp 18


 


B/P (MAP) 113/66 (82)


 


Pulse Ox 100


 


O2 Delivery Room Air





Capillary Refill :


Height, Weight, BMI


Height: 5'5.00"


Weight: 177lbs. 6.4oz. [id number]; 29.5 BMI


Method:Estimated


General Appearance:  WD/WN, no apparent distress


HEENT:  PERRL/EOMI, normal ENT inspection, TMs normal, pharynx normal


Neck:  non-tender, full range of motion


Cardiovascular:  regular rate, rhythm


Respiratory:  lungs clear, normal breath sounds


Gastrointestinal:  normal bowel sounds, non tender, soft


Elbow/Forearm:  soft tissue tenderness


Neurologic/Tendon:  normal sensation, normal motor functions, normal tendon 

functions, no evidence tendon injury


Neurologic/Psychiatric:  no motor/sensory deficits, alert, normal mood/affect, 

oriented x 3


Mild swelling noted to the lateral right ankle with bony tenderness to palpation





Procedures/Interventions





   Wound Location:  Upper Extremities


Other Wound Location


Anteromedial left forearm


   Wound's Depth, Shape:  linear, sub Q


   Wound Explored:  no foreign body removed


   Irrigated w/ Saline (ccs):  500


   Betadine Prep?:  No


   Anesthesia:  1% Lidocaine


   Volume Anesthetic (ccs):  11


   Suture:  Ethlion


   Suture Size:  3-0, 4-0


   Number of Sutures:  18


   Layer Closure?:  1


   Number Deep Layer Sutures:  0


   Sterile Dressing Applied?:  Yes


Progress


Laceration extended to the skin and subcutaneous fat; no muscle or tendon 

involvement appreciated; patient retains flexor function of her digits and wrist

of the left upper extremity; radial and ulnar pulses are strong and regular in 

the left upper extremity





Progress/Results/Core Measures


Results/Orders


My Orders





Medications Given in ED





Vital Signs/I&O








Progress


Progress Note :  


Progress Note


Patient is nontoxic and well-hydrated on exam.  Vital signs are reassuring.  

Patient does have a deep laceration noted to the anterior aspect of the left 

forearm.  There appears to be extension through the skin and subcutaneous 

tissue.  There appears to be no violation of the muscle bodies.  No visible 

injury to the tendons or any vascular structures.  Patient is able to flex her 

fingers and wrist on the left upper extremity.  Sensation is intact in the 

digits of the left hand.  Left radial and ulnar pulses are strong, brisk, and 

symmetrical.





Radiographs of the left forearm and right ankle were obtained.





No acute osseous abnormality noted on the left forearm radiographs.  Right ankle

films notable for nondisplaced fibular fracture.  Laceration was repaired as n

oted separately.  Patient tolerated well.  Patient was given a walking boot and 

crutches and told to follow-up with orthopedics for further evaluation and 

treatment.





Departure


Impression





   Primary Impression:  


   Closed right ankle fracture


   Qualified Codes:  S82.891A - Other fracture of right lower leg, initial 

   encounter for closed fracture


   Additional Impression:  


   Laceration of left forearm


   Qualified Codes:  S51.812A - Laceration without foreign body of left forearm,

   initial encounter


Disposition:   HOME, SELF-CARE


Condition:  Stable





Departure-Patient Inst.


Decision time for Depature:  16:40


Referrals:  


NO,LOCAL PHYSICIAN (PCP/Family)


Primary Care Physician


Patient Instructions:  Ankle Fracture ED, Laceration Repair With Stitches ED





Add. Discharge Instructions:  


You will need to have the sutures removed from your arm in 10-14 days. You will 

need to follow-up with orthopedics for further evaluation of your ankle 

fracture.


Scripts


Oxycodone HCl/Acetaminophen (Oxycodone-Acetaminophen 5-325) 5 Mg-325 Mg Tablet


1 EACH PO Q4H PRN for PAIN-SEVERE MDD 6 for 3 Days, #18 TAB 0 Refills


   Prov: ANITA GILLIAM         23











ANITA GILLIAM             2023 15:00